# Patient Record
Sex: MALE | Race: WHITE | Employment: OTHER | URBAN - METROPOLITAN AREA
[De-identification: names, ages, dates, MRNs, and addresses within clinical notes are randomized per-mention and may not be internally consistent; named-entity substitution may affect disease eponyms.]

---

## 2017-07-23 PROBLEM — N18.30 CKD (CHRONIC KIDNEY DISEASE) STAGE 3, GFR 30-59 ML/MIN (HCC): Status: ACTIVE | Noted: 2017-07-23

## 2017-07-23 PROBLEM — E78.5 DYSLIPIDEMIA: Status: ACTIVE | Noted: 2017-07-23

## 2017-07-23 PROBLEM — R73.03 PREDIABETES: Status: ACTIVE | Noted: 2017-07-23

## 2017-07-23 PROBLEM — N20.0 RECURRENT KIDNEY STONES: Status: ACTIVE | Noted: 2017-07-23

## 2017-07-23 PROBLEM — I10 HYPERTENSION: Status: ACTIVE | Noted: 2017-07-23

## 2017-07-23 PROBLEM — M10.9 GOUT: Status: ACTIVE | Noted: 2017-07-23

## 2017-07-25 RX ORDER — COLCHICINE 0.6 MG/1
0.6 TABLET ORAL DAILY
COMMUNITY
End: 2018-05-18

## 2017-07-25 RX ORDER — LISINOPRIL 5 MG/1
5 TABLET ORAL
COMMUNITY
End: 2018-05-20

## 2017-07-25 RX ORDER — FENOFIBRATE 160 MG/1
160 TABLET ORAL DAILY
COMMUNITY
End: 2017-12-10 | Stop reason: SDUPTHER

## 2017-07-25 RX ORDER — ALLOPURINOL 100 MG/1
TABLET ORAL DAILY
COMMUNITY
End: 2018-05-18 | Stop reason: DRUGHIGH

## 2017-07-26 ENCOUNTER — OFFICE VISIT (OUTPATIENT)
Dept: INTERNAL MEDICINE CLINIC | Age: 67
End: 2017-07-26

## 2017-07-26 VITALS
HEIGHT: 66 IN | DIASTOLIC BLOOD PRESSURE: 76 MMHG | HEART RATE: 64 BPM | BODY MASS INDEX: 30.25 KG/M2 | WEIGHT: 188.2 LBS | SYSTOLIC BLOOD PRESSURE: 120 MMHG

## 2017-07-26 DIAGNOSIS — N18.30 CKD (CHRONIC KIDNEY DISEASE) STAGE 3, GFR 30-59 ML/MIN (HCC): ICD-10-CM

## 2017-07-26 DIAGNOSIS — R73.03 PRE-DIABETES: ICD-10-CM

## 2017-07-26 DIAGNOSIS — I10 ESSENTIAL HYPERTENSION: Primary | ICD-10-CM

## 2017-07-26 DIAGNOSIS — E78.5 DYSLIPIDEMIA: ICD-10-CM

## 2017-07-26 NOTE — PROGRESS NOTES
This note will not be viewable in [address] Hilary Ibarra. is a 69 y.o. male and presents with Hypertension (6 mo fu) and Cholesterol Problem (6 mo fu)  . Subjective: The patient presents to the office today in 6 month follow-up of a number of medical problems. Patient has hypertension for which she is on lisinopril and atenolol he tolerates this without fatigue or palpitations, muscle cramping cough, swelling cough rash. He has had no focal neurological deficits. He denies dizzy spells. He remains physically active. Dyslipidemia is currently managed on fenofibrate which he tolerates without upset stomach. He has no history of ASCVD and denies exertional chest pain or claudication. The patient has chronic kidney disease stage III is followed by Dr. Herson Esqueda and this has been checked within the last 3 weeks by Dr. Herson Esqueda and it is stable without changes in his creatinine clearance. The patient has episodic gout and has been started on allopurinol. Uric acid level was not to goal recently and his allopurinol was adjusted. He does have Colcrys available for episodic flares but has not had one in over one year. The patient has recurrent kidney stones. He has not had an attack in over one year. It is hoped that by lowering his uric acid level that this will be an infrequent event. Past Medical History:   Diagnosis Date    Cancer [facility])     prostate    Finding of rib structure     right lower rib cartilage weak    Hypertension     Other ill-defined conditions     gout    Unspecified adverse effect of anesthesia     lays on back,chokes on drainage    Unspecified sleep apnea     septum surgery to fix, still an issue     Past Surgical History:   Procedure Laterality Date    HX KNEE ARTHROSCOPY      HX OTHER SURGICAL      kidney stones     No Known Allergies  Current Outpatient Prescriptions   Medication Sig Dispense Refill    allopurinol (ZYLOPRIM) 100 mg tablet Take  by mouth daily.  fenofibrate (LOFIBRA) 160 mg tablet Take 160 mg by mouth daily.  colchicine (COLCRYS) 0.6 mg tablet Take 0.6 mg by mouth daily.  lisinopril (PRINIVIL, ZESTRIL) 5 mg tablet Take  by mouth daily.  aspirin (ASPIRIN) 325 mg tablet Take 325 mg by mouth daily.  atenolol (TENORMIN) 50 mg tablet Take 25 mg by mouth daily. Social History     Social History    Marital status:      Spouse name: N/A    Number of children: N/A    Years of education: N/A     Social History Main Topics    Smoking status: Never Smoker    Smokeless tobacco: Never Used    Alcohol use No    Drug use: No    Sexual activity: Not Asked     Other Topics Concern    None     Social History Narrative     Family History   Problem Relation Age of Onset    Diabetes Mother     Hypertension Father        Health Maintenance   Topic Date Due    Hepatitis C Screening  1950    DTaP/Tdap/Td series (1 - Tdap) 01/08/1969    FOBT Q 1 YEAR AGE 50-75  01/08/1998    ZOSTER VACCINE AGE 60>  11/08/2007    GLAUCOMA SCREENING Q2Y  01/08/2013    Pneumococcal 65+ High/Highest Risk (1 of 2 - PCV13) 01/08/2013    MEDICARE YEARLY EXAM  01/08/2013    INFLUENZA AGE 9 TO ADULT  08/01/2017        Review of Systems  Constitutional: negative for fevers, chills, anorexia and weight loss  Eyes:   negative for visual disturbance and irritation  ENT:   negative for tinnitus,sore throat,nasal congestion,ear pains. hoarseness  Respiratory:  negative for cough, hemoptysis, dyspnea,wheezing  CV:   negative for chest pain, palpitations, lower extremity edema  GI:   negative for nausea, vomiting, diarrhea, abdominal pain,melena  Endo:               negative for polyuria,polydipsia,polyphagia,heat intolerance  Genitourinary: negative for frequency, dysuria and hematuria  Integumentary: negative for rash and pruritus  Hematologic:  negative for easy bruising and gum/nose bleeding  Musculoskel: negative for myalgias, arthralgias, back pain, muscle weakness, joint pain  Neurological:  negative for headaches, dizziness, vertigo, memory problems and gait   Behavl/Psych: negative for feelings of anxiety, depression, mood changes  ROS otherwise negative      Objective:  Visit Vitals    /76    Pulse 64    Ht 5' 6\" (1.676 m)    Wt 188 lb 3.2 oz (85.4 kg)    BMI 30.38 kg/m2     Body mass index is 30.38 kg/(m^2). Physical Exam:   General appearance - alert, well appearing, and in no distress  Mental status - alert, oriented to person, place, and time  EYE-YOLIS, EOMI, fundi normal, corneas normal, no foreign bodies  ENT-ENT exam normal, no neck nodes or sinus tenderness  Nose - normal and patent, no erythema, discharge or polyps  Mouth - mucous membranes moist, pharynx normal without lesions  Neck - supple, no significant adenopathy   Chest - clear to auscultation, no wheezes, rales or rhonchi, symmetric air entry   Heart - normal rate, regular rhythm, normal S1, S2, no murmurs, rubs, clicks or gallops   Abdomen - soft, nontender, nondistended, no masses or organomegaly  Lymph- no adenopathy palpable  Ext-peripheral pulses normal, no pedal edema, no clubbing or cyanosis  Skin-Warm and dry. no hyperpigmentation, vitiligo, or suspicious lesions  Neuro -alert, oriented, normal speech, no focal findings or movement disorder noted      Assessment/Plan:  Diagnoses and all orders for this visit:    1. Essential hypertension    2. Dyslipidemia    3. Pre-diabetes    4. CKD (chronic kidney disease) stage 3, GFR 30-59 ml/min        Other instructions:   Medications are reviewed and reconciled and no changes are made. No added salt prudent diet and exercise encouraged    Await labs from Dr. Rhoda Cutler office. Follow-up Disposition:  Return in about 6 months (around 1/26/2018). I have reviewed with the patient details of the assessment and plan and all questions were answered. Relevent patient education was performed. The most recent lab findings were reviewed with the patient. An After Visit Summary was printed and given to the patient.     Verner Auerbach, MD

## 2017-07-26 NOTE — PROGRESS NOTES
Heather Velasquez. is a 69 y.o. male presenting for Hypertension (6 mo fu) and Cholesterol Problem (6 mo fu)  . 1. Have you been to the ER, urgent care clinic since your last visit? Hospitalized since your last visit? No    2. Have you seen or consulted any other health care providers outside of the [address] [place] [address] since your last visit? Include any pap smears or colon screening. Yes Dr Tristian Hanna- kidneys    Fall Risk Assessment, last 12 mths 7/26/2017   Able to walk? Yes   Fall in past 12 months? No         No flowsheet data found. PHQ over the last two weeks 7/26/2017   Little interest or pleasure in doing things Not at all   Feeling down, depressed or hopeless Not at all   Total Score PHQ 2 0       There are no discontinued medications.

## 2017-07-26 NOTE — PATIENT INSTRUCTIONS

## 2017-12-11 RX ORDER — FENOFIBRATE 160 MG/1
TABLET ORAL
Qty: 90 TAB | Refills: 3 | Status: SHIPPED | OUTPATIENT
Start: 2017-12-11 | End: 2018-05-18 | Stop reason: DRUGHIGH

## 2017-12-11 NOTE — TELEPHONE ENCOUNTER
Requested Prescriptions     Pending Prescriptions Disp Refills    fenofibrate (LOFIBRA) 160 mg tablet [Pharmacy Med Name: FENOFIBRATE 160MG   TAB] 90 Tab 3     Sig: TAKE ONE TABLET BY MOUTH ONCE DAILY       Last Refill: 8-31-17  Last visit:7/26/2017

## 2018-01-31 ENCOUNTER — OFFICE VISIT (OUTPATIENT)
Dept: INTERNAL MEDICINE CLINIC | Age: 68
End: 2018-01-31

## 2018-01-31 VITALS
BODY MASS INDEX: 30.18 KG/M2 | DIASTOLIC BLOOD PRESSURE: 76 MMHG | HEART RATE: 56 BPM | SYSTOLIC BLOOD PRESSURE: 124 MMHG | OXYGEN SATURATION: 95 % | WEIGHT: 187.8 LBS | HEIGHT: 66 IN

## 2018-01-31 DIAGNOSIS — R73.03 PRE-DIABETES: ICD-10-CM

## 2018-01-31 DIAGNOSIS — M10.9 GOUT, UNSPECIFIED CAUSE, UNSPECIFIED CHRONICITY, UNSPECIFIED SITE: ICD-10-CM

## 2018-01-31 DIAGNOSIS — I10 ESSENTIAL HYPERTENSION: Primary | ICD-10-CM

## 2018-01-31 DIAGNOSIS — E78.5 DYSLIPIDEMIA: ICD-10-CM

## 2018-01-31 DIAGNOSIS — N18.30 CKD (CHRONIC KIDNEY DISEASE) STAGE 3, GFR 30-59 ML/MIN (HCC): ICD-10-CM

## 2018-01-31 LAB
ALBUMIN SERPL-MCNC: 4.4 G/DL (ref 3.9–5.4)
ALKALINE PHOS POC: 37 U/L (ref 38–126)
ALT SERPL-CCNC: 29 U/L (ref 9–52)
AST SERPL-CCNC: 27 U/L (ref 14–36)
BACTERIA UA POCT, BACTPOCT: NORMAL
BILIRUB UR QL STRIP: NEGATIVE
BUN BLD-MCNC: 35 MG/DL (ref 9–20)
CALCIUM BLD-MCNC: 9.8 MG/DL (ref 8.4–10.2)
CASTS UA POCT: 0
CHLORIDE BLD-SCNC: 103 MMOL/L (ref 98–107)
CHOLEST SERPL-MCNC: 157 MG/DL (ref 0–200)
CLUE CELLS, CLUEPOCT: NEGATIVE
CO2 POC: 27 MMOL/L (ref 22–32)
CREAT BLD-MCNC: 2.3 MG/DL (ref 0.8–1.5)
CRYSTALS UA POCT, CRYSPOCT: NEGATIVE
EGFR (POC): 27.7
EPITHELIAL CELLS POCT: NORMAL
GLUCOSE POC: 107 MG/DL (ref 75–110)
GLUCOSE UR-MCNC: NEGATIVE MG/DL
GRAN# POC: 3.1 K/UL (ref 2–7.8)
GRAN% POC: 58.7 % (ref 37–92)
HBA1C MFR BLD HPLC: 6.4 % (ref 4.5–5.7)
HCT VFR BLD CALC: 42.5 % (ref 37–51)
HDLC SERPL-MCNC: 29 MG/DL (ref 35–130)
HGB BLD-MCNC: 14.4 G/DL (ref 12–18)
KETONES P FAST UR STRIP-MCNC: NEGATIVE MG/DL
LDL CHOLESTEROL POC: 70.8 MG/DL (ref 0–130)
LY# POC: 1.7 K/UL (ref 0.6–4.1)
LY% POC: 34.8 % (ref 10–58.5)
MCH RBC QN: 34 PG (ref 26–32)
MCHC RBC-ENTMCNC: 33.9 G/DL (ref 30–36)
MCV RBC: 100 FL (ref 80–97)
MID #, POC: 0.3 K/UL (ref 0–1.8)
MID% POC: 6.5 % (ref 0.1–24)
MUCUS UA POCT, MUCPOCT: NORMAL
PH UR STRIP: 6 [PH] (ref 5–7)
PLATELET # BLD: 180 K/UL (ref 140–440)
POTASSIUM SERPL-SCNC: 4.6 MMOL/L (ref 3.6–5)
PROT SERPL-MCNC: 7.6 G/DL (ref 6.3–8.2)
PROT UR QL STRIP: NEGATIVE
RBC # BLD: 4.24 M/UL (ref 4.2–6.3)
RBC UA POCT, RBCPOCT: 0
SODIUM SERPL-SCNC: 142 MMOL/L (ref 137–145)
SP GR UR STRIP: 1.01 (ref 1.01–1.02)
TCHOL/HDL RATIO (POC): 5.4 (ref 0–4)
TOTAL BILIRUBIN POC: 1.1 MG/DL (ref 0.2–1.3)
TRICH UA POCT, TRICHPOC: NEGATIVE
TRIGL SERPL-MCNC: 286 MG/DL (ref 0–200)
TSH BLD-ACNC: 2.12 UIU/ML (ref 0.4–4.2)
UA UROBILINOGEN AMB POC: NORMAL (ref 0.2–1)
URIC ACID, POC: 5.3 MG/DL (ref 3.5–8.5)
URINALYSIS CLARITY POC: CLEAR
URINALYSIS COLOR POC: NORMAL
URINE BLOOD POC: NEGATIVE
URINE CULT COMMENT, POCT: NORMAL
URINE LEUKOCYTES POC: NEGATIVE
URINE NITRITES POC: NEGATIVE
VLDLC SERPL CALC-MCNC: 57.2 MG/DL
WBC # BLD: 5.1 K/UL (ref 4.1–10.9)
WBC UA POCT, WBCPOCT: NORMAL
YEAST UA POCT, YEASTPOC: NEGATIVE

## 2018-01-31 NOTE — MR AVS SNAPSHOT
[address] [address] [phone] Patient: Willian Bright. MRN: [id number] Visit Information Date & Time Provider Department Dept. Phone Encounter #  
 2018  8:00 AM [facility] Vivar [address] [phone] [id number] Follow-up Instructions Return in about 6 months (around 2018). Follow-up and Disposition History Upcoming Health Maintenance Date Due Hepatitis C Screening 1950 DTaP/Tdap/Td series (1 - Tdap) 1969 FOBT Q 1 YEAR AGE 50-75 1998 ZOSTER VACCINE AGE 60> 2007 GLAUCOMA SCREENING Q2Y 2013 MEDICARE YEARLY EXAM 2013 Pneumococcal 65+ High/Highest Risk (2 of 2 - PPSV23) 3/28/2018 Allergies as of 2018  Review Complete On: 2018 By: Yovany Gregorio MD  
 No Known Allergies Current Immunizations  Never Reviewed No immunizations on file. Not reviewed this visit You Were Diagnosed With   
  
 Codes Comments Essential hypertension    -  Primary ICD-10-CM: I10 
ICD-9-CM: 401.9 Dyslipidemia     ICD-10-CM: E78.5 ICD-9-CM: 272.4 Gout, unspecified cause, unspecified chronicity, unspecified site     ICD-10-CM: M10.9 ICD-9-CM: 274.9 Pre-diabetes     ICD-10-CM: R73.03 
ICD-9-CM: 790.29 CKD (chronic kidney disease) stage 3, GFR 30-59 ml/min     ICD-10-CM: N18.3 ICD-9-CM: 383. 3 Vitals BP Pulse Height(growth percentile) Weight(growth percentile) SpO2 BMI  
 124/76 (!) 56 5' 6\" (1.676 m) 187 lb 12.8 oz (85.2 kg) 95% 30.31 kg/m2 Smoking Status Never Smoker Vitals History BMI and BSA Data Body Mass Index Body Surface Area  
 30.31 kg/m 2 1.99 m 2 Preferred Pharmacy Pharmacy Name Phone [facility] [address] [place] [address] [phone] Your Updated Medication List  
  
   
This list is accurate as of: 1/31/18  8:38 AM.  Always use your most recent med list.  
  
  
  
  
 allopurinol 100 mg tablet Commonly known as:  Thedore Ward Take  by mouth daily. aspirin 325 mg tablet Commonly known as:  ASPIRIN Take 325 mg by mouth daily. atenolol 50 mg tablet Commonly known as:  TENORMIN Take 25 mg by mouth daily. COLCRYS 0.6 mg tablet Generic drug:  colchicine Take 0.6 mg by mouth daily. fenofibrate 160 mg tablet Commonly known as:  LOFIBRA TAKE ONE TABLET BY MOUTH ONCE DAILY  
  
 lisinopril 5 mg tablet Commonly known as:  Birdena Jimbo Take  by mouth daily. We Performed the Following AMB POC COMPLETE CBC,AUTOMATED ENTER R3721278 CPT(R)] AMB POC COMPREHENSIVE METABOLIC PANEL [02244 CPT(R)] AMB POC HEMOGLOBIN A1C [86396 CPT(R)] AMB POC LIPID PROFILE [93342 CPT(R)] AMB POC TSH [57535 CPT(R)] AMB POC URIC ACID [61752 CPT(R)] AMB POC URINALYSIS DIP STICK AUTO W/ MICRO  [01477 CPT(R)] COLLECTION VENOUS BLOOD,VENIPUNCTURE [id number] CPT(R)] Follow-up Instructions Return in about 6 months (around 7/31/2018). Patient Instructions Learning About Cutting Calories How do calories affect your weight? Food gives your body energy. Energy from the food you eat is measured in calories. This energy keeps your heart beating, your brain active, and your muscles working. Your body needs a certain number of calories each day. After your body uses the calories it needs, it stores extra calories as fat. To lose weight safely, you have to eat fewer calories while eating in a healthy way. How many calories do you need each day? The more active you are, the more calories you need. When you are less active, you need fewer calories. How many calories you need each day also depends on several things, including your age and whether you are male or female. Here are some general guidelines for adults: 
· Less active women and older adults need 1,600 to 2,000 calories each day. · Active women and less active men need 2,000 to 2,400 calories each day. · Active men need 2,400 to 3,000 calories each day. How can you cut calories and eat healthy meals? Whole grains, vegetables and fruits, and dried beans are good lower-calorie foods. They give you lots of nutrients and fiber. And they fill you up. Sweets, energy drinks, and soda pop are high in calories. They give you few nutrients and no fiber. Try to limit soda pop, fruit juice, and energy drinks. Drink water instead. Some fats can be part of a healthy diet. But cutting back on fats from highly processed foods like fast foods and many snack foods is a good way to lower the calories in your diet. Also, use smaller amounts of fats like butter, margarine, salad dressing, and mayonnaise. Add fresh garlic, lemon, or herbs to your meals to add flavor without adding fat. Meats and dairy products can be a big source of hidden fats. Try to choose lean or low-fat versions of these products. Fat-free cookies, candies, chips, and frozen treats can still be high in sugar and calories. Some fat-free foods have more calories than regular ones. Eat fat-free treats in moderation, as you would other foods. If your favorite foods are high in fat, salt, sugar, or calories, limit how often you eat them. Eat smaller servings, or look for healthy substitutes. Fill up on fruits, vegetables, and whole grains. Eating at home · Use meat as a side dish instead of as the main part of your meal. 
· Try main dishes that use whole wheat pasta, brown rice, dried beans, or vegetables. · Find ways to cook with little or no fat, such as broiling, steaming, or grilling. · Use cooking spray instead of oil. If you use oil, use a monounsaturated oil, such as canola or olive oil. · Trim fat from meats before you cook them. · Drain off fat after you brown the meat or while you roast it. · Chill soups and stews after you cook them. Then skim the fat off the top after it hardens. Eating out · Order foods that are broiled or poached rather than fried or breaded. · Cut back on the amount of butter or margarine that you use on bread. · Order sauces, gravies, and salad dressings on the side, and use only a little. · When you order pasta, choose tomato sauce rather than cream sauce. · Ask for salsa with your baked potato instead of sour cream, butter, cheese, or hill. · Order meals in a small size instead of upgrading to a large. · Share an entree, or take part of your food home to eat as another meal. 
· Share appetizers and desserts. Where can you learn more? Go to http://sharon-lloyd.info/. Enter 99 [id number] in the search box to learn more about \"Learning About Cutting Calories. \" Current as of: May 12, 2017 Content Version: 11.4 © 7011-1227 [employer]. Care instructions adapted under license by [employer] [place] (which disclaims liability or warranty for this information). If you have questions about a medical condition or this instruction, always ask your healthcare professional. [place] [address] any warranty or liability for your use of this information. Patient Instructions History Introducing [facility]! Mason Olvera introduces [employer] patient portal. Now you can access parts of your medical record, email your doctor's office, and request medication refills online. 1. In your internet browser, go to [email or web address] 2. Click on the First Time User? Click Here link in the Sign In box. You will see the New Member Sign Up page. 3. Enter your [employer] Access Code exactly as it appears below. You will not need to use this code after youve completed the sign-up process.  If you do not sign up before the expiration date, you must request a new code. · [employer] Access Code: [email or web address] Expires: 5/1/2018  8:05 AM 
 
4. Enter the last four digits of your Social Security Number (xxxx) and Date of Birth (mm/dd/yyyy) as indicated and click Submit. You will be taken to the next sign-up page. 5. Create a [employer] ID. This will be your [employer] login ID and cannot be changed, so think of one that is secure and easy to remember. 6. Create a [employer] password. You can change your password at any time. 7. Enter your Password Reset Question and Answer. This can be used at a later time if you forget your password. 8. Enter your e-mail address. You will receive e-mail notification when new information is available in [address] 9. Click Sign Up. You can now view and download portions of your medical record. 10. Click the Download Summary menu link to download a portable copy of your medical information. If you have questions, please visit the Frequently Asked Questions section of the [employer] website. Remember, [employer] is NOT to be used for urgent needs. For medical emergencies, dial 911. Now available from your iPhone and Android! Please provide this summary of care documentation to your next provider. Your primary care clinician is listed as BOBY Pepe 21. If you have any questions after today's visit, please call [phone].

## 2018-01-31 NOTE — PATIENT INSTRUCTIONS

## 2018-01-31 NOTE — PROGRESS NOTES
This note will not be viewable in [address] Lala Cho is a 77 y.o. male and presents with Hypertension (6 mo fu)  . Subjective:  Patient returns to the office today in follow-up of multiple medical problems. The patient has prediabetes. Is trying to follow a prudent diet and maintain physical activity to prevent progression to diabetes mellitus. The patient denies polyuria polydipsia or blurred vision. His blood pressure is currently managed on lisinopril and atenolol. He denies any cough, swelling, dizziness, fatigue or palpitations. He has had no headaches, numbness, tingling or focal neurological problems. His dyslipidemia is currently managed on fenofibrate therapy. He denies GI upset. He has no history of ASCVD and denies exertional chest pains or claudication. He has chronic kidney disease stage III and is followed by Dr. Stacey Thomson. His kidney function has remained stable and he does have a history of gout for which he is on allopurinol. He is taking approximately 200 mg of allopurinol a day and is had no recurrent gouty attacks in the last 6 months and has not required any colchicine. Past Medical History:   Diagnosis Date    Finding of rib structure     right lower rib cartilage weak    Hypertension     Other ill-defined conditions(200.62)     gout    Unspecified adverse effect of anesthesia     lays on back,chokes on drainage    Unspecified sleep apnea     septum surgery to fix, still an issue     Past Surgical History:   Procedure Laterality Date    HX KNEE ARTHROSCOPY      HX OTHER SURGICAL      kidney stones     No Known Allergies  Current Outpatient Prescriptions   Medication Sig Dispense Refill    fenofibrate (LOFIBRA) 160 mg tablet TAKE ONE TABLET BY MOUTH ONCE DAILY 90 Tab 3    allopurinol (ZYLOPRIM) 100 mg tablet Take  by mouth daily.  colchicine (COLCRYS) 0.6 mg tablet Take 0.6 mg by mouth daily.       lisinopril (PRINIVIL, ZESTRIL) 5 mg tablet Take by mouth daily.  aspirin (ASPIRIN) 325 mg tablet Take 325 mg by mouth daily.  atenolol (TENORMIN) 50 mg tablet Take 25 mg by mouth daily.        Social History     Social History    Marital status:      Spouse name: N/A    Number of children: N/A    Years of education: N/A     Social History Main Topics    Smoking status: Never Smoker    Smokeless tobacco: Never Used    Alcohol use No    Drug use: No    Sexual activity: Not Asked     Other Topics Concern    None     Social History Narrative     Family History   Problem Relation Age of Onset    Diabetes Mother     Hypertension Father        Health Maintenance   Topic Date Due    Hepatitis C Screening  1950    DTaP/Tdap/Td series (1 - Tdap) 01/08/1969    FOBT Q 1 YEAR AGE 50-75  01/08/1998    ZOSTER VACCINE AGE 60>  11/08/2007    GLAUCOMA SCREENING Q2Y  01/08/2013    MEDICARE YEARLY EXAM  01/08/2013    Pneumococcal 65+ High/Highest Risk (2 of 2 - PPSV23) 03/28/2018    Influenza Age 5 to Adult  Addressed        Review of Systems  Constitutional: negative for fevers, chills, anorexia and weight loss  Eyes:   negative for visual disturbance and irritation  ENT:   negative for tinnitus,sore throat,nasal congestion,ear pain,hoarseness  Respiratory:  negative for cough, hemoptysis, dyspnea,wheezing  CV:   negative for chest pain, palpitations, lower extremity edema  GI:   negative for nausea, vomiting, diarrhea, abdominal pain,melena  Endo:               negative for polyuria,polydipsia,polyphagia,heat intolerance  Genitourinary: negative for frequency, dysuria and hematuria  Integumentary: negative for rash and pruritus  Hematologic:  negative for easy bruising and gum/nose bleeding  Musculoskel: negative for myalgias, arthralgias, back pain, muscle weakness, joint pain  Neurological:  negative for headaches, dizziness, vertigo, memory problems and gait   Behavl/Psych: negative for feelings of anxiety, depression, mood changes  ROS otherwise negative      Objective:  Visit Vitals    /76    Pulse (!) 56    Ht 5' 6\" (1.676 m)    Wt 187 lb 12.8 oz (85.2 kg)    SpO2 95%    BMI 30.31 kg/m2     Body mass index is 30.31 kg/(m^2). Physical Exam:   General appearance - alert, well appearing, and in no distress  Mental status - alert, oriented to person, place, and time  EYE-YOLIS, EOMI,conjunctiva normal bilaterally, lids normal  ENT-ENT exam normal, no neck nodes or sinus tenderness  Nose - normal and patent, no erythema,  Or discharge   Mouth - mucous membranes moist, pharynx normal without lesions  Neck - supple, no significant adenopathy or bruit  Chest - clear to auscultation, no wheezes, rales or rhonchi. Heart - normal rate, regular rhythm, normal S1, S2, no murmurs, rubs, clicks or gallops   Abdomen - soft, nontender, nondistended, no masses or organomegaly  Lymph- no adenopathy palpable  Ext-peripheral pulses normal, no pedal edema, no clubbing or cyanosis  Skin-Warm and dry. no hyperpigmentation, vitiligo, or suspicious lesions  Neuro -alert, oriented, normal speech, no focal findings or movement disorder noted      Assessment/Plan:  Diagnoses and all orders for this visit:    Essential hypertension  -     AMB POC COMPLETE CBC,AUTOMATED ENTER  -     AMB POC COMPREHENSIVE METABOLIC PANEL  -     COLLECTION VENOUS BLOOD,VENIPUNCTURE  -     AMB POC URINALYSIS DIP STICK AUTO W/ MICRO     Dyslipidemia  -     AMB POC LIPID PROFILE  -     AMB POC TSH    Gout, unspecified cause, unspecified chronicity, unspecified site  -     AMB POC URIC ACID    Pre-diabetes  -     AMB POC HEMOGLOBIN A1C    CKD (chronic kidney disease) stage 3, GFR 30-59 ml/min        Other instructions: The patient's medications are reviewed and reconciled. No change in his current medical regimen is made. Body mass index is 30.31 and dietary counseling along with printed patient education is given.     Await results of multiple labs    Continue nephrology follow-up    Follow-up 6 months    Follow-up Disposition:  Return in about 6 months (around 7/31/2018). I have reviewed with the patient details of the assessment and plan and all questions were answered. Relevent patient education was performed. The most recent lab findings were reviewed with the patient. An After Visit Summary was printed and given to the patient.     Jeremiah Arambula MD

## 2018-01-31 NOTE — PROGRESS NOTES
Ross Omer. is a 77 y.o. male presenting for Hypertension (6 mo fu)  . 1. Have you been to the ER, urgent care clinic since your last visit? Hospitalized since your last visit? No    2. Have you seen or consulted any other health care providers outside of the [address] [place] [address] since your last visit? Include any pap smears or colon screening. No    Fall Risk Assessment, last 12 mths 7/26/2017   Able to walk? Yes   Fall in past 12 months? No         Abuse Screening Questionnaire 7/26/2017   Do you ever feel afraid of your partner? N   Are you in a relationship with someone who physically or mentally threatens you? N   Is it safe for you to go home? Y       PHQ over the last two weeks 7/26/2017   Little interest or pleasure in doing things Not at all   Feeling down, depressed or hopeless Not at all   Total Score PHQ 2 0       There are no discontinued medications.

## 2018-02-05 NOTE — PROGRESS NOTES
Labs remained stable. Hemoglobin A1c is just below the diabetic level at 6.4. No change in current management.   Fax a copy of labs to his kidney specialist.

## 2018-05-18 ENCOUNTER — APPOINTMENT (OUTPATIENT)
Dept: GENERAL RADIOLOGY | Age: 68
End: 2018-05-18
Payer: COMMERCIAL

## 2018-05-18 ENCOUNTER — APPOINTMENT (OUTPATIENT)
Dept: CT IMAGING | Age: 68
End: 2018-05-18
Attending: EMERGENCY MEDICINE
Payer: COMMERCIAL

## 2018-05-18 ENCOUNTER — APPOINTMENT (OUTPATIENT)
Dept: NUCLEAR MEDICINE | Age: 68
End: 2018-05-18
Attending: EMERGENCY MEDICINE
Payer: COMMERCIAL

## 2018-05-18 ENCOUNTER — HOSPITAL ENCOUNTER (OUTPATIENT)
Age: 68
Setting detail: OBSERVATION
Discharge: HOME | End: 2018-05-20
Attending: EMERGENCY MEDICINE | Admitting: INTERNAL MEDICINE
Payer: COMMERCIAL

## 2018-05-18 ENCOUNTER — APPOINTMENT (OUTPATIENT)
Dept: ULTRASOUND IMAGING | Age: 68
End: 2018-05-18
Attending: INTERNAL MEDICINE
Payer: COMMERCIAL

## 2018-05-18 DIAGNOSIS — N17.9 STAGE 3 ACUTE KIDNEY INJURY (HCC): ICD-10-CM

## 2018-05-18 DIAGNOSIS — R07.81 PLEURITIC CHEST PAIN: ICD-10-CM

## 2018-05-18 DIAGNOSIS — E78.5 DYSLIPIDEMIA: ICD-10-CM

## 2018-05-18 DIAGNOSIS — N17.9 AKI (ACUTE KIDNEY INJURY) (HCC): Primary | ICD-10-CM

## 2018-05-18 DIAGNOSIS — I10 ESSENTIAL HYPERTENSION: ICD-10-CM

## 2018-05-18 DIAGNOSIS — R07.2 PRECORDIAL PAIN: ICD-10-CM

## 2018-05-18 DIAGNOSIS — R07.9 ACUTE CHEST PAIN: ICD-10-CM

## 2018-05-18 DIAGNOSIS — N18.30 CKD (CHRONIC KIDNEY DISEASE) STAGE 3, GFR 30-59 ML/MIN (HCC): ICD-10-CM

## 2018-05-18 LAB
ALBUMIN SERPL-MCNC: 3.8 G/DL (ref 3.5–5)
ALBUMIN/GLOB SERPL: 1 {RATIO} (ref 1.1–2.2)
ALP SERPL-CCNC: 45 U/L (ref 45–117)
ALT SERPL-CCNC: 27 U/L (ref 12–78)
ANION GAP SERPL CALC-SCNC: 8 MMOL/L (ref 5–15)
APPEARANCE UR: CLEAR
AST SERPL-CCNC: 19 U/L (ref 15–37)
ATRIAL RATE: 81 BPM
BACTERIA URNS QL MICRO: NEGATIVE /HPF
BASOPHILS # BLD: 0 K/UL (ref 0–0.1)
BASOPHILS NFR BLD: 0 % (ref 0–1)
BILIRUB SERPL-MCNC: 0.6 MG/DL (ref 0.2–1)
BILIRUB UR QL: NEGATIVE
BNP SERPL-MCNC: 543 PG/ML (ref 0–125)
BUN SERPL-MCNC: 32 MG/DL (ref 6–20)
BUN/CREAT SERPL: 13 (ref 12–20)
CALCIUM SERPL-MCNC: 8.9 MG/DL (ref 8.5–10.1)
CALCULATED P AXIS, ECG09: 53 DEGREES
CALCULATED R AXIS, ECG10: -3 DEGREES
CALCULATED T AXIS, ECG11: 27 DEGREES
CHLORIDE SERPL-SCNC: 108 MMOL/L (ref 97–108)
CK MB CFR SERPL CALC: 0.7 % (ref 0–2.5)
CK MB SERPL-MCNC: 1.3 NG/ML (ref 5–25)
CK SERPL-CCNC: 176 U/L (ref 39–308)
CK SERPL-CCNC: 241 U/L (ref 39–308)
CO2 SERPL-SCNC: 23 MMOL/L (ref 21–32)
COLOR UR: NORMAL
CREAT SERPL-MCNC: 2.54 MG/DL (ref 0.7–1.3)
D DIMER PPP FEU-MCNC: 0.46 MG/L FEU (ref 0–0.65)
DIAGNOSIS, 93000: NORMAL
DIFFERENTIAL METHOD BLD: ABNORMAL
EOSINOPHIL # BLD: 0.1 K/UL (ref 0–0.4)
EOSINOPHIL NFR BLD: 1 % (ref 0–7)
EPITH CASTS URNS QL MICRO: NORMAL /LPF
ERYTHROCYTE [DISTWIDTH] IN BLOOD BY AUTOMATED COUNT: 14 % (ref 11.5–14.5)
GLOBULIN SER CALC-MCNC: 3.7 G/DL (ref 2–4)
GLUCOSE SERPL-MCNC: 151 MG/DL (ref 65–100)
GLUCOSE UR STRIP.AUTO-MCNC: NEGATIVE MG/DL
HCT VFR BLD AUTO: 41.2 % (ref 36.6–50.3)
HGB BLD-MCNC: 13.6 G/DL (ref 12.1–17)
HGB UR QL STRIP: NEGATIVE
IMM GRANULOCYTES # BLD: 0 K/UL (ref 0–0.04)
IMM GRANULOCYTES NFR BLD AUTO: 0 % (ref 0–0.5)
KETONES UR QL STRIP.AUTO: NEGATIVE MG/DL
LACTATE SERPL-SCNC: 1 MMOL/L (ref 0.4–2)
LEUKOCYTE ESTERASE UR QL STRIP.AUTO: NEGATIVE
LYMPHOCYTES # BLD: 1 K/UL (ref 0.8–3.5)
LYMPHOCYTES NFR BLD: 10 % (ref 12–49)
MCH RBC QN AUTO: 33.5 PG (ref 26–34)
MCHC RBC AUTO-ENTMCNC: 33 G/DL (ref 30–36.5)
MCV RBC AUTO: 101.5 FL (ref 80–99)
MONOCYTES # BLD: 0.8 K/UL (ref 0–1)
MONOCYTES NFR BLD: 7 % (ref 5–13)
NEUTS SEG # BLD: 8.3 K/UL (ref 1.8–8)
NEUTS SEG NFR BLD: 81 % (ref 32–75)
NITRITE UR QL STRIP.AUTO: NEGATIVE
NRBC # BLD: 0 K/UL (ref 0–0.01)
NRBC BLD-RTO: 0 PER 100 WBC
P-R INTERVAL, ECG05: 156 MS
PH UR STRIP: 6.5 [PH] (ref 5–8)
PLATELET # BLD AUTO: 181 K/UL (ref 150–400)
PMV BLD AUTO: 11.1 FL (ref 8.9–12.9)
POTASSIUM SERPL-SCNC: 4.5 MMOL/L (ref 3.5–5.1)
PROT SERPL-MCNC: 7.5 G/DL (ref 6.4–8.2)
PROT UR STRIP-MCNC: NEGATIVE MG/DL
Q-T INTERVAL, ECG07: 362 MS
QRS DURATION, ECG06: 90 MS
QTC CALCULATION (BEZET), ECG08: 420 MS
RBC # BLD AUTO: 4.06 M/UL (ref 4.1–5.7)
RBC #/AREA URNS HPF: NORMAL /HPF (ref 0–5)
SODIUM SERPL-SCNC: 139 MMOL/L (ref 136–145)
SP GR UR REFRACTOMETRY: 1.01 (ref 1–1.03)
TROPONIN I SERPL-MCNC: <0.04 NG/ML
TROPONIN I SERPL-MCNC: <0.04 NG/ML
UA: UC IF INDICATED,UAUC: NORMAL
UROBILINOGEN UR QL STRIP.AUTO: 1 EU/DL (ref 0.2–1)
VENTRICULAR RATE, ECG03: 81 BPM
WBC # BLD AUTO: 10.2 K/UL (ref 4.1–11.1)
WBC URNS QL MICRO: NORMAL /HPF (ref 0–4)

## 2018-05-18 PROCEDURE — 96375 TX/PRO/DX INJ NEW DRUG ADDON: CPT

## 2018-05-18 PROCEDURE — 99285 EMERGENCY DEPT VISIT HI MDM: CPT

## 2018-05-18 PROCEDURE — 85379 FIBRIN DEGRADATION QUANT: CPT | Performed by: EMERGENCY MEDICINE

## 2018-05-18 PROCEDURE — 96361 HYDRATE IV INFUSION ADD-ON: CPT

## 2018-05-18 PROCEDURE — 81001 URINALYSIS AUTO W/SCOPE: CPT | Performed by: EMERGENCY MEDICINE

## 2018-05-18 PROCEDURE — 99218 HC RM OBSERVATION: CPT

## 2018-05-18 PROCEDURE — 71046 X-RAY EXAM CHEST 2 VIEWS: CPT

## 2018-05-18 PROCEDURE — 83605 ASSAY OF LACTIC ACID: CPT | Performed by: EMERGENCY MEDICINE

## 2018-05-18 PROCEDURE — 74011250636 HC RX REV CODE- 250/636: Performed by: EMERGENCY MEDICINE

## 2018-05-18 PROCEDURE — 82550 ASSAY OF CK (CPK): CPT | Performed by: PHYSICIAN ASSISTANT

## 2018-05-18 PROCEDURE — 83880 ASSAY OF NATRIURETIC PEPTIDE: CPT | Performed by: EMERGENCY MEDICINE

## 2018-05-18 PROCEDURE — 96374 THER/PROPH/DIAG INJ IV PUSH: CPT

## 2018-05-18 PROCEDURE — 85025 COMPLETE CBC W/AUTO DIFF WBC: CPT | Performed by: PHYSICIAN ASSISTANT

## 2018-05-18 PROCEDURE — A9540 TC99M MAA: HCPCS

## 2018-05-18 PROCEDURE — 94761 N-INVAS EAR/PLS OXIMETRY MLT: CPT

## 2018-05-18 PROCEDURE — 74011000250 HC RX REV CODE- 250: Performed by: EMERGENCY MEDICINE

## 2018-05-18 PROCEDURE — 65660000000 HC RM CCU STEPDOWN

## 2018-05-18 PROCEDURE — 80053 COMPREHEN METABOLIC PANEL: CPT | Performed by: PHYSICIAN ASSISTANT

## 2018-05-18 PROCEDURE — 84484 ASSAY OF TROPONIN QUANT: CPT | Performed by: PHYSICIAN ASSISTANT

## 2018-05-18 PROCEDURE — 36415 COLL VENOUS BLD VENIPUNCTURE: CPT | Performed by: PHYSICIAN ASSISTANT

## 2018-05-18 PROCEDURE — 82553 CREATINE MB FRACTION: CPT | Performed by: INTERNAL MEDICINE

## 2018-05-18 PROCEDURE — 93005 ELECTROCARDIOGRAM TRACING: CPT

## 2018-05-18 PROCEDURE — 93970 EXTREMITY STUDY: CPT

## 2018-05-18 RX ORDER — LISINOPRIL 5 MG/1
5 TABLET ORAL
Status: DISCONTINUED | OUTPATIENT
Start: 2018-05-19 | End: 2018-05-20

## 2018-05-18 RX ORDER — ONDANSETRON 2 MG/ML
4 INJECTION INTRAMUSCULAR; INTRAVENOUS
Status: DISCONTINUED | OUTPATIENT
Start: 2018-05-18 | End: 2018-05-20 | Stop reason: HOSPADM

## 2018-05-18 RX ORDER — ASPIRIN 325 MG
325 TABLET ORAL
Status: DISCONTINUED | OUTPATIENT
Start: 2018-05-18 | End: 2018-05-20 | Stop reason: HOSPADM

## 2018-05-18 RX ORDER — OXYCODONE AND ACETAMINOPHEN 5; 325 MG/1; MG/1
1 TABLET ORAL
Status: DISCONTINUED | OUTPATIENT
Start: 2018-05-18 | End: 2018-05-20 | Stop reason: HOSPADM

## 2018-05-18 RX ORDER — ASCORBIC ACID 500 MG
500 TABLET ORAL
COMMUNITY

## 2018-05-18 RX ORDER — POTASSIUM CITRATE 15 MEQ/1
15 TABLET, EXTENDED RELEASE ORAL
COMMUNITY

## 2018-05-18 RX ORDER — ACETAMINOPHEN 325 MG/1
650 TABLET ORAL
Status: DISCONTINUED | OUTPATIENT
Start: 2018-05-18 | End: 2018-05-20 | Stop reason: HOSPADM

## 2018-05-18 RX ORDER — VITAMIN E 268 MG
400 CAPSULE ORAL
COMMUNITY

## 2018-05-18 RX ORDER — FENOFIBRATE 145 MG/1
145 TABLET, COATED ORAL DAILY
Status: DISCONTINUED | OUTPATIENT
Start: 2018-05-19 | End: 2018-05-20 | Stop reason: HOSPADM

## 2018-05-18 RX ORDER — SODIUM CHLORIDE 0.9 % (FLUSH) 0.9 %
5-10 SYRINGE (ML) INJECTION EVERY 8 HOURS
Status: DISCONTINUED | OUTPATIENT
Start: 2018-05-18 | End: 2018-05-20 | Stop reason: HOSPADM

## 2018-05-18 RX ORDER — ALLOPURINOL 300 MG/1
225 TABLET ORAL
Status: DISCONTINUED | OUTPATIENT
Start: 2018-05-19 | End: 2018-05-18

## 2018-05-18 RX ORDER — NALOXONE HYDROCHLORIDE 0.4 MG/ML
0.4 INJECTION, SOLUTION INTRAMUSCULAR; INTRAVENOUS; SUBCUTANEOUS AS NEEDED
Status: DISCONTINUED | OUTPATIENT
Start: 2018-05-18 | End: 2018-05-20 | Stop reason: HOSPADM

## 2018-05-18 RX ORDER — ENOXAPARIN SODIUM 100 MG/ML
30 INJECTION SUBCUTANEOUS EVERY 24 HOURS
Status: DISCONTINUED | OUTPATIENT
Start: 2018-05-19 | End: 2018-05-20 | Stop reason: HOSPADM

## 2018-05-18 RX ORDER — FACIAL-BODY WIPES
10 EACH TOPICAL DAILY PRN
Status: DISCONTINUED | OUTPATIENT
Start: 2018-05-18 | End: 2018-05-20 | Stop reason: HOSPADM

## 2018-05-18 RX ORDER — FENOFIBRATE 160 MG/1
160 TABLET ORAL
COMMUNITY
End: 2018-07-23 | Stop reason: SDUPTHER

## 2018-05-18 RX ORDER — SODIUM CHLORIDE 0.9 % (FLUSH) 0.9 %
5-10 SYRINGE (ML) INJECTION AS NEEDED
Status: DISCONTINUED | OUTPATIENT
Start: 2018-05-18 | End: 2018-05-20 | Stop reason: HOSPADM

## 2018-05-18 RX ORDER — ALLOPURINOL 300 MG/1
225 TABLET ORAL
COMMUNITY
End: 2018-05-20

## 2018-05-18 RX ORDER — ASCORBIC ACID 500 MG
500 TABLET ORAL
Status: DISCONTINUED | OUTPATIENT
Start: 2018-05-19 | End: 2018-05-20 | Stop reason: HOSPADM

## 2018-05-18 RX ORDER — ATENOLOL 25 MG/1
12.5 TABLET ORAL
COMMUNITY
End: 2018-05-23 | Stop reason: ALTCHOICE

## 2018-05-18 RX ORDER — MORPHINE SULFATE 4 MG/ML
4 INJECTION INTRAVENOUS
Status: COMPLETED | OUTPATIENT
Start: 2018-05-18 | End: 2018-05-18

## 2018-05-18 RX ORDER — ATENOLOL 25 MG/1
12.5 TABLET ORAL
Status: DISCONTINUED | OUTPATIENT
Start: 2018-05-19 | End: 2018-05-20 | Stop reason: HOSPADM

## 2018-05-18 RX ORDER — ALLOPURINOL 100 MG/1
200 TABLET ORAL
Status: DISCONTINUED | OUTPATIENT
Start: 2018-05-19 | End: 2018-05-20 | Stop reason: HOSPADM

## 2018-05-18 RX ORDER — MELATONIN
500 2 TIMES DAILY
COMMUNITY

## 2018-05-18 RX ADMIN — Medication 10 ML: at 23:35

## 2018-05-18 RX ADMIN — SODIUM CHLORIDE 20 MG: 9 INJECTION INTRAMUSCULAR; INTRAVENOUS; SUBCUTANEOUS at 16:23

## 2018-05-18 RX ADMIN — SODIUM CHLORIDE 500 ML: 900 INJECTION, SOLUTION INTRAVENOUS at 16:23

## 2018-05-18 RX ADMIN — MORPHINE SULFATE 4 MG: 4 INJECTION INTRAVENOUS at 16:24

## 2018-05-18 NOTE — ED NOTES
CT called and states the patient's creatinine is too high and MD Tobi Cano is aware and CT can be cancelled.

## 2018-05-18 NOTE — ED PROVIDER NOTES
EMERGENCY DEPARTMENT HISTORY AND PHYSICAL EXAM      Date: 5/18/2018  Patient Name: Loretta Quijano. History of Presenting Illness     Chief Complaint   Patient presents with    Chest Pain     Ambulatory w/ c/o midsternal CP since about 0900 this morning, pt also reports feeling pulse in neck/jaw. Pt took 325 ASA this morning. Pt denies cardiac history. I have seen and evaluated this patient in the Express Care portion of triage for chest pain. The patient's care will begin now and orders have been placed. This patient will be seen and provided further care in the Emergency Room. Written by Paramjit Cuello, a scribe for [address] [place] [address]. Shawna Pacheco PA-C    History Provided By: Patient    HPI: Loretta Quijano., 77 y.o. male with PMHx significant for HTN, CKD, presents ambulatory to the ED with cc of dull moderate mid-sternal CP that is exacerbated with heavy breathing and associated neck and jaw pain since this morning. Pt states that he was fine before waking up this morning, when he noticed his CP, and notes that he can sense his pulse in his neck/jaw. He does not have a hx of cardiac disease, with his last stress test being years ago. He had last taken 325 mg ASA x2 this morning. Pt denies any SOB, recent prolonged travel, or leg swelling. There are no other complaints, changes, or physical findings at this time. PCP: Chen Yip MD    Current Outpatient Prescriptions   Medication Sig Dispense Refill    allopurinol (ZYLOPRIM) 300 mg tablet Take 225 mg by mouth daily (with lunch).  atenolol (TENORMIN) 25 mg tablet Take 12.5 mg by mouth daily (with lunch).  fenofibrate (LOFIBRA) 160 mg tablet Take 160 mg by mouth daily (with lunch).  Potassium Citrate (UROCIT-K) TbER tablet Take 15 mEq by mouth daily (with lunch).  vitamin E (AQUA GEMS) 400 unit capsule Take 400 Units by mouth daily (with lunch).       omega 3-dha-epa-fish oil (FISH OIL) 100-160-1,000 mg cap Take 1,000 mg by mouth daily (with lunch).  cholecalciferol (VITAMIN D3) 1,000 unit tablet Take 500 Units by mouth two (2) times a day.  ascorbic acid, vitamin C, (VITAMIN C) 500 mg tablet Take 500 mg by mouth daily (with lunch).  lisinopril (PRINIVIL, ZESTRIL) 5 mg tablet Take 5 mg by mouth daily (with lunch).  aspirin (ASPIRIN) 325 mg tablet Take 325 mg by mouth nightly. Past History     Past Medical History:  Past Medical History:   Diagnosis Date    Finding of rib structure     right lower rib cartilage weak    Hypertension     Other ill-defined conditions(319.89)     gout    Unspecified adverse effect of anesthesia     lays on back,chokes on drainage    Unspecified sleep apnea     septum surgery to fix, still an issue       Past Surgical History:  Past Surgical History:   Procedure Laterality Date    HX KNEE ARTHROSCOPY      HX OTHER SURGICAL      kidney stones       Family History:  Family History   Problem Relation Age of Onset    Diabetes Mother     Hypertension Father        Social History:  Social History   Substance Use Topics    Smoking status: Never Smoker    Smokeless tobacco: Never Used    Alcohol use No     Allergies:  No Known Allergies    Review of Systems   Review of Systems   Constitutional: Negative. Negative for chills and fever. HENT: Negative. Negative for congestion, facial swelling, rhinorrhea, sore throat, trouble swallowing and voice change. Eyes: Negative. Respiratory: Negative. Negative for apnea, cough, chest tightness, shortness of breath and wheezing. Cardiovascular: Positive for chest pain. Negative for palpitations and leg swelling. Gastrointestinal: Negative. Negative for abdominal distention, abdominal pain, blood in stool, constipation, diarrhea, nausea and vomiting. Endocrine: Negative. Negative for cold intolerance, heat intolerance and polyuria. Genitourinary: Negative.   Negative for difficulty urinating, dysuria, flank pain, frequency, hematuria and urgency. Musculoskeletal: Positive for neck pain. Negative for arthralgias, back pain, myalgias and neck stiffness. Skin: Negative. Negative for color change and rash. Neurological: Negative. Negative for dizziness, syncope, facial asymmetry, speech difficulty, weakness, light-headedness, numbness and headaches. Hematological: Negative. Does not bruise/bleed easily. Psychiatric/Behavioral: Negative. Negative for confusion and self-injury. The patient is not nervous/anxious. Physical Exam   Physical Exam   Constitutional: He is oriented to person, place, and time. Vital signs are normal. He appears well-developed and well-nourished. He is cooperative. Non-toxic appearance. No distress. HENT:   Head: Normocephalic and atraumatic. Mouth/Throat: Mucous membranes are normal. No posterior oropharyngeal erythema. Eyes: Conjunctivae and EOM are normal. Pupils are equal, round, and reactive to light. Neck: Normal range of motion. Cardiovascular: Normal rate, regular rhythm, normal heart sounds and intact distal pulses. Exam reveals no gallop and no friction rub. No murmur heard. Pulmonary/Chest: Effort normal and breath sounds normal. No respiratory distress. He has no wheezes. He has no rales. He exhibits no tenderness. Abdominal: Soft. Bowel sounds are normal. He exhibits no distension and no mass. There is no tenderness. There is no rebound and no guarding. Musculoskeletal: Normal range of motion. He exhibits no edema, tenderness or deformity. No peripheral edema   Neurological: He is alert and oriented to person, place, and time. He displays normal reflexes. No cranial nerve deficit. He exhibits normal muscle tone. Coordination normal.   Skin: Skin is warm. No rash noted. Psychiatric: He has a normal mood and affect. Nursing note and vitals reviewed.         Diagnostic Study Results     Labs -     Recent Results (from the past 12 hour(s))   EKG, 12 LEAD, INITIAL    Collection Time: 05/18/18 12:52 PM   Result Value Ref Range    Ventricular Rate 81 BPM    Atrial Rate 81 BPM    P-R Interval 156 ms    QRS Duration 90 ms    Q-T Interval 362 ms    QTC Calculation (Bezet) 420 ms    Calculated P Axis 53 degrees    Calculated R Axis -3 degrees    Calculated T Axis 27 degrees    Diagnosis       Normal sinus rhythm  Loss of anteroseptal forces  Nonspecific ST and T wave abnormality    When compared with ECG of 12-MAY-2010 09:44,  No significant change was found    Confirmed by Eduardo Torre ([id number]) on 5/18/2018 5:52:21 PM     CBC WITH AUTOMATED DIFF    Collection Time: 05/18/18  1:26 PM   Result Value Ref Range    WBC 10.2 4.1 - 11.1 K/uL    RBC 4.06 (L) 4.10 - 5.70 M/uL    HGB 13.6 12.1 - 17.0 g/dL    HCT 41.2 36.6 - 50.3 %    .5 (H) 80.0 - 99.0 FL    MCH 33.5 26.0 - 34.0 PG    MCHC 33.0 30.0 - 36.5 g/dL    RDW 14.0 11.5 - 14.5 %    PLATELET 510 082 - 503 K/uL    MPV 11.1 8.9 - 12.9 FL    NRBC 0.0 0  WBC    ABSOLUTE NRBC 0.00 0.00 - 0.01 K/uL    NEUTROPHILS 81 (H) 32 - 75 %    LYMPHOCYTES 10 (L) 12 - 49 %    MONOCYTES 7 5 - 13 %    EOSINOPHILS 1 0 - 7 %    BASOPHILS 0 0 - 1 %    IMMATURE GRANULOCYTES 0 0.0 - 0.5 %    ABS. NEUTROPHILS 8.3 (H) 1.8 - 8.0 K/UL    ABS. LYMPHOCYTES 1.0 0.8 - 3.5 K/UL    ABS. MONOCYTES 0.8 0.0 - 1.0 K/UL    ABS. EOSINOPHILS 0.1 0.0 - 0.4 K/UL    ABS. BASOPHILS 0.0 0.0 - 0.1 K/UL    ABS. IMM.  GRANS. 0.0 0.00 - 0.04 K/UL    DF AUTOMATED     METABOLIC PANEL, COMPREHENSIVE    Collection Time: 05/18/18  1:26 PM   Result Value Ref Range    Sodium 139 136 - 145 mmol/L    Potassium 4.5 3.5 - 5.1 mmol/L    Chloride 108 97 - 108 mmol/L    CO2 23 21 - 32 mmol/L    Anion gap 8 5 - 15 mmol/L    Glucose 151 (H) 65 - 100 mg/dL    BUN 32 (H) 6 - 20 MG/DL    Creatinine 2.54 (H) 0.70 - 1.30 MG/DL    BUN/Creatinine ratio 13 12 - 20      GFR est AA 31 (L) >60 ml/min/1.73m2    GFR est non-AA 25 (L) >60 ml/min/1.73m2    Calcium 8.9 8.5 - 10.1 MG/DL Bilirubin, total 0.6 0.2 - 1.0 MG/DL    ALT (SGPT) 27 12 - 78 U/L    AST (SGOT) 19 15 - 37 U/L    Alk. phosphatase 45 45 - 117 U/L    Protein, total 7.5 6.4 - 8.2 g/dL    Albumin 3.8 3.5 - 5.0 g/dL    Globulin 3.7 2.0 - 4.0 g/dL    A-G Ratio 1.0 (L) 1.1 - 2.2     CK W/ REFLX CKMB    Collection Time: 05/18/18  1:26 PM   Result Value Ref Range     39 - 308 U/L   TROPONIN I    Collection Time: 05/18/18  1:26 PM   Result Value Ref Range    Troponin-I, Qt. <0.04 <0.05 ng/mL   D DIMER    Collection Time: 05/18/18  4:26 PM   Result Value Ref Range    D-dimer 0.46 0.00 - 0.65 mg/L FEU   NT-PRO BNP    Collection Time: 05/18/18  4:26 PM   Result Value Ref Range    NT pro- (H) 0 - 125 PG/ML   LACTIC ACID    Collection Time: 05/18/18  4:26 PM   Result Value Ref Range    Lactic acid 1.0 0.4 - 2.0 MMOL/L   URINALYSIS W/ REFLEX CULTURE    Collection Time: 05/18/18  5:52 PM   Result Value Ref Range    Color YELLOW/STRAW      Appearance CLEAR CLEAR      Specific gravity 1.012 1.003 - 1.030      pH (UA) 6.5 5.0 - 8.0      Protein NEGATIVE  NEG mg/dL    Glucose NEGATIVE  NEG mg/dL    Ketone NEGATIVE  NEG mg/dL    Bilirubin NEGATIVE  NEG      Blood NEGATIVE  NEG      Urobilinogen 1.0 0.2 - 1.0 EU/dL    Nitrites NEGATIVE  NEG      Leukocyte Esterase NEGATIVE  NEG      WBC 0-4 0 - 4 /hpf    RBC 0-5 0 - 5 /hpf    Epithelial cells FEW FEW /lpf    Bacteria NEGATIVE  NEG /hpf    UA:UC IF INDICATED CULTURE NOT INDICATED BY UA RESULT CNI         Radiologic Studies -   XR CHEST PA LAT   Final Result      NM LUNG VENT/PERF    (Results Pending)   DUPLEX LOWER EXT VENOUS BILAT    (Results Pending)       CXR Results  (Last 48 hours)               05/18/18 1453  XR CHEST PA LAT Final result    Impression:  IMPRESSION: No acute abnormality and no change. Narrative:  EXAM:  XR CHEST PA LAT. INDICATION: Chest pain. COMPARISON: 5/12/2010. FINDINGS:    PA and lateral radiographs of the chest were obtained. Lungs:  The lungs are clear of mass, nodule, airspace disease or edema. Pleura: There is no pleural effusion or pneumothorax. Mediastinum: The cardiac and mediastinal contours and pulmonary vascularity are   normal.  The aorta is atherosclerotic. Bones and soft tissues: There are mild degenerative changes of the spine. Medical Decision Making   I am the first provider for this patient. I reviewed the vital signs, available nursing notes, past medical history, past surgical history, family history and social history. Vital Signs-Reviewed the patient's vital signs. Patient Vitals for the past 12 hrs:   Temp Pulse Resp BP SpO2   05/18/18 1839 - 65 18 - 96 %   05/18/18 1830 - 66 22 140/77 97 %   05/18/18 1816 - 70 21 140/71 96 %   05/18/18 1800 - 69 19 103/52 98 %   05/18/18 1745 - 67 17 128/75 97 %   05/18/18 1730 - 65 17 133/72 97 %   05/18/18 1715 - 66 13 124/64 98 %   05/18/18 1700 - 69 19 140/76 99 %   05/18/18 1645 - 67 11 141/78 97 %   05/18/18 1630 - 69 19 140/76 99 %   05/18/18 1615 - 68 17 143/73 99 %   05/18/18 1600 - 70 19 142/74 99 %   05/18/18 1555 99 °F (37.2 °C) - - - -   05/18/18 1548 - - - 129/72 -   05/18/18 1302 98.5 °F (36.9 °C) 80 14 157/81 98 %       Pulse Oximetry Analysis - 99% on room air    Cardiac Monitor:   Rate: 72 bpm  Rhythm: Normal Sinus Rhythm      EKG interpretation: (Preliminary)  12:52 PM  Rhythm: normal sinus rhythm; and regular . Rate (approx.): 81; Axis: normal; VT interval: normal; QRS interval: normal ; ST/T wave: normal; Other findings: normal.  Written by SUKUMAR Ornelas, as dictated by Rani Ellis MD.    Records Reviewed: Nursing Notes, Old Medical Records, Previous electrocardiograms, Previous Radiology Studies and Previous Laboratory Studies    Provider Notes (Medical Decision Making):   DDx: ACS, PNA, CHF, PE, muscle strain, KENNY    Pt is a 77year old male with hx of HTN, stage 3 CKD followed by Dr. Hilda Calderon, presents with CP and dyspnea on exertion. Will obtain EKG, cardiac enzymes, and labs. Concern for above, will obtain CT of chest, provide pain control and reassess. ED Course:   Initial assessment performed. The patients presenting problems have been discussed, and they are in agreement with the care plan formulated and outlined with them. I have encouraged them to ask questions as they arise throughout their visit. 4:34 PM  Notifed by nursing, pt has an elevated creatine, cannot do CTA, will obtain D-dimer    CONSULT NOTE:   7:03 PM  Velvet Somers MD spoke with Elder Nolasco MD   Specialty: Hospitalist  Discussed pt's hx, disposition, and available diagnostic and imaging results. Reviewed care plans. Consultant will evaluate pt for admission. Written by Sha Quintana ED Scribe, as dictated by Velvet Somers MD.    Disposition:  ADMIT NOTE:  7:03 PM  Patient is being admitted to the hospital by Dr. Dillon Guzman. The results of their tests and reasons for their admission have been discussed with them and/or available family. They convey agreement and understanding for the need to be admitted and for their admission diagnosis. Consultation has been made with the inpatient physician specialist for hospitalization. PLAN:  Admit to Hospitalist    Diagnosis     Clinical Impression:   1. KENNY (acute kidney injury) ([address]    2. Acute chest pain    3. Pleuritic chest pain    4. Precordial pain    5. CKD (chronic kidney disease) stage 3, GFR 30-59 ml/min    6. Dyslipidemia    7. Essential hypertension    8. Stage 3 acute kidney injury [facility])        Attestations: This note is prepared by Sha Quintana, acting as Scribe for Velvet Somers MD.    Velvet Somers MD: The scribe's documentation has been prepared under my direction and personally reviewed by me in its entirety. I confirm that the note above accurately reflects all work, treatment, procedures, and medical decision making performed by me. This note will not be viewable in [address]

## 2018-05-18 NOTE — IP AVS SNAPSHOT
[address] [facility] 
[phone] Patient: Homero Arndt. MRN: [id number] JPY:0/9/2525 About your hospitalization You were admitted on:  May 18, 2018 You last received care in the:  [facility] 2 CARDIOPULMONARY CARE You were discharged on:  May 20, 2018 Why you were hospitalized Your primary diagnosis was:  Chest Pain Your diagnoses also included:  Hypertension, Dyslipidemia, Pre-Diabetes, Ckd (Chronic Kidney Disease) Stage 3, Gfr 30-59 Ml/Min Follow-up Information Follow up With Details Comments Contact Info Lucille Moess MD Schedule an appointment as soon as possible for a visit in 1 week  [address] [facility] 
[phone] Abhi Werner MD Schedule an appointment as soon as possible for a visit in 1 week For follow up and stress test [address] 
[phone] Discharge Orders None A check jaime indicates which time of day the medication should be taken. My Medications START taking these medications Instructions Each Dose to Equal  
 Morning Noon Evening Bedtime  
 colchicine 0.6 mg tablet Take 1 Tab by mouth two (2) times a day. 0.6 mg  
    
   
   
   
  
  
CHANGE how you take these medications Instructions Each Dose to Equal  
 Morning Noon Evening Bedtime  
 allopurinol 100 mg tablet Commonly known as:  Siddharth Osborn What changed:   
- medication strength 
- how much to take - when to take this - Another medication with the same name was removed. Continue taking this medication, and follow the directions you see here. Take 2 Tabs by mouth daily. 200 mg  
    
   
   
   
  
 atenolol 25 mg tablet Commonly known as:  TENORMIN What changed:  Another medication with the same name was removed.  Continue taking this medication, and follow the directions you see here. Your last dose was:  5/20/18 at 12:15 pm  
Your next dose is:  5/21/18 at lunch time Take 12.5 mg by mouth daily (with lunch). 12.5 mg  
    
   
   
   
  
 fenofibrate 160 mg tablet Commonly known as:  LOFIBRA What changed:  Another medication with the same name was removed. Continue taking this medication, and follow the directions you see here. Your last dose was:  5/20/18 at 12:15 pm  
Your next dose is:  5/21/18 with lunch Take 160 mg by mouth daily (with lunch). 160 mg  
    
   
   
   
  
 lisinopril 2.5 mg tablet Commonly known as:  Velora Martín Start taking on:  5/21/2018 What changed:   
- medication strength 
- how much to take - when to take this Take 1 Tab by mouth daily. 2.5 mg  
    
   
   
   
  
  
CONTINUE taking these medications Instructions Each Dose to Equal  
 Morning Noon Evening Bedtime  
 ascorbic acid (vitamin C) 500 mg tablet Commonly known as:  VITAMIN C Your last dose was:  5/20/18 at 12:15 pm  
Your next dose is:  5/21/18 with lunch Take 500 mg by mouth daily (with lunch). 500 mg  
    
   
   
   
  
 aspirin 325 mg tablet Commonly known as:  ASPIRIN Your last dose was:  5/19/18 at 9:05 pm  
Your next dose is:  5/20/18 at bedtime Take 325 mg by mouth nightly. 325 mg  
    
   
   
   
  
 cholecalciferol 1,000 unit tablet Commonly known as:  VITAMIN D3 Take 500 Units by mouth two (2) times a day. 500 Units FISH -160-1,000 mg Cap Generic drug:  omega 3-dha-epa-fish oil Take 1,000 mg by mouth daily (with lunch). 1000 mg Potassium Citrate Tber tablet Commonly known as:  [employer] Take 15 mEq by mouth daily (with lunch). 15 mEq  
    
   
   
   
  
 vitamin E 400 unit capsule Commonly known as:  [address]  
   
 Take 400 Units by mouth daily (with lunch). 400 Units Where to Get Your Medications These medications were sent to [address], [address]  [address] [facility], [address] [place] [address] Phone:  [phone]  
  allopurinol 100 mg tablet  
 colchicine 0.6 mg tablet  
 lisinopril 2.5 mg tablet Discharge Instructions [facility]. [address] [facility], [address] 
[phone] Patient Discharge Instructions Heather Velasquez. / [id number] : 1950 Admitted 2018 Discharged: 2018 Take Home Medications · It is important that you take the medication exactly as they are prescribed. · Keep your medication in the bottles provided by the pharmacist and keep a list of the medication names, dosages, and times to be taken in your wallet. · Do not take other medications without consulting your doctor. What to do at [facility] Recommended diet: Cardiac Diet, Recommended activity: Activity as tolerated, Follow-up with Dr Carol Powell and cardiologist in 1 week Information obtained by : 
I understand that if any problems occur once I am at home I am to contact my physician. I understand and acknowledge receipt of the instructions indicated above. Physician's or R.N.'s Signature                                                                  Date/Time Patient or Representative Signature                                                          Date/Time MyChart Announcement  We are excited to announce that we are making your provider's discharge notes available to you in Suso. You will see these notes when they are completed and signed by the physician that discharged you from your recent hospital stay. If you have any questions or concerns about any information you see in Suso, please call the Health Information Department where you were seen or reach out to your Primary Care Provider for more information about your plan of care. Introducing [facility] HE[facility]! [employer] introduces Suso patient portal. Now you can access parts of your medical record, email your doctor's office, and request medication refills online. 1. In your internet browser, go to [email or web address] 2. Click on the First Time User? Click Here link in the Sign In box. You will see the New Member Sign Up page. 3. Enter your Suso Access Code exactly as it appears below. You will not need to use this code after youve completed the sign-up process. If you do not sign up before the expiration date, you must request a new code. · Suso Access Code: [email or web address] Expires: 8/16/2018  1:02 PM 
 
4. Enter the last four digits of your Social Security Number (xxxx) and Date of Birth (mm/dd/yyyy) as indicated and click Submit. You will be taken to the next sign-up page. 5. Create a Vinnyt ID. This will be your Suso login ID and cannot be changed, so think of one that is secure and easy to remember. 6. Create a Suso password. You can change your password at any time. 7. Enter your Password Reset Question and Answer. This can be used at a later time if you forget your password. 8. Enter your e-mail address. You will receive e-mail notification when new information is available in [address] 9. Click Sign Up. You can now view and download portions of your medical record. 10. Click the Download Summary menu link to download a portable copy of your medical information. If you have questions, please visit the Frequently Asked Questions section of the MyChart website. Remember, [employer] is NOT to be used for urgent needs. For medical emergencies, dial 911. Now available from your iPhone and Android! Introducing Moiz Benitez As a [facility] patient, I wanted to make you aware of our electronic visit tool called Moiz Benitez. [facility] 24/7 allows you to connect within minutes with a medical provider 24 hours a day, seven days a week via a mobile device or tablet or logging into a secure website from your computer. You can access Moiz Benitez from anywhere in the [place]. A virtual visit might be right for you when you have a simple condition and feel like you just dont want to get out of bed, or cant get away from work for an appointment, when your regular [facility] provider is not available (evenings, weekends or holidays), or when youre out of town and need minor care. Electronic visits cost only $49 and if the [facility] Encision/7 provider determines a prescription is needed to treat your condition, one can be electronically transmitted to a nearby pharmacy*. Please take a moment to enroll today if you have not already done so. The enrollment process is free and takes just a few minutes. To enroll, please download the [facility] Encision/Vocent karin to your tablet or phone, or visit [email or web address] to enroll on your computer. And, as an [address] [place] [address] patient with a Circle account, the results of your visits will be scanned into your electronic medical record and your primary care provider will be able to view the scanned results. We urge you to continue to see your regular [facility] provider for your ongoing medical care.   And while your primary care provider may not be the one available when you seek a Moiz Benitez virtual visit, the peace of mind you get from getting a real diagnosis real time can be priceless. For more information on Moiz Benitez, view our Frequently Asked Questions (FAQs) at [email or web address]. Sincerely, 
 
Flori Dawkins MD 
Chief Medical Officer [address] *:  certain medications cannot be prescribed via Moiz Benitez Providers Seen During Your Hospitalization Provider Specialty Primary office phone Terry Bowman DO Emergency Medicine [phone] Jerardo Steele MD Emergency Medicine [phone] Trav Monahan MD Hospitalist [phone] Omayra Mulligan MD Internal Medicine [phone] Your Primary Care Physician (PCP) Primary Care Physician Office Phone Office Fax Sonja Andres [phone] You are allergic to the following No active allergies Recent Documentation Height Weight BMI Smoking Status 1.676 m 85.5 kg 30.42 kg/m2 Never Smoker Emergency Contacts Name Discharge Info Relation Home Work Mobile [facility] DISCHARGE CAREGIVER [3] Spouse [3] [phone] Patient Belongings The following personal items are in your possession at time of discharge: 
  Dental Appliances: None  Visual Aid: Glasses, With patient      Home Medications: None   Jewelry: None  Clothing: At bedside    Other Valuables: Cell Phone Please provide this summary of care documentation to your next provider. Signatures-by signing, you are acknowledging that this After Visit Summary has been reviewed with you and you have received a copy. Patient Signature:  ____________________________________________________________ Date:  ____________________________________________________________  
  
[facility] Provider Signature:  ____________________________________________________________ Date:  ____________________________________________________________

## 2018-05-18 NOTE — ED NOTES
Received patient to exam room, sitting in a chair in a position of comfort, call bell within reach; pt reports substernal chest \"stiffness\" upon awakening this am, pt states his jaw aching, denies nausea or SOB

## 2018-05-18 NOTE — PROGRESS NOTES
Pharmacy Clarification of Prior to Admission Medication Regimen     The patient was interviewed regarding clarification of the prior to admission medication regimen. Wife was present in room and obtained permission from patient to discuss drug regimen with visitor(s) present. Patient was questioned regarding use of any other inhalers, topical products, over the counter medications, herbal medications, vitamin products or ophthalmic/nasal/otic medication use. Information Obtained From: Patient, RX Query    Pertinent Pharmacy Findings:   aspirin (ASPIRIN) 325 mg tablet: Patient stated he 'took an extra one this morning (5/18/18)' when his 'chest pain started'. PTA medication list was corrected to the following:     Prior to Admission Medications   Prescriptions Last Dose Informant Patient Reported? Taking? Potassium Citrate (UROCIT-K) TbER tablet 5/18/2018 at 1200 Self Yes Yes   Sig: Take 15 mEq by mouth daily (with lunch). allopurinol (ZYLOPRIM) 300 mg tablet 5/18/2018 at 1200 Self Yes Yes   Sig: Take 225 mg by mouth daily (with lunch). ascorbic acid, vitamin C, (VITAMIN C) 500 mg tablet 5/18/2018 at 1200 Self Yes Yes   Sig: Take 500 mg by mouth daily (with lunch). aspirin (ASPIRIN) 325 mg tablet 5/18/2018 at Unknown time Self Yes Yes   Sig: Take 325 mg by mouth nightly. atenolol (TENORMIN) 25 mg tablet 5/18/2018 at 1200 Self Yes Yes   Sig: Take 12.5 mg by mouth daily (with lunch). cholecalciferol (VITAMIN D3) 1,000 unit tablet 5/18/2018 at 1200 Self Yes Yes   Sig: Take 500 Units by mouth two (2) times a day. fenofibrate (LOFIBRA) 160 mg tablet 5/18/2018 at 1200 Self Yes Yes   Sig: Take 160 mg by mouth daily (with lunch). lisinopril (PRINIVIL, ZESTRIL) 5 mg tablet 5/18/2018 at 1200 Self Yes Yes   Sig: Take 5 mg by mouth daily (with lunch). omega 3-dha-epa-fish oil (FISH OIL) 100-160-1,000 mg cap 5/18/2018 at 1200 Self Yes Yes   Sig: Take 1,000 mg by mouth daily (with lunch).    vitamin E (AQUA GEMS) 400 unit capsule 5/18/2018 at 1200 Self Yes Yes   Sig: Take 400 Units by mouth daily (with lunch).       Facility-Administered Medications: None          Thank you,  Patt Benton CPhT  Medication History Pharmacy Technician

## 2018-05-18 NOTE — IP AVS SNAPSHOT
[address] [facility] 
[phone] Patient: Homero Arndt. MRN: [id number] WFQ:8/8/1093 A check jaime indicates which time of day the medication should be taken. My Medications START taking these medications Instructions Each Dose to Equal  
 Morning Noon Evening Bedtime  
 colchicine 0.6 mg tablet Take 1 Tab by mouth two (2) times a day. 0.6 mg  
    
   
   
   
  
  
CHANGE how you take these medications Instructions Each Dose to Equal  
 Morning Noon Evening Bedtime  
 allopurinol 100 mg tablet Commonly known as:  Siddharth Osborn What changed:   
- medication strength 
- how much to take - when to take this - Another medication with the same name was removed. Continue taking this medication, and follow the directions you see here. Take 2 Tabs by mouth daily. 200 mg  
    
   
   
   
  
 atenolol 25 mg tablet Commonly known as:  TENORMIN What changed:  Another medication with the same name was removed. Continue taking this medication, and follow the directions you see here. Your last dose was:  5/20/18 at 12:15 pm  
Your next dose is:  5/21/18 at lunch time Take 12.5 mg by mouth daily (with lunch). 12.5 mg  
    
   
   
   
  
 fenofibrate 160 mg tablet Commonly known as:  LOFIBRA What changed:  Another medication with the same name was removed. Continue taking this medication, and follow the directions you see here. Your last dose was:  5/20/18 at 12:15 pm  
Your next dose is:  5/21/18 with lunch Take 160 mg by mouth daily (with lunch). 160 mg  
    
   
   
   
  
 lisinopril 2.5 mg tablet Commonly known as:  Pradip Kang Start taking on:  5/21/2018 What changed:   
- medication strength 
- how much to take - when to take this Take 1 Tab by mouth daily. 2.5 mg  
    
   
   
   
  
  
CONTINUE taking these medications Instructions Each Dose to Equal  
 Morning Noon Evening Bedtime  
 ascorbic acid (vitamin C) 500 mg tablet Commonly known as:  VITAMIN C Your last dose was:  5/20/18 at 12:15 pm  
Your next dose is:  5/21/18 with lunch Take 500 mg by mouth daily (with lunch). 500 mg  
    
   
   
   
  
 aspirin 325 mg tablet Commonly known as:  ASPIRIN Your last dose was:  5/19/18 at 9:05 pm  
Your next dose is:  5/20/18 at bedtime Take 325 mg by mouth nightly. 325 mg  
    
   
   
   
  
 cholecalciferol 1,000 unit tablet Commonly known as:  VITAMIN D3 Take 500 Units by mouth two (2) times a day. 500 Units FISH -160-1,000 mg Cap Generic drug:  omega 3-dha-epa-fish oil Take 1,000 mg by mouth daily (with lunch). 1000 mg Potassium Citrate Tber tablet Commonly known as:  [employer] Take 15 mEq by mouth daily (with lunch). 15 mEq  
    
   
   
   
  
 vitamin E 400 unit capsule Commonly known as:  [address] Take 400 Units by mouth daily (with lunch). 400 Units Where to Get Your Medications These medications were sent to  [facility], [address] [place] [address] Avenue  [address] [facility], [address] [place] [address] Phone:  [phone]  
  allopurinol 100 mg tablet  
 colchicine 0.6 mg tablet  
 lisinopril 2.5 mg tablet

## 2018-05-18 NOTE — ED NOTES
Assumed care of pt. Pt reports lying still the pain in center of his chest is not as bad as when he does take a deep breathe. Pt feels his neck and throat gets sore and then his bilat jaws since pt woke up. Pt reports yesterday he was changing light bulbs and felt fine so is not sure if that could have triggered his pain he does workout but hasn't today. Denies SOB.     Took all prescribed meds PTA at 12pm.

## 2018-05-19 LAB
ALBUMIN SERPL-MCNC: 3.6 G/DL (ref 3.5–5)
ALBUMIN/GLOB SERPL: 1 {RATIO} (ref 1.1–2.2)
ALP SERPL-CCNC: 39 U/L (ref 45–117)
ALT SERPL-CCNC: 24 U/L (ref 12–78)
ANION GAP SERPL CALC-SCNC: 6 MMOL/L (ref 5–15)
AST SERPL-CCNC: 16 U/L (ref 15–37)
BASOPHILS # BLD: 0 K/UL (ref 0–0.1)
BASOPHILS NFR BLD: 0 % (ref 0–1)
BILIRUB SERPL-MCNC: 0.9 MG/DL (ref 0.2–1)
BUN SERPL-MCNC: 31 MG/DL (ref 6–20)
BUN/CREAT SERPL: 15 (ref 12–20)
CALCIUM SERPL-MCNC: 9.1 MG/DL (ref 8.5–10.1)
CHLORIDE SERPL-SCNC: 111 MMOL/L (ref 97–108)
CHOLEST SERPL-MCNC: 150 MG/DL
CK MB CFR SERPL CALC: 0.9 % (ref 0–2.5)
CK MB SERPL-MCNC: 1.6 NG/ML (ref 5–25)
CK SERPL-CCNC: 183 U/L (ref 39–308)
CO2 SERPL-SCNC: 22 MMOL/L (ref 21–32)
CREAT SERPL-MCNC: 2.13 MG/DL (ref 0.7–1.3)
DIFFERENTIAL METHOD BLD: ABNORMAL
EOSINOPHIL # BLD: 0.2 K/UL (ref 0–0.4)
EOSINOPHIL NFR BLD: 3 % (ref 0–7)
ERYTHROCYTE [DISTWIDTH] IN BLOOD BY AUTOMATED COUNT: 14.3 % (ref 11.5–14.5)
ERYTHROCYTE [SEDIMENTATION RATE] IN BLOOD: 11 MM/HR (ref 0–20)
GLOBULIN SER CALC-MCNC: 3.6 G/DL (ref 2–4)
GLUCOSE SERPL-MCNC: 110 MG/DL (ref 65–100)
HCT VFR BLD AUTO: 39 % (ref 36.6–50.3)
HDLC SERPL-MCNC: 31 MG/DL
HDLC SERPL: 4.8 {RATIO} (ref 0–5)
HGB BLD-MCNC: 13 G/DL (ref 12.1–17)
IMM GRANULOCYTES # BLD: 0 K/UL (ref 0–0.04)
IMM GRANULOCYTES NFR BLD AUTO: 0 % (ref 0–0.5)
LDLC SERPL CALC-MCNC: 84.2 MG/DL (ref 0–100)
LIPID PROFILE,FLP: ABNORMAL
LYMPHOCYTES # BLD: 2.2 K/UL (ref 0.8–3.5)
LYMPHOCYTES NFR BLD: 29 % (ref 12–49)
MCH RBC QN AUTO: 33.9 PG (ref 26–34)
MCHC RBC AUTO-ENTMCNC: 33.3 G/DL (ref 30–36.5)
MCV RBC AUTO: 101.8 FL (ref 80–99)
MONOCYTES # BLD: 0.9 K/UL (ref 0–1)
MONOCYTES NFR BLD: 12 % (ref 5–13)
NEUTS SEG # BLD: 4.2 K/UL (ref 1.8–8)
NEUTS SEG NFR BLD: 56 % (ref 32–75)
NRBC # BLD: 0 K/UL (ref 0–0.01)
NRBC BLD-RTO: 0 PER 100 WBC
PLATELET # BLD AUTO: 161 K/UL (ref 150–400)
PMV BLD AUTO: 11.6 FL (ref 8.9–12.9)
POTASSIUM SERPL-SCNC: 4.2 MMOL/L (ref 3.5–5.1)
PROT SERPL-MCNC: 7.2 G/DL (ref 6.4–8.2)
RBC # BLD AUTO: 3.83 M/UL (ref 4.1–5.7)
SODIUM SERPL-SCNC: 139 MMOL/L (ref 136–145)
TRIGL SERPL-MCNC: 174 MG/DL (ref ?–150)
TROPONIN I SERPL-MCNC: <0.04 NG/ML
VLDLC SERPL CALC-MCNC: 34.8 MG/DL
WBC # BLD AUTO: 7.6 K/UL (ref 4.1–11.1)

## 2018-05-19 PROCEDURE — 82550 ASSAY OF CK (CPK): CPT | Performed by: INTERNAL MEDICINE

## 2018-05-19 PROCEDURE — G8979 MOBILITY GOAL STATUS: HCPCS

## 2018-05-19 PROCEDURE — 80061 LIPID PANEL: CPT | Performed by: INTERNAL MEDICINE

## 2018-05-19 PROCEDURE — 85025 COMPLETE CBC W/AUTO DIFF WBC: CPT | Performed by: INTERNAL MEDICINE

## 2018-05-19 PROCEDURE — 96372 THER/PROPH/DIAG INJ SC/IM: CPT

## 2018-05-19 PROCEDURE — G8978 MOBILITY CURRENT STATUS: HCPCS

## 2018-05-19 PROCEDURE — 80053 COMPREHEN METABOLIC PANEL: CPT | Performed by: INTERNAL MEDICINE

## 2018-05-19 PROCEDURE — 85652 RBC SED RATE AUTOMATED: CPT | Performed by: INTERNAL MEDICINE

## 2018-05-19 PROCEDURE — 74011250636 HC RX REV CODE- 250/636: Performed by: INTERNAL MEDICINE

## 2018-05-19 PROCEDURE — G8980 MOBILITY D/C STATUS: HCPCS

## 2018-05-19 PROCEDURE — 99218 HC RM OBSERVATION: CPT

## 2018-05-19 PROCEDURE — 74011250637 HC RX REV CODE- 250/637: Performed by: INTERNAL MEDICINE

## 2018-05-19 PROCEDURE — 97161 PT EVAL LOW COMPLEX 20 MIN: CPT

## 2018-05-19 PROCEDURE — 36415 COLL VENOUS BLD VENIPUNCTURE: CPT | Performed by: INTERNAL MEDICINE

## 2018-05-19 PROCEDURE — 84484 ASSAY OF TROPONIN QUANT: CPT | Performed by: INTERNAL MEDICINE

## 2018-05-19 PROCEDURE — 65660000000 HC RM CCU STEPDOWN

## 2018-05-19 RX ORDER — COLCHICINE 0.6 MG/1
0.6 TABLET ORAL 2 TIMES DAILY
Status: DISCONTINUED | OUTPATIENT
Start: 2018-05-19 | End: 2018-05-20 | Stop reason: HOSPADM

## 2018-05-19 RX ADMIN — FENOFIBRATE 145 MG: 145 TABLET ORAL at 08:32

## 2018-05-19 RX ADMIN — ASPIRIN 325 MG: 325 TABLET ORAL at 21:03

## 2018-05-19 RX ADMIN — Medication 10 ML: at 14:23

## 2018-05-19 RX ADMIN — COLCHICINE 0.6 MG: 0.6 TABLET, FILM COATED ORAL at 17:16

## 2018-05-19 RX ADMIN — ALLOPURINOL 200 MG: 100 TABLET ORAL at 11:57

## 2018-05-19 RX ADMIN — LISINOPRIL 5 MG: 5 TABLET ORAL at 11:57

## 2018-05-19 RX ADMIN — ENOXAPARIN SODIUM 30 MG: 30 INJECTION SUBCUTANEOUS at 08:32

## 2018-05-19 RX ADMIN — ASPIRIN 325 MG: 325 TABLET ORAL at 00:19

## 2018-05-19 RX ADMIN — OXYCODONE HYDROCHLORIDE AND ACETAMINOPHEN 500 MG: 500 TABLET ORAL at 11:57

## 2018-05-19 RX ADMIN — Medication 10 ML: at 21:03

## 2018-05-19 RX ADMIN — ATENOLOL 12.5 MG: 25 TABLET ORAL at 11:57

## 2018-05-19 NOTE — PROGRESS NOTES
Problem: Falls - Risk of  Goal: *Absence of Falls  Document Roel Fall Risk and appropriate interventions in the flowsheet.    Outcome: Progressing Towards Goal  Fall Risk Interventions:            Medication Interventions: Evaluate medications/consider consulting pharmacy, Patient to call before getting OOB, Teach patient to arise slowly

## 2018-05-19 NOTE — PROGRESS NOTES
[address] [place] [address]  [phone]      Medical Progress Note      NAME: Yevgeniy Smiley. :  1950  MRM:  [id number]    Date/Time: 2018  6:38 AM         Subjective:     Admitted with chest pain which was pleuritic and worsening, which eventually began to radiate into neck, jaw,face. Pain has now largely resolved. Workup negative so far. AVVS.    Past Medical History:   Diagnosis Date    Finding of rib structure     right lower rib cartilage weak    Hypertension     Other ill-defined conditions(569.89)     gout    Unspecified adverse effect of anesthesia     lays on back,chokes on drainage    Unspecified sleep apnea     septum surgery to fix, still an issue       ROS:  General: negative for fever, chills, sweats, weakness  Respiratory:  negative for cough, sputum production, SOB, wheezing, BACA, pleuritic pain  Cardiology:  negative for chest pain, palpitations, orthopnea, PND, edema, syncope   Gastrointestinal: negative for abdominal pain, N/V, dysphagia, change in bowel habits, bleeding  Genitourinary: negative for frequency, urgency, dysuria, hematuria, incontinence  Muskuloskeletal : negative for arthralgia, myalgia  Dermatological: negative for rash, ulceration, mole change, new lesion  Neurological: negative for headache, dizziness, confusion, focal weakness, paresthesia, memory loss, gait disturbance  Psychological: negative for anxiety, depression, agitation  Review of systems otherwise negative         Objective:       Vitals:        Last 24hrs VS reviewed since prior progress note.  Most recent are:    Visit Vitals    /58    Pulse 60    Temp 97.8 °F (36.6 °C)    Resp 18    Ht 5' 6\" (1.676 m)    Wt 188 lb 7.9 oz (85.5 kg)    SpO2 99%    BMI 30.42 kg/m2     SpO2 Readings from Last 6 Encounters:   18 99%   18 95%   12 97%   05/27/10 98%   05/12/10 99%            Intake/Output Summary (Last 24 hours) at 05/19/18 0645  Last data filed at 05/19/18 0124   Gross per 24 hour   Intake                0 ml   Output              600 ml   Net             -600 ml        Telemetry reviewed:   normal sinus rhythm  Tubes:   N/A  X-Ray:  Chest xray - No acute abnormality and no change    LE duplex - NORMAL DUPLEX VENOUS DOPPLER EXAMINATION    NM V/Q scan - Low probability for pulmonary embolism    Procedures:   N/A    Exam:     General   well developed, well nourished, appears stated age, in no acute distress  Neck   Supple without nodes or mass. No thyromegaly or bruit  Respiratory   Clear To Auscultation bilaterally - no wheezes, rales, rhonchi, or crackles  Cardiology  Regular Rate and Rythmn  - no murmurs, rubs or gallops  Abdominal  Soft, non-tender, non-distended, positive bowel sounds, no hepatosplenomegaly  Extremities  No clubbing, cyanosis, or edema. Pulses intact. Skin  Normal skin turgor. No rashes or skin ulcers noted  Neurological  No focal neurological deficits noted  Psychological  Oriented x 3. Normal affect.   Exam otherwise negative     Lab Data Reviewed: (see below)    Medications Reviewed: (see below)    ______________________________________________________________________    Medications:     Current Facility-Administered Medications   Medication Dose Route Frequency    sodium chloride (NS) flush 5-10 mL  5-10 mL IntraVENous Q8H    sodium chloride (NS) flush 5-10 mL  5-10 mL IntraVENous PRN    acetaminophen (TYLENOL) tablet 650 mg  650 mg Oral Q6H PRN    oxyCODONE-acetaminophen (PERCOCET) 5-325 mg per tablet 1 Tab  1 Tab Oral Q6H PRN    naloxone (NARCAN) injection 0.4 mg  0.4 mg IntraVENous PRN    ondansetron (ZOFRAN) injection 4 mg  4 mg IntraVENous Q4H PRN    bisacodyl (DULCOLAX) suppository 10 mg  10 mg Rectal DAILY PRN    enoxaparin (LOVENOX) injection 30 mg  30 mg SubCUTAneous Q24H    atenolol (TENORMIN) tablet 12.5 mg  12.5 mg Oral DAILY WITH LUNCH    lisinopril (PRINIVIL, ZESTRIL) tablet 5 mg  5 mg Oral DAILY WITH LUNCH    aspirin (ASPIRIN) tablet 325 mg  325 mg Oral QHS    fenofibrate nanocrystallized (TRICOR) tablet 145 mg  145 mg Oral DAILY    ascorbic acid (vitamin C) (VITAMIN C) tablet 500 mg  500 mg Oral DAILY WITH LUNCH    allopurinol (ZYLOPRIM) tablet 200 mg  200 mg Oral DAILY WITH LUNCH            Lab Review:     Recent Labs      05/19/18   0333  05/18/18   1326   WBC  7.6  10.2   HGB  13.0  13.6   HCT  39.0  41.2   PLT  161  181     Recent Labs      05/19/18   0333  05/18/18   1326   NA  139  139   K  4.2  4.5   CL  111*  108   CO2  22  23   GLU  110*  151*   BUN  31*  32*   CREA  2.13*  2.54*   CA  9.1  8.9   ALB  3.6  3.8   TBILI  0.9  0.6   SGOT  16  19   ALT  24  27     No results found for: GLUCPOC  No results for input(s): PH, PCO2, PO2, HCO3, FIO2 in the last 72 hours. No results for input(s): INR in the last 72 hours. No lab exists for component: INREXT, INREXT         Assessment:     Principal Problem:    Chest pain (5/18/2018)    Active Problems:    Hypertension (7/23/2017)      CKD (chronic kidney disease) stage 3, GFR 30-59 ml/min (7/23/2017)      Dyslipidemia (7/23/2017)      Pre-diabetes (7/23/2017)           Plan:     Risk of deterioration: medium             1. Cardiac enzymes negative to date  2. Differential includes: [place], pleurisy, pericarditis, PE, aneurysm, dissection, musculoskelatal  3. CT not done due to CKD  4. Echo already ordered  5.  Cardiology consult pending                      Care Plan discussed with: Patient    Discussed:  Care Plan    Prophylaxis:  Lovenox    Disposition:  Home w/Family           ___________________________________________________    Attending Physician: Ailyn Cook MD

## 2018-05-19 NOTE — PROGRESS NOTES
Bedside and Verbal shift change report given to PRAKASH Sharp (oncoming nurse) by Josselin Sotelo RN (offgoing nurse). Report included the following information SBAR, Kardex, [facility] and Cardiac Rhythm Sinus Bernadette Pinto.

## 2018-05-19 NOTE — PROGRESS NOTES
TRANSFER - IN REPORT:    Verbal report received from Jez Lowe RN(name) on [employer].  being received from ED(unit) for routine progression of care      Report consisted of patients Situation, Background, Assessment and   Recommendations(SBAR). Information from the following report(s) SBAR, Kardex, ED Summary, Intake/Output, Recent Results and Cardiac Rhythm NSR was reviewed with the receiving nurse. Opportunity for questions and clarification was provided. Assessment completed upon patients arrival to unit and care assumed. Primary Nurse Estrella De La Cruz and Zahra Peterson RN performed a dual skin assessment on this patient No impairment noted  Dinesh score is 21        Bedside shift change report given to Aysha Rossi RN (oncoming nurse) by Martha Wilson RN (offgoing nurse). Report included the following information SBAR, Kardex, Intake/Output, MAR, Recent Results and Cardiac Rhythm NSR.

## 2018-05-19 NOTE — CONSULTS
Subjective:      Date of  Admission: 5/18/2018  1:17 PM     Admission type:Emergency    Ame Espinosa is a 77 y.o. male admitted for Chest pain. Pain started couple of days ago, substernal, worse on coughing and leaning forward, better on leaning back. No associated SOB, palpitations, irregular heart beats, near-syncope, syncope, fatigue, orthopnea, paroxysmal nocturnal dyspnea, claudication, lower extremity edema, tachypnea. Did notice neck and jaw pain few times.      Patient Active Problem List    Diagnosis Date Noted    Chest pain 05/18/2018    Hypertension 07/23/2017    CKD (chronic kidney disease) stage 3, GFR 30-59 ml/min 07/23/2017    Dyslipidemia 07/23/2017    Gout 07/23/2017    Pre-diabetes 07/23/2017    Recurrent kidney stones 07/23/2017      Justin Rubi MD  Past Medical History:   Diagnosis Date    Finding of rib structure     right lower rib cartilage weak    Hypertension     Other ill-defined conditions(799.89)     gout    Unspecified adverse effect of anesthesia     lays on back,chokes on drainage    Unspecified sleep apnea     septum surgery to fix, still an issue      Past Surgical History:   Procedure Laterality Date    HX KNEE ARTHROSCOPY      HX OTHER SURGICAL      kidney stones     No Known Allergies   Family History   Problem Relation Age of Onset    Diabetes Mother     Hypertension Father       Current Facility-Administered Medications   Medication Dose Route Frequency    colchicine tablet 0.6 mg  0.6 mg Oral BID    sodium chloride (NS) flush 5-10 mL  5-10 mL IntraVENous Q8H    sodium chloride (NS) flush 5-10 mL  5-10 mL IntraVENous PRN    acetaminophen (TYLENOL) tablet 650 mg  650 mg Oral Q6H PRN    oxyCODONE-acetaminophen (PERCOCET) 5-325 mg per tablet 1 Tab  1 Tab Oral Q6H PRN    naloxone (NARCAN) injection 0.4 mg  0.4 mg IntraVENous PRN    ondansetron (ZOFRAN) injection 4 mg  4 mg IntraVENous Q4H PRN    bisacodyl (DULCOLAX) suppository 10 mg  10 mg Rectal DAILY PRN    enoxaparin (LOVENOX) injection 30 mg  30 mg SubCUTAneous Q24H    atenolol (TENORMIN) tablet 12.5 mg  12.5 mg Oral DAILY WITH LUNCH    lisinopril (PRINIVIL, ZESTRIL) tablet 5 mg  5 mg Oral DAILY WITH LUNCH    aspirin (ASPIRIN) tablet 325 mg  325 mg Oral QHS    fenofibrate nanocrystallized (TRICOR) tablet 145 mg  145 mg Oral DAILY    ascorbic acid (vitamin C) (VITAMIN C) tablet 500 mg  500 mg Oral DAILY WITH LUNCH    allopurinol (ZYLOPRIM) tablet 200 mg  200 mg Oral DAILY WITH LUNCH         Review of Symptoms:  Constitutional: negative  Eyes: negative  Ears, nose, mouth, throat, and face: negative  Respiratory: No exertional dyspnea, orthopnea, PND, cough, hemoptysis, URI. Cardiovascular: No palpitations, sweating, lightheadedness, dizziness, syncope, presyncope, lower extremity swelling. Gastrointestinal: No nausea, vomiting, diarrhea, constipation, abdominal pain, hematemesis, melena, hematochezia  Genitourinary:No urinary complaints. Musculoskeletal:negative  Neurological: negative  Behvioral/Psych: negative  Endocrine: negative     Subjective:      Visit Vitals    /56 (BP 1 Location: Left arm, BP Patient Position: At rest)    Pulse (!) 56    Temp 97.9 °F (36.6 °C)    Resp 18    Ht 5' 6\" (1.676 m)    Wt 188 lb 7.9 oz (85.5 kg)    SpO2 98%    BMI 30.42 kg/m2       Physical Exam  Abdomen: soft, non-tender.  Bowel sounds normal.   Extremities: extremities normal, atraumatic, no cyanosis or edema  Heart: regular rate and rhythm, S1, S2 normal, no murmur, click, rub or gallop  Lungs: clear to auscultation bilaterally  Neck: supple, no carotid bruit and no JVD  Neurologic: Grossly normal  Pulses: 2+     Cardiographics    Telemetry: normal sinus rhythm    ECG: sinus rhythm, nonspecific ST and T waves changes    Echocardiogram: Not done    Labs:   Recent Results (from the past 24 hour(s))   D DIMER    Collection Time: 05/18/18  4:26 PM   Result Value Ref Range    D-dimer 0.46 0.00 - 0.65 mg/L FEU   NT-PRO BNP    Collection Time: 05/18/18  4:26 PM   Result Value Ref Range    NT pro- (H) 0 - 125 PG/ML   LACTIC ACID    Collection Time: 05/18/18  4:26 PM   Result Value Ref Range    Lactic acid 1.0 0.4 - 2.0 MMOL/L   URINALYSIS W/ REFLEX CULTURE    Collection Time: 05/18/18  5:52 PM   Result Value Ref Range    Color YELLOW/STRAW      Appearance CLEAR CLEAR      Specific gravity 1.012 1.003 - 1.030      pH (UA) 6.5 5.0 - 8.0      Protein NEGATIVE  NEG mg/dL    Glucose NEGATIVE  NEG mg/dL    Ketone NEGATIVE  NEG mg/dL    Bilirubin NEGATIVE  NEG      Blood NEGATIVE  NEG      Urobilinogen 1.0 0.2 - 1.0 EU/dL    Nitrites NEGATIVE  NEG      Leukocyte Esterase NEGATIVE  NEG      WBC 0-4 0 - 4 /hpf    RBC 0-5 0 - 5 /hpf    Epithelial cells FEW FEW /lpf    Bacteria NEGATIVE  NEG /hpf    UA:UC IF INDICATED CULTURE NOT INDICATED BY UA RESULT CNI     CK W/ CKMB & INDEX    Collection Time: 05/18/18 10:34 PM   Result Value Ref Range     39 - 308 U/L    CK - MB 1.3 <3.6 NG/ML    CK-MB Index 0.7 0 - 2.5     TROPONIN I    Collection Time: 05/18/18 10:34 PM   Result Value Ref Range    Troponin-I, Qt. <0.04 <7.09 ng/mL   METABOLIC PANEL, COMPREHENSIVE    Collection Time: 05/19/18  3:33 AM   Result Value Ref Range    Sodium 139 136 - 145 mmol/L    Potassium 4.2 3.5 - 5.1 mmol/L    Chloride 111 (H) 97 - 108 mmol/L    CO2 22 21 - 32 mmol/L    Anion gap 6 5 - 15 mmol/L    Glucose 110 (H) 65 - 100 mg/dL    BUN 31 (H) 6 - 20 MG/DL    Creatinine 2.13 (H) 0.70 - 1.30 MG/DL    BUN/Creatinine ratio 15 12 - 20      GFR est AA 37 (L) >60 ml/min/1.73m2    GFR est non-AA 31 (L) >60 ml/min/1.73m2    Calcium 9.1 8.5 - 10.1 MG/DL    Bilirubin, total 0.9 0.2 - 1.0 MG/DL    ALT (SGPT) 24 12 - 78 U/L    AST (SGOT) 16 15 - 37 U/L    Alk.  phosphatase 39 (L) 45 - 117 U/L    Protein, total 7.2 6.4 - 8.2 g/dL    Albumin 3.6 3.5 - 5.0 g/dL    Globulin 3.6 2.0 - 4.0 g/dL    A-G Ratio 1.0 (L) 1.1 - 2.2 CBC WITH AUTOMATED DIFF    Collection Time: 05/19/18  3:33 AM   Result Value Ref Range    WBC 7.6 4.1 - 11.1 K/uL    RBC 3.83 (L) 4.10 - 5.70 M/uL    HGB 13.0 12.1 - 17.0 g/dL    HCT 39.0 36.6 - 50.3 %    .8 (H) 80.0 - 99.0 FL    MCH 33.9 26.0 - 34.0 PG    MCHC 33.3 30.0 - 36.5 g/dL    RDW 14.3 11.5 - 14.5 %    PLATELET 424 855 - 343 K/uL    MPV 11.6 8.9 - 12.9 FL    NRBC 0.0 0  WBC    ABSOLUTE NRBC 0.00 0.00 - 0.01 K/uL    NEUTROPHILS 56 32 - 75 %    LYMPHOCYTES 29 12 - 49 %    MONOCYTES 12 5 - 13 %    EOSINOPHILS 3 0 - 7 %    BASOPHILS 0 0 - 1 %    IMMATURE GRANULOCYTES 0 0.0 - 0.5 %    ABS. NEUTROPHILS 4.2 1.8 - 8.0 K/UL    ABS. LYMPHOCYTES 2.2 0.8 - 3.5 K/UL    ABS. MONOCYTES 0.9 0.0 - 1.0 K/UL    ABS. EOSINOPHILS 0.2 0.0 - 0.4 K/UL    ABS. BASOPHILS 0.0 0.0 - 0.1 K/UL    ABS. IMM. GRANS. 0.0 0.00 - 0.04 K/UL    DF AUTOMATED     CK W/ CKMB & INDEX    Collection Time: 05/19/18  3:33 AM   Result Value Ref Range     39 - 308 U/L    CK - MB 1.6 <3.6 NG/ML    CK-MB Index 0.9 0 - 2.5     TROPONIN I    Collection Time: 05/19/18  3:33 AM   Result Value Ref Range    Troponin-I, Qt. <0.04 <0.05 ng/mL   LIPID PANEL    Collection Time: 05/19/18  3:33 AM   Result Value Ref Range    LIPID PROFILE          Cholesterol, total 150 <200 MG/DL    Triglyceride 174 (H) <150 MG/DL    HDL Cholesterol 31 MG/DL    LDL, calculated 84.2 0 - 100 MG/DL    VLDL, calculated 34.8 MG/DL    CHOL/HDL Ratio 4.8 0 - 5.0     SED RATE (ESR)    Collection Time: 05/19/18  3:33 AM   Result Value Ref Range    Sed rate, automated 11 0 - 20 mm/hr        Assessment:     Assessment:       Principal Problem:    Chest pain (5/18/2018)    Active Problems:    Hypertension (7/23/2017)      CKD (chronic kidney disease) stage 3, GFR 30-59 ml/min (7/23/2017)      Dyslipidemia (7/23/2017)      Pre-diabetes (7/23/2017)         Plan:     1. Chest pain: clinically chest pain appears to be due to pericarditis.  Very subtle EKG changes suggestive of pericarditis. F/u Echo results. -ve cardiac enzymes. Add colchicine. On aspirin. No NSAIDs due to renal insuff. Due to component of neck and jaw pain will get stress test as out pt at some point. 2. CKD: stable. Monitor. 3. HTN: BP controlled. Continue current meds.

## 2018-05-19 NOTE — H&P
Hospitalist Admission Note    NAME:  Loretta Quijano. :   1950   MRN:   [id number]     Date of admit: 2018    PCP: Chen Yip MD    Assessment/Plan:     Chest pain (2018) POA etiology unclear  Pleuritic and positional(worse with laying down, better sitting up)  Some tenderness along costochondral junction  EKG and serial cardiac enzymes  Differential:      CAD serial enzymes negative    Pericarditis    Costochondritis tender along sternal border    PE less likely as LE dopplers negative and V/Q low prob    GERD  Serial enzymes  Check Echo  ASA and atenolol and lisinopril  Cardiology consult  NSAIDs if pericarditis likely  Dr Whit Mercado to assume care in AM    Essential HTN POA  Lisinopril, atenolol  PRN hydralazine    Stage 3 Chronic kidney disease POA creat 2.54  Suspect he close to his baseline, creatinine was 2.1 6 years ago  Serial labs  Watch on the lisinopril    Gout POA renally dosed allopurinol    History of prostatitis  Nephrolithiasis POA stable    Obesity POA body mass index is 30.42 kg/(m^2). Given the patient's current clinical presentation, I have a high level of concern for decompensation if discharged from the emergency department. My assessment of this patient's clinical condition and my plan of care is as noted above. DVT prophylaxis with lovenox    Code status: full code  NOK:    History     CHIEF COMPLAINT: \"I started having CP today\"    HISTORY OF PRESENT ILLNESS:  77 Y. O WM  No prior history of CAD or MI, has never seen a cardiologist  Awoke this Am with SSCP, pressure of knot in chest, mild to moderate  Persistent though most of the day, initially progressively worsening, prompted him to come to ED  Now easing off and 80% better  Worse when active  Worse with taking a deep breath, hard to get a deep breath at times  Worse laying down and better sitting up  Radiated to neck and lower jaw  Mild nausea, no vomiting  No diaphoresis  Not frankly SOB  No abdominal pain, diarrhea    ED mildly tender along costocondral junction  No distress  No ischemic EKG changes  Serial enzymes negative  LE dopplers with no DVT, V/Q scan low prob for PE  We were called to admit the patient    Past Medical History:   Diagnosis Date    Finding of rib structure     right lower rib cartilage weak    Hypertension     Other ill-defined conditions(799.89)     gout    Unspecified adverse effect of anesthesia     lays on back,chokes on drainage    Unspecified sleep apnea     septum surgery to fix, still an issue        Past Surgical History:   Procedure Laterality Date    HX KNEE ARTHROSCOPY      HX OTHER SURGICAL      kidney stones       Social History   Substance Use Topics    Smoking status: Never Smoker    Smokeless tobacco: Never Used    Alcohol use No    lives with wife    Family History   Problem Relation Age of Onset    Diabetes Mother     Hypertension Father     Father had leaky valve  Denies family history of premature CAD  2 children healthy    No Known Allergies     Prior to Admission medications    Medication Sig Start Date End Date Taking? Authorizing Provider   allopurinol (ZYLOPRIM) 300 mg tablet Take 225 mg by mouth daily (with lunch). Yes Elian White MD   atenolol (TENORMIN) 25 mg tablet Take 12.5 mg by mouth daily (with lunch). Yes Elian White MD   fenofibrate (LOFIBRA) 160 mg tablet Take 160 mg by mouth daily (with lunch). Yes Elian White MD   Potassium Citrate (UROCIT-K) TbER tablet Take 15 mEq by mouth daily (with lunch). Yes Elian White MD   vitamin E (AQUA GEMS) 400 unit capsule Take 400 Units by mouth daily (with lunch). Yes Elian White MD   omega 3-dha-epa-fish oil (FISH OIL) 100-160-1,000 mg cap Take 1,000 mg by mouth daily (with lunch). Yes Elian White MD   cholecalciferol (VITAMIN D3) 1,000 unit tablet Take 500 Units by mouth two (2) times a day.    Yes Elian White MD   ascorbic acid, vitamin C, (VITAMIN C) 500 mg tablet Take 500 mg by mouth daily (with lunch). Yes Phys Other, MD   lisinopril (PRINIVIL, ZESTRIL) 5 mg tablet Take 5 mg by mouth daily (with lunch). Yes Historical Provider   aspirin (ASPIRIN) 325 mg tablet Take 325 mg by mouth nightly.    Yes Elian Other, MD       Review of symptoms:     POSITIVE= Bold  Negative = not bold  General:  fever, chills, sweats  Eyes:    blurred vision, eye pain, double vision  ENT:    Coryza, sore throat, trouble swallowing  Respiratory:   cough, sputum, SOB  Cardiology:   chest pain, orthopnea, PND, edema  Gastrointestinal:  abdominal pain, mild Nausea, diarrhea, constipation, melena or BRBPR  Genitourinary:  Urgency, dysuria, hematuria  Muskuloskeletal :  Joint redness, swelling or acute joint pain, myalgias  Hematology:  easy bruising, nose or gum bleeding  Dermatological: rash, ulceration  Endocrine:   Polyuria or polydipsia, heat or hold intolerance  Neurological:  Headache, focal motor or sensory changes     Speech difficulties, memory loss  Psychological: depression, agitation      Objective:   VITALS:    Patient Vitals for the past 24 hrs:   Temp Pulse Resp BP SpO2   05/18/18 2318 98.7 °F (37.1 °C) 68 16 138/76 100 %   05/18/18 2249 - 61 15 - 96 %   05/18/18 2245 - 61 16 129/72 93 %   05/18/18 2230 - 63 16 129/59 94 %   05/18/18 2215 - 65 15 134/63 95 %   05/18/18 2211 - 65 19 128/65 96 %   05/18/18 2203 - 67 14 - 97 %   05/18/18 2130 - 65 21 156/73 94 %   05/18/18 2115 - 66 18 124/83 95 %   05/18/18 2057 - 62 15 - 96 %   05/18/18 2051 - (!) 59 14 - 95 %   05/18/18 2045 - 62 14 128/68 94 %   05/18/18 2034 - 62 14 122/60 97 %   05/18/18 2005 - 61 15 - 93 %   05/18/18 2000 - 64 14 131/67 95 %   05/18/18 1945 - 63 17 137/68 93 %   05/18/18 1930 - 67 19 134/75 94 %   05/18/18 1915 - 67 15 135/73 96 %   05/18/18 1900 - 66 15 127/78 97 %   05/18/18 1845 - 66 15 139/68 96 %   05/18/18 1839 - 65 18 - 96 %   05/18/18 1830 - 66 22 140/77 97 %   05/18/18 1816 - 70 21 140/71 96 %   05/18/18 1800 - 69 19 103/52 98 %   18 1745 - 67 17 128/75 97 %   18 1730 - 65 17 133/72 97 %   18 1715 - 66 13 124/64 98 %   18 1700 - 69 19 140/76 99 %   18 1645 - 67 11 141/78 97 %   18 1630 - 69 19 140/76 99 %   18 1615 - 68 17 143/73 99 %   18 1600 - 70 19 142/74 99 %   18 1555 99 °F (37.2 °C) - - - -   18 1548 - - - 129/72 -   18 1302 98.5 °F (36.9 °C) 80 14 157/81 98 %     Temp (24hrs), Av.7 °F (37.1 °C), Min:98.5 °F (36.9 °C), Max:99 °F (37.2 °C)      O2 Device: Room air    PHYSICAL EXAM:   General:    Alert, cooperative in no distress     HEENT: Normocephalic, atraumatic    PERRL, EOMI  Sclera no icterus    Nasal mucosa without masses or discharge  Hearing intact to voice    Oropharynx without erythema or exudate  Pink MM  Neck:  No meningismus, trachea midline, no carotid bruits     Thyroid not enlarged, no nodules or tenderness  Chest wall: Tender alone costochondral junction  Lungs:   Clear to auscultation bilaterally. No wheezing or rales    No accessory muscle use or retractions. Heart:   Regular rate and rhythm,  no murmur or gallop. No LE edema  Abdomen:   Soft, non-tender. Not distended. Bowel sounds normal.     No masses, No Hepatosplenomegaly, No Rebound or guarding  Lymph nodes: No cervical or inguinal CADENCE  Musculoskeletal:  No Joint swelling, erythema, warmth.  No Cyanosis or clubbing  Skin:      No rashes     Not Jaundiced   No nodules or thickening  Neurologic: Alert and oriented X 3, follows commands     Cranial nerves 2 to 12 intact    Symmetric motor strength bilaterally       LAB DATA REVIEWED:    Recent Results (from the past 12 hour(s))   EKG, 12 LEAD, INITIAL    Collection Time: 18 12:52 PM   Result Value Ref Range    Ventricular Rate 81 BPM    Atrial Rate 81 BPM    P-R Interval 156 ms    QRS Duration 90 ms    Q-T Interval 362 ms    QTC Calculation (Bezet) 420 ms    Calculated P Axis 53 degrees    Calculated R Axis -3 degrees Calculated T Axis 27 degrees    Diagnosis       Normal sinus rhythm  Loss of anteroseptal forces  Nonspecific ST and T wave abnormality    When compared with ECG of 12-MAY-2010 09:44,  No significant change was found    Confirmed by Samson Ramsey ([id number]) on 5/18/2018 5:52:21 PM     CBC WITH AUTOMATED DIFF    Collection Time: 05/18/18  1:26 PM   Result Value Ref Range    WBC 10.2 4.1 - 11.1 K/uL    RBC 4.06 (L) 4.10 - 5.70 M/uL    HGB 13.6 12.1 - 17.0 g/dL    HCT 41.2 36.6 - 50.3 %    .5 (H) 80.0 - 99.0 FL    MCH 33.5 26.0 - 34.0 PG    MCHC 33.0 30.0 - 36.5 g/dL    RDW 14.0 11.5 - 14.5 %    PLATELET 286 085 - 563 K/uL    MPV 11.1 8.9 - 12.9 FL    NRBC 0.0 0  WBC    ABSOLUTE NRBC 0.00 0.00 - 0.01 K/uL    NEUTROPHILS 81 (H) 32 - 75 %    LYMPHOCYTES 10 (L) 12 - 49 %    MONOCYTES 7 5 - 13 %    EOSINOPHILS 1 0 - 7 %    BASOPHILS 0 0 - 1 %    IMMATURE GRANULOCYTES 0 0.0 - 0.5 %    ABS. NEUTROPHILS 8.3 (H) 1.8 - 8.0 K/UL    ABS. LYMPHOCYTES 1.0 0.8 - 3.5 K/UL    ABS. MONOCYTES 0.8 0.0 - 1.0 K/UL    ABS. EOSINOPHILS 0.1 0.0 - 0.4 K/UL    ABS. BASOPHILS 0.0 0.0 - 0.1 K/UL    ABS. IMM. GRANS. 0.0 0.00 - 0.04 K/UL    DF AUTOMATED     METABOLIC PANEL, COMPREHENSIVE    Collection Time: 05/18/18  1:26 PM   Result Value Ref Range    Sodium 139 136 - 145 mmol/L    Potassium 4.5 3.5 - 5.1 mmol/L    Chloride 108 97 - 108 mmol/L    CO2 23 21 - 32 mmol/L    Anion gap 8 5 - 15 mmol/L    Glucose 151 (H) 65 - 100 mg/dL    BUN 32 (H) 6 - 20 MG/DL    Creatinine 2.54 (H) 0.70 - 1.30 MG/DL    BUN/Creatinine ratio 13 12 - 20      GFR est AA 31 (L) >60 ml/min/1.73m2    GFR est non-AA 25 (L) >60 ml/min/1.73m2    Calcium 8.9 8.5 - 10.1 MG/DL    Bilirubin, total 0.6 0.2 - 1.0 MG/DL    ALT (SGPT) 27 12 - 78 U/L    AST (SGOT) 19 15 - 37 U/L    Alk.  phosphatase 45 45 - 117 U/L    Protein, total 7.5 6.4 - 8.2 g/dL    Albumin 3.8 3.5 - 5.0 g/dL    Globulin 3.7 2.0 - 4.0 g/dL    A-G Ratio 1.0 (L) 1.1 - 2.2     CK W/ REFLX CKMB    Collection Time: 05/18/18  1:26 PM   Result Value Ref Range     39 - 308 U/L   TROPONIN I    Collection Time: 05/18/18  1:26 PM   Result Value Ref Range    Troponin-I, Qt. <0.04 <0.05 ng/mL   D DIMER    Collection Time: 05/18/18  4:26 PM   Result Value Ref Range    D-dimer 0.46 0.00 - 0.65 mg/L FEU   NT-PRO BNP    Collection Time: 05/18/18  4:26 PM   Result Value Ref Range    NT pro- (H) 0 - 125 PG/ML   LACTIC ACID    Collection Time: 05/18/18  4:26 PM   Result Value Ref Range    Lactic acid 1.0 0.4 - 2.0 MMOL/L   URINALYSIS W/ REFLEX CULTURE    Collection Time: 05/18/18  5:52 PM   Result Value Ref Range    Color YELLOW/STRAW      Appearance CLEAR CLEAR      Specific gravity 1.012 1.003 - 1.030      pH (UA) 6.5 5.0 - 8.0      Protein NEGATIVE  NEG mg/dL    Glucose NEGATIVE  NEG mg/dL    Ketone NEGATIVE  NEG mg/dL    Bilirubin NEGATIVE  NEG      Blood NEGATIVE  NEG      Urobilinogen 1.0 0.2 - 1.0 EU/dL    Nitrites NEGATIVE  NEG      Leukocyte Esterase NEGATIVE  NEG      WBC 0-4 0 - 4 /hpf    RBC 0-5 0 - 5 /hpf    Epithelial cells FEW FEW /lpf    Bacteria NEGATIVE  NEG /hpf    UA:UC IF INDICATED CULTURE NOT INDICATED BY UA RESULT CNI     CK W/ CKMB & INDEX    Collection Time: 05/18/18 10:34 PM   Result Value Ref Range     39 - 308 U/L    CK - MB 1.3 <3.6 NG/ML    CK-MB Index 0.7 0 - 2.5     TROPONIN I    Collection Time: 05/18/18 10:34 PM   Result Value Ref Range    Troponin-I, Qt. <0.04 <0.05 ng/mL       EKG as read by me shows NSR rate 81, LAD, no LVH  Non specific ST-T changes    CXR read by radiology and reviewed by myself shows FINDINGS:   PA and lateral radiographs of the chest were obtained. Lungs: The lungs are clear of mass, nodule, airspace disease or edema. Pleura: There is no pleural effusion or pneumothorax. Mediastinum: The cardiac and mediastinal contours and pulmonary vascularity are  normal.  The aorta is atherosclerotic. Bones and soft tissues:  There are mild degenerative changes of the spine.  IMPRESSION: No acute abnormality and no change. LE dopplers results:  Duplex venous Doppler examination was performed from the bilateral inguinal  ligament to the mid-calf. Deep venous structures were well imaged and appeared  compressible throughout. Both wave form and color flow analysis demonstrated  normal flow patterns. Augmentation was demonstrable. IMPRESSION:  NORMAL DUPLEX VENOUS DOPPLER EXAMINATION. NM V/Q scan results:   VQ scan performed with 4 mCi of technetium 99m MAA IV and 10 mCi of xenon-133  gas via mouth.  equilibrium. There is no significant perfusion or ventilation  abnormality. IMPRESSION: Low probability for pulmonary embolism. I saw the patient personally, took a history and did a complete physical exam at the bedside. I performed complex decision making in coming up with a diagnostic and treatment plan for the patient. I reviewed the patient's past medical records, current laboratory and radiology results, and actual Xray films/EKG. I have also discussed this case with the involved ED physician.     Care Plan discussed with:    Patient, ED Doc    Risk of deterioration:  High    Total Time Coordinating Admission:    60 minutes    Total Critical Care Time:         Rasheeda Andino MD

## 2018-05-19 NOTE — PROGRESS NOTES
physical Therapy EVALUATION/DISCHARGE  Patient: Pramod Foote. (67 y.o. male)  Date: 5/19/2018  Primary Diagnosis: Chest pain        Precautions:      ASSESSMENT :  Based on the objective data described below, the patient presents with baseline functional mobility, including strength, balance, ROM, and gait. Pt received sitting in bedside chair and agreeable to PT evaluation. Pt cleared by nursing for mobility. Pt reported 0.5/10 chest pain pre- and post-activity. VSS on RA. All functional mobility performed at independent level, including gait training and transfers. He was able to ambulate 175 ft independently. No overt LOB noted. He ascended/descended 10 steps without railing and with supervision. VSS post-activity. Pt was left sitting in bedside chair with all needs met and RN aware following therapy session. Pt is currently at his functional baseline and without acute PT needs. Will sign off. Recommend patient return home with family support at discharge. Skilled physical therapy is not indicated at this time. PLAN :  Discharge Recommendations: None  Further Equipment Recommendations for Discharge: None     SUBJECTIVE:   Patient stated I was alone when the pain started.     OBJECTIVE DATA SUMMARY:   HISTORY:    Past Medical History:   Diagnosis Date    Finding of rib structure     right lower rib cartilage weak    Hypertension     Other ill-defined conditions(489.89)     gout    Unspecified adverse effect of anesthesia     lays on back,chokes on drainage    Unspecified sleep apnea     septum surgery to fix, still an issue     Past Surgical History:   Procedure Laterality Date    HX KNEE ARTHROSCOPY      HX OTHER SURGICAL      kidney stones     Prior Level of Function/Home Situation: Pt is independent for mobility and ADLs at baseline. Lives with wife. Retired. Drives. Very active (walks daily). Denies fall history.   Personal factors and/or comorbidities impacting plan of care: gout    Home Situation  Home Environment: Private residence  # Steps to Enter: 5  Rails to Enter: Yes  One/Two Story Residence: Other (Comment) (tri-level)  Living Alone: No  Support Systems: Family member(s), Friends \ neighbors  Patient Expects to be Discharged to[de-identified] Private residence  Current DME Used/Available at Home: None    EXAMINATION/PRESENTATION/DECISION MAKING:   Critical Behavior:  Neurologic State: Alert     Cognition: Appropriate decision making, Appropriate for age attention/concentration, Appropriate safety awareness     Hearing: Auditory  Auditory Impairment: None  Skin:  Intact  Edema: None  Range Of Motion:  AROM: Within functional limits           PROM: Within functional limits           Strength:    Strength: Within functional limits                    Tone & Sensation:   Tone: Normal              Sensation: Intact               Coordination:  Coordination: Within functional limits  Vision:      Functional Mobility:  Bed Mobility:           Scooting: Independent  Transfers:  Sit to Stand: Independent  Stand to Sit: Independent                       Balance:   Sitting: Intact  Standing: Intact  Ambulation/Gait Training:  Distance (ft): 175 Feet (ft)  Assistive Device: Gait belt  Ambulation - Level of Assistance: Independent     Gait Description (WDL): Exceptions to WDL  Gait Abnormalities: Trunk sway increased     Stairs:  Number of Stairs Trained: 10  Stairs - Level of Assistance: Supervision   Rail Use: None      Functional Measure:  Barthel Index:    Bathin  Bladder: 10  Bowels: 10  Groomin  Dressing: 10  Feeding: 10  Mobility: 15  Stairs: 10  Toilet Use: 10  Transfer (Bed to Chair and Back): 15  Total: 100       Barthel and G-code impairment scale:  Percentage of impairment CH  0% CI  1-19% CJ  20-39% CK  40-59% CL  60-79% CM  80-99% CN  100%   Barthel Score 0-100 100 99-80 79-60 59-40 20-39 1-19   0   Barthel Score 0-20 20 17-19 13-16 9-12 5-8 1-4 0      The Barthel ADL Index: Guidelines  1.  The index should be used as a record of what a patient does, not as a record of what a patient could do. 2. The main aim is to establish degree of independence from any help, physical or verbal, however minor and for whatever reason. 3. The need for supervision renders the patient not independent. 4. A patient's performance should be established using the best available evidence. Asking the patient, friends/relatives and nurses are the usual sources, but direct observation and common sense are also important. However direct testing is not needed. 5. Usually the patient's performance over the preceding 24-48 hours is important, but occasionally longer periods will be relevant. 6. Middle categories imply that the patient supplies over 50 per cent of the effort. 7. Use of aids to be independent is allowed. Nasim Parisi., Barthel, DSHEYLA. (1816). Functional evaluation: the Barthel Index. [address] [facility] (14)2. Gaile Rubinstein judd LEONIDAS Wolf, Yaritza Edgar., Lidia Solomon., [place], [address] [place] [address] (1999). Measuring the change indisability after inpatient rehabilitation; comparison of the responsiveness of the Barthel Index and Functional [place] Measure. Journal of Neurology, Neurosurgery, and Psychiatry, 66(4), 902-141. Salima Lane, N.J.A, ANJELICA Butcher, & Rimma Dodson M.A. (2004.) Assessment of post-stroke quality of life in cost-effectiveness studies: The usefulness of the Barthel Index and the EuroQoL-5D. Quality of Life Research, 13, 123-47       G codes: In compliance with CMSs Claims Based Outcome Reporting, the following G-code set was chosen for this patient based on their primary functional limitation being treated: The outcome measure chosen to determine the severity of the functional limitation was the Barthel Index with a score of 100/100 which was correlated with the impairment scale.     ? Mobility - Walking and Moving Around:     - CURRENT STATUS: CH - 0% impaired, limited or restricted    - GOAL STATUS: CH - 0% impaired, limited or restricted    - D/C STATUS:  CH - 0% impaired, limited or restricted        Physical Therapy Evaluation Charge Determination   History Examination Presentation Decision-Making   MEDIUM  Complexity : 1-2 comorbidities / personal factors will impact the outcome/ POC  HIGH Complexity : 4+ Standardized tests and measures addressing body structure, function, activity limitation and / or participation in recreation  LOW Complexity : Stable, uncomplicated  Other outcome measures Barthel Index  LOW       Based on the above components, the patient evaluation is determined to be of the following complexity level: LOW     Pain:  Pain Scale 1: Numeric (0 - 10)  Pain Intensity 1: 0     Activity Tolerance:   Good - pain 0.5/10; VSS  Please refer to the flowsheet for vital signs taken during this treatment. After treatment:   [x]   Patient left in no apparent distress sitting up in chair  []   Patient left in no apparent distress in bed  [x]   Call bell left within reach  [x]   Nursing notified  []   Caregiver present  []   Bed alarm activated    COMMUNICATION/EDUCATION:   Communication/Collaboration:  [x]   Fall prevention education was provided and the patient/caregiver indicated understanding. [x]   Patient/family have participated as able and agree with findings and recommendations. []   Patient is unable to participate in plan of care at this time.   Findings and recommendations were discussed with: Physical Therapist and Registered Nurse    Thank you for this referral.  Mortimer Sheerer, PT, DPT   Time Calculation: 8 mins

## 2018-05-19 NOTE — PROGRESS NOTES
1900 Received report from [facility]. Assumed patient care. Bedside shift change report given to Chloe Alfaro RN (oncoming nurse) by Sylvia Licea RN (offgoing nurse). Report included the following information SBAR, Kardex, Intake/Output, MAR, Recent Results and Cardiac Rhythm NSR/SB.

## 2018-05-19 NOTE — ED NOTES
TRANSFER - OUT REPORT:    Verbal report given to Ewelina RANDALL(name) on [facility].  being transferred to [facility] Cely Ayoub (unit) for routine progression of care       Report consisted of patients Situation, Background, Assessment and   Recommendations(SBAR). Information from the following report(s) SBAR, Kardex, ED Summary and MAR was reviewed with the receiving nurse. Lines:   Peripheral IV 05/18/18 Right Antecubital (Active)   Site Assessment Clean, dry, & intact 5/18/2018  4:25 PM   Phlebitis Assessment 0 5/18/2018  4:25 PM   Infiltration Assessment 0 5/18/2018  4:25 PM   Dressing Status Clean, dry, & intact 5/18/2018  4:25 PM        Opportunity for questions and clarification was provided. Patient to be transported with:  Pt belongings/chart/labels.

## 2018-05-20 VITALS
WEIGHT: 188.49 LBS | DIASTOLIC BLOOD PRESSURE: 61 MMHG | SYSTOLIC BLOOD PRESSURE: 111 MMHG | HEIGHT: 66 IN | BODY MASS INDEX: 30.29 KG/M2 | OXYGEN SATURATION: 97 % | RESPIRATION RATE: 14 BRPM | TEMPERATURE: 98 F | HEART RATE: 56 BPM

## 2018-05-20 LAB
ANION GAP SERPL CALC-SCNC: 8 MMOL/L (ref 5–15)
BUN SERPL-MCNC: 34 MG/DL (ref 6–20)
BUN/CREAT SERPL: 15 (ref 12–20)
CALCIUM SERPL-MCNC: 9.5 MG/DL (ref 8.5–10.1)
CHLORIDE SERPL-SCNC: 110 MMOL/L (ref 97–108)
CO2 SERPL-SCNC: 22 MMOL/L (ref 21–32)
CREAT SERPL-MCNC: 2.25 MG/DL (ref 0.7–1.3)
ERYTHROCYTE [DISTWIDTH] IN BLOOD BY AUTOMATED COUNT: 14.1 % (ref 11.5–14.5)
GLUCOSE SERPL-MCNC: 116 MG/DL (ref 65–100)
HCT VFR BLD AUTO: 41.8 % (ref 36.6–50.3)
HGB BLD-MCNC: 13.8 G/DL (ref 12.1–17)
MCH RBC QN AUTO: 33.4 PG (ref 26–34)
MCHC RBC AUTO-ENTMCNC: 33 G/DL (ref 30–36.5)
MCV RBC AUTO: 101.2 FL (ref 80–99)
NRBC # BLD: 0 K/UL (ref 0–0.01)
NRBC BLD-RTO: 0 PER 100 WBC
PLATELET # BLD AUTO: 165 K/UL (ref 150–400)
PMV BLD AUTO: 11.2 FL (ref 8.9–12.9)
POTASSIUM SERPL-SCNC: 4.2 MMOL/L (ref 3.5–5.1)
RBC # BLD AUTO: 4.13 M/UL (ref 4.1–5.7)
SODIUM SERPL-SCNC: 140 MMOL/L (ref 136–145)
WBC # BLD AUTO: 5.8 K/UL (ref 4.1–11.1)

## 2018-05-20 PROCEDURE — 74011250636 HC RX REV CODE- 250/636: Performed by: INTERNAL MEDICINE

## 2018-05-20 PROCEDURE — 93306 TTE W/DOPPLER COMPLETE: CPT

## 2018-05-20 PROCEDURE — 74011250637 HC RX REV CODE- 250/637: Performed by: INTERNAL MEDICINE

## 2018-05-20 PROCEDURE — 85027 COMPLETE CBC AUTOMATED: CPT | Performed by: INTERNAL MEDICINE

## 2018-05-20 PROCEDURE — 99218 HC RM OBSERVATION: CPT

## 2018-05-20 PROCEDURE — 80048 BASIC METABOLIC PNL TOTAL CA: CPT | Performed by: INTERNAL MEDICINE

## 2018-05-20 PROCEDURE — 36415 COLL VENOUS BLD VENIPUNCTURE: CPT | Performed by: INTERNAL MEDICINE

## 2018-05-20 RX ORDER — COLCHICINE 0.6 MG/1
0.6 TABLET ORAL 2 TIMES DAILY
Qty: 60 TAB | Refills: 0 | Status: SHIPPED | OUTPATIENT
Start: 2018-05-20 | End: 2018-08-03 | Stop reason: SDUPTHER

## 2018-05-20 RX ORDER — ALLOPURINOL 100 MG/1
200 TABLET ORAL DAILY
Qty: 60 TAB | Refills: 6 | Status: SHIPPED | OUTPATIENT
Start: 2018-05-20 | End: 2018-07-23 | Stop reason: SDUPTHER

## 2018-05-20 RX ORDER — LISINOPRIL 5 MG/1
2.5 TABLET ORAL DAILY
Status: DISCONTINUED | OUTPATIENT
Start: 2018-05-20 | End: 2018-05-20 | Stop reason: HOSPADM

## 2018-05-20 RX ORDER — LISINOPRIL 2.5 MG/1
2.5 TABLET ORAL DAILY
Qty: 30 TAB | Refills: 0 | Status: SHIPPED | OUTPATIENT
Start: 2018-05-21 | End: 2018-07-23 | Stop reason: SDUPTHER

## 2018-05-20 RX ADMIN — FENOFIBRATE 145 MG: 145 TABLET ORAL at 10:33

## 2018-05-20 RX ADMIN — COLCHICINE 0.6 MG: 0.6 TABLET, FILM COATED ORAL at 10:33

## 2018-05-20 RX ADMIN — OXYCODONE HYDROCHLORIDE AND ACETAMINOPHEN 500 MG: 500 TABLET ORAL at 12:12

## 2018-05-20 RX ADMIN — Medication 10 ML: at 14:00

## 2018-05-20 RX ADMIN — ATENOLOL 12.5 MG: 25 TABLET ORAL at 12:12

## 2018-05-20 RX ADMIN — ALLOPURINOL 200 MG: 100 TABLET ORAL at 12:12

## 2018-05-20 RX ADMIN — Medication 10 ML: at 05:20

## 2018-05-20 RX ADMIN — LISINOPRIL 2.5 MG: 5 TABLET ORAL at 12:11

## 2018-05-20 NOTE — PROGRESS NOTES
0700: Bedside shift change report given to Priyanka Espinoza RN (oncoming nurse) by Liza Reese RN (offgoing nurse). Report included the following information SBAR, Kardex, ED Summary, Intake/Output, MAR and Recent Results. 1150: Spoke with Dr. Todd Navas, pt is cleared from a cardiology standpoint for discharge.  Will follow up with Dr. Shawn Peacock

## 2018-05-20 NOTE — DISCHARGE INSTRUCTIONS
[address]  [phone]      Patient Discharge Instructions    Heather Velasquez. / [id number] : 1950    Admitted 2018 Discharged: 2018     Take Home Medications        · It is important that you take the medication exactly as they are prescribed. · Keep your medication in the bottles provided by the pharmacist and keep a list of the medication names, dosages, and times to be taken in your wallet. · Do not take other medications without consulting your doctor. What to do at Home    Recommended diet: Cardiac Diet,     Recommended activity: Activity as tolerated,     Follow-up with Dr Carol Powell and cardiologist in 1 week        Information obtained by :  I understand that if any problems occur once I am at home I am to contact my physician. I understand and acknowledge receipt of the instructions indicated above.                                                                                                                                            Physician's or R.N.'s Signature                                                                  Date/Time                                                                                                                                              Patient or Representative Signature                                                          Date/Time

## 2018-05-20 NOTE — PROGRESS NOTES
5/20/2018 9:09 AM    Admit Date: 5/18/2018    Admit Diagnosis: Chest pain    Subjective:     Toni Adamson.   denies chest pain, chest pressure/discomfort, dyspnea, palpitations, irregular heart beats, near-syncope, orthopnea, paroxysmal nocturnal dyspnea. Visit Vitals    /59 (BP 1 Location: Left arm, BP Patient Position: Sitting)    Pulse (!) 56  Comment: notified Vanessa,RN    Temp 98.1 °F (36.7 °C)    Resp 18    Ht 5' 6\" (1.676 m)    Wt 188 lb 7.9 oz (85.5 kg)    SpO2 98%    BMI 30.42 kg/m2     Current Facility-Administered Medications   Medication Dose Route Frequency    lisinopril (PRINIVIL, ZESTRIL) tablet 2.5 mg  2.5 mg Oral DAILY    colchicine tablet 0.6 mg  0.6 mg Oral BID    sodium chloride (NS) flush 5-10 mL  5-10 mL IntraVENous Q8H    sodium chloride (NS) flush 5-10 mL  5-10 mL IntraVENous PRN    acetaminophen (TYLENOL) tablet 650 mg  650 mg Oral Q6H PRN    oxyCODONE-acetaminophen (PERCOCET) 5-325 mg per tablet 1 Tab  1 Tab Oral Q6H PRN    naloxone (NARCAN) injection 0.4 mg  0.4 mg IntraVENous PRN    ondansetron (ZOFRAN) injection 4 mg  4 mg IntraVENous Q4H PRN    bisacodyl (DULCOLAX) suppository 10 mg  10 mg Rectal DAILY PRN    enoxaparin (LOVENOX) injection 30 mg  30 mg SubCUTAneous Q24H    atenolol (TENORMIN) tablet 12.5 mg  12.5 mg Oral DAILY WITH LUNCH    aspirin (ASPIRIN) tablet 325 mg  325 mg Oral QHS    fenofibrate nanocrystallized (TRICOR) tablet 145 mg  145 mg Oral DAILY    ascorbic acid (vitamin C) (VITAMIN C) tablet 500 mg  500 mg Oral DAILY WITH LUNCH    allopurinol (ZYLOPRIM) tablet 200 mg  200 mg Oral DAILY WITH LUNCH         Objective:      Visit Vitals    /59 (BP 1 Location: Left arm, BP Patient Position: Sitting)    Pulse (!) 56    Temp 98.1 °F (36.7 °C)    Resp 18    Ht 5' 6\" (1.676 m)    Wt 188 lb 7.9 oz (85.5 kg)    SpO2 98%    BMI 30.42 kg/m2       Physical Exam:  Abdomen: soft, non-tender.  Bowel sounds normal. Extremities: no cyanosis or edema  Heart: regular rate and rhythm, S1, S2 normal, no murmur, click, rub or gallop  Lungs: clear to auscultation bilaterally  Neurologic: Grossly normal    Data Review:   Labs:    Recent Results (from the past 24 hour(s))   CBC W/O DIFF    Collection Time: 05/20/18  5:01 AM   Result Value Ref Range    WBC 5.8 4.1 - 11.1 K/uL    RBC 4.13 4.10 - 5.70 M/uL    HGB 13.8 12.1 - 17.0 g/dL    HCT 41.8 36.6 - 50.3 %    .2 (H) 80.0 - 99.0 FL    MCH 33.4 26.0 - 34.0 PG    MCHC 33.0 30.0 - 36.5 g/dL    RDW 14.1 11.5 - 14.5 %    PLATELET 946 741 - 413 K/uL    MPV 11.2 8.9 - 12.9 FL    NRBC 0.0 0  WBC    ABSOLUTE NRBC 0.00 0.00 - 6.69 K/uL   METABOLIC PANEL, BASIC    Collection Time: 05/20/18  5:01 AM   Result Value Ref Range    Sodium 140 136 - 145 mmol/L    Potassium 4.2 3.5 - 5.1 mmol/L    Chloride 110 (H) 97 - 108 mmol/L    CO2 22 21 - 32 mmol/L    Anion gap 8 5 - 15 mmol/L    Glucose 116 (H) 65 - 100 mg/dL    BUN 34 (H) 6 - 20 MG/DL    Creatinine 2.25 (H) 0.70 - 1.30 MG/DL    BUN/Creatinine ratio 15 12 - 20      GFR est AA 35 (L) >60 ml/min/1.73m2    GFR est non-AA 29 (L) >60 ml/min/1.73m2    Calcium 9.5 8.5 - 10.1 MG/DL       Telemetry: normal sinus rhythm      Assessment:     Principal Problem:    Chest pain (5/18/2018)    Active Problems:    Hypertension (7/23/2017)      CKD (chronic kidney disease) stage 3, GFR 30-59 ml/min (7/23/2017)      Dyslipidemia (7/23/2017)      Pre-diabetes (7/23/2017)        Plan:     1. Pericarditis. Continue colchicine for 3 months. Echo today. On aspirin. No NSAIDs due to renal insuff. Now pain free. Due to component of neck and jaw pain will get stress test as out pt at some point. 2. CKD: slightly elevated Cr. Lower lisinopril to 2.5 mg. May have to discontinue if Cr. Continues to rise. 3. HTN: BP controlled.

## 2018-05-20 NOTE — DISCHARGE SUMMARY
[facility]  [address]  [phone]    Hospital Discharge Summary        Patient ID:  Loretta Quijano  [id number]  77 y.o.  1950    Admit date: 5/18/2018  Discharge date and time: 5/20/2018    Admission Diagnoses: Chest pain     Discharge Diagnoses:     Principal Problem:    Chest pain (5/18/2018)    Active Problems:    Hypertension (7/23/2017)      CKD (chronic kidney disease) stage 3, GFR 30-59 ml/min (7/23/2017)      Dyslipidemia (7/23/2017)      Pre-diabetes (7/23/2017)         Past Medical History:   Diagnosis Date    Finding of rib structure     right lower rib cartilage weak    Hypertension     Other ill-defined conditions(179.57)     gout    Unspecified adverse effect of anesthesia     lays on back,chokes on drainage    Unspecified sleep apnea     septum surgery to fix, still an issue         PCP: Chen Yip MD    Consults: Cardiology      History of Present Illness: Per hospitalist -   77 Y. O WM  No prior history of CAD or MI, has never seen a cardiologist  Awoke this Am with SSCP, pressure of knot in chest, mild to moderate  Persistent though most of the day, initially progressively worsening, prompted him to come to ED  Now easing off and 80% better  Worse when active  Worse with taking a deep breath, hard to get a deep breath at times  Worse laying down and better sitting up  Radiated to neck and lower jaw  Mild nausea, no vomiting  No diaphoresis  Not frankly SOB  No abdominal pain, diarrhea     ED mildly tender along costocondral junction  No distress  No ischemic EKG changes  Serial enzymes negative  LE dopplers with no DVT, V/Q scan low prob for PE  We were called to admit the patient    Admission Physical Exam: Per hospitalist -   VITALS:    Patient Vitals for the past 24 hrs:    Temp Pulse Resp BP SpO2   05/18/18 2318 98.7 °F (37.1 °C) 68 16 138/76 100 %   05/18/18 2249 - 61 15 - 96 %   05/18/18 2245 - 61 16 129/72 93 %   05/18/18 2230 16 129/59 94 %   05/18/18 2215 15 134/63 95 %   05/18/18 2211 19 128/65 96 %   05/18/18 2203 14 - 97 %   05/18/18 2130 21 156/73 94 %   05/18/18 2115 18 124/83 95 %   05/18/18 2057 15 - 96 %   05/18/18  - (!) 59 14 - 95 %   05/18/18 2045 14 128/68 94 %   05/18/18 2034 14 122/60 97 %   05/18/18 2005 15 - 93 %   05/18/18 2000 14 131/67 95 %   05/18/18 1945 17 137/68 93 %   05/18/18 193 19 134/75 94 %   05/18/18 191 15 135/73 96 %   05/18/18 190 -  15 127/78 97 %   05/18/18 184 -  15 139/68 96 %   05/18/18 183 18 - 96 %   05/18/18 1830  22 140/77 97 %   05/18/18 1816 - 70 21 140/71 96 %   05/18/18 1800 - 69 19 103/52 98 %   05/18/18 1745 - 67 17 128/75 97 %   05/18/18 1730 - 65 17 133/72 97 %   05/18/18 171 - 66 13 124/64 98 %   05/18/18 1700 - 69 19 140/76 99 %   05/18/18 1645 - 67 11 141/78 97 %   05/18/18 1630 - 69 19 140/76 99 %   05/18/18 1615 - 68 17 143/73 99 %   05/18/18 1600 - 70 19 142/74 99 %   05/18/18 1555 99 °F (37.2 °C) - - - -   05/18/18 1548 - - - 129/72 -   05/18/18 1302 98.5 °F (36.9 °C) 80 14 157/81 98 %      Temp (24hrs), Av.7 °F (37.1 °C), Min:98.5 °F (36.9 °C), Max:99 °F (37.2 °C)       O2 Device: Room air     PHYSICAL EXAM:   General:                    Alert, cooperative in no distress     HEENT:                     Normocephalic, atraumatic    PERRL, EOMI  Sclera no icterus                                              Nasal mucosa without masses or discharge  Hearing intact to voice                                              Oropharynx without erythema or exudate  Pink MM  Neck:                                   No meningismus, trachea midline, no carotid bruits                                               Thyroid not enlarged, no nodules or tenderness  Chest wall:                Tender alone costochondral junction  Lungs:                       Clear to auscultation bilaterally. No wheezing or rales                                              No accessory muscle use or retractions. Heart:                                  Regular rate and rhythm,  no murmur or gallop. No LE edema  Abdomen:                  Soft, non-tender. Not distended. Bowel sounds normal.                                               No masses, No Hepatosplenomegaly, No Rebound or guarding  Lymph nodes: No cervical or inguinal CADENCE  Musculoskeletal:  No Joint swelling, erythema, warmth. No Cyanosis or clubbing  Skin:                                     No rashes                                               Not Jaundiced   No nodules or thickening  Neurologic:                Alert and oriented X 3, follows commands                                               Cranial nerves 2 to 12 intact                                              Symmetric motor strength bilaterally          Admission Labs:  CBC: 5/18/2018: HCT 41.2 % (Ref range: 36.6 - 50.3 %); HGB 13.6 g/dL (Ref range: 12.1 - 17.0 g/dL); PLATELET 566 K/uL (Ref range: 150 - 400 K/uL); RBC 4.06 M/uL* (Ref range: 4.10 - 5.70 M/uL); WBC 10.2 K/uL (Ref range: 4.1 - 11.1 K/uL)  5/19/2018: HCT 39.0 % (Ref range: 36.6 - 50.3 %); HGB 13.0 g/dL (Ref range: 12.1 - 17.0 g/dL); PLATELET 850 K/uL (Ref range: 150 - 400 K/uL); RBC 3.83 M/uL* (Ref range: 4.10 - 5.70 M/uL); WBC 7.6 K/uL (Ref range: 4.1 - 11.1 K/uL)   BMP: 5/18/2018: BUN 32 MG/DL* (Ref range: 6 - 20 MG/DL); Calcium 8.9 MG/DL (Ref range: 8.5 - 10.1 MG/DL); Chloride 108 mmol/L (Ref range: 97 - 108 mmol/L); CO2 23 mmol/L (Ref range: 21 - 32 mmol/L); Creatinine 2.54 MG/DL* (Ref range: 0.70 - 1.30 MG/DL); Glucose 151 mg/dL* (Ref range: 65 - 100 mg/dL); Potassium 4.5 mmol/L (Ref range: 3.5 - 5.1 mmol/L); Sodium 139 mmol/L (Ref range: 136 - 145 mmol/L)  5/19/2018: BUN 31 MG/DL* (Ref range: 6 - 20 MG/DL); Calcium 9.1 MG/DL (Ref range: 8.5 - 10.1 MG/DL);  Chloride 111 mmol/L* (Ref range: 97 - 108 mmol/L); CO2 22 mmol/L (Ref range: 21 - 32 mmol/L); Creatinine 2.13 MG/DL* (Ref range: 0.70 - 1.30 MG/DL); Glucose 110 mg/dL* (Ref range: 65 - 100 mg/dL); Potassium 4.2 mmol/L (Ref range: 3.5 - 5.1 mmol/L); Sodium 139 mmol/L (Ref range: 136 - 145 mmol/L)  Coagulation: No results found for requested labs within first 48 hours of the last admission day. Cardiac markers: 5/18/2018:  U/L (Ref range: 39 - 308 U/L)  5/19/2018:  U/L (Ref range: 39 - 308 U/L)  Liver: 5/18/2018: Albumin 3.8 g/dL (Ref range: 3.5 - 5.0 g/dL); Alk. phosphatase 45 U/L (Ref range: 45 - 117 U/L); AST (SGOT) 19 U/L (Ref range: 15 - 37 U/L); Protein, total 7.5 g/dL (Ref range: 6.4 - 8.2 g/dL)  5/19/2018: Albumin 3.6 g/dL (Ref range: 3.5 - 5.0 g/dL); Alk. phosphatase 39 U/L* (Ref range: 45 - 117 U/L); AST (SGOT) 16 U/L (Ref range: 15 - 37 U/L);  Protein, total 7.2 g/dL (Ref range: 6.4 - 8.2 g/dL)    Discharge Labs:  Recent Results (from the past 24 hour(s))   CBC W/O DIFF    Collection Time: 05/20/18  5:01 AM   Result Value Ref Range    WBC 5.8 4.1 - 11.1 K/uL    RBC 4.13 4.10 - 5.70 M/uL    HGB 13.8 12.1 - 17.0 g/dL    HCT 41.8 36.6 - 50.3 %    .2 (H) 80.0 - 99.0 FL    MCH 33.4 26.0 - 34.0 PG    MCHC 33.0 30.0 - 36.5 g/dL    RDW 14.1 11.5 - 14.5 %    PLATELET 012 235 - 701 K/uL    MPV 11.2 8.9 - 12.9 FL    NRBC 0.0 0  WBC    ABSOLUTE NRBC 0.00 0.00 - 2.92 K/uL   METABOLIC PANEL, BASIC    Collection Time: 05/20/18  5:01 AM   Result Value Ref Range    Sodium 140 136 - 145 mmol/L    Potassium 4.2 3.5 - 5.1 mmol/L    Chloride 110 (H) 97 - 108 mmol/L    CO2 22 21 - 32 mmol/L    Anion gap 8 5 - 15 mmol/L    Glucose 116 (H) 65 - 100 mg/dL    BUN 34 (H) 6 - 20 MG/DL    Creatinine 2.25 (H) 0.70 - 1.30 MG/DL    BUN/Creatinine ratio 15 12 - 20      GFR est AA 35 (L) >60 ml/min/1.73m2    GFR est non-AA 29 (L) >60 ml/min/1.73m2    Calcium 9.5 8.5 - 10.1 MG/DL       Significant Diagnostic Studies:   X-Ray: Chest xray - No acute abnormality and no change     LE duplex - NORMAL DUPLEX VENOUS DOPPLER EXAMINATION     NM V/Q scan - Low probability for pulmonary embolism     Procedures:   Echo - Left ventricle: Systolic function was normal. Ejection fraction was  estimated in the range of 60 % to 65 %. There were no regional wall motion  abnormalities. Hospital Course:  He was admitted to telemetry and MI was ruled out. Patient was continued on ASA. See by cardiology and felt to have pericarditis -started on colchicine. Pain resolved and patient ambulated without reoccurrence. Echo cardiogram was WNL. Patient discharged home on colchicine BID for 3 months and will return to see cardiology as outpatient for stress testing      Disposition: home    Activity: as tolerated     Diet: cardiac    Follow up appointment: Dr Carol Powell and Dr Gertrude Shankar in 1 week     Patient Instructions:   Current Discharge Medication List      START taking these medications    Details   colchicine 0.6 mg tablet Take 1 Tab by mouth two (2) times a day. Qty: 60 Tab, Refills: 0         CONTINUE these medications which have CHANGED    Details   allopurinol (ZYLOPRIM) 100 mg tablet Take 2 Tabs by mouth daily. Qty: 60 Tab, Refills: 6      lisinopril (PRINIVIL, ZESTRIL) 2.5 mg tablet Take 1 Tab by mouth daily. Qty: 30 Tab, Refills: 0         CONTINUE these medications which have NOT CHANGED    Details   atenolol (TENORMIN) 25 mg tablet Take 12.5 mg by mouth daily (with lunch). fenofibrate (LOFIBRA) 160 mg tablet Take 160 mg by mouth daily (with lunch). Potassium Citrate (UROCIT-K) TbER tablet Take 15 mEq by mouth daily (with lunch). vitamin E (AQUA GEMS) 400 unit capsule Take 400 Units by mouth daily (with lunch). omega 3-dha-epa-fish oil (FISH OIL) 100-160-1,000 mg cap Take 1,000 mg by mouth daily (with lunch). cholecalciferol (VITAMIN D3) 1,000 unit tablet Take 500 Units by mouth two (2) times a day.       ascorbic acid, vitamin C, (VITAMIN C) 500 mg tablet Take 500 mg by mouth daily (with lunch). aspirin (ASPIRIN) 325 mg tablet Take 325 mg by mouth nightly.              Wound Care: None needed    Signed:  Dexter Cheatham MD  5/20/2018  3:18 PM

## 2018-05-20 NOTE — PROGRESS NOTES
[address] [place] [address]  [phone]      Medical Progress Note      NAME: Loretta Quijano. :  1950  MRM:  [id number]    Date/Time: 2018  6:33 AM         Subjective:     Admitted with chest pain which was pleuritic and worsening, which eventually began to radiate into neck, jaw,face. Pain has now largely resolved. Workup negative so far. AVVS.    Past Medical History:   Diagnosis Date    Finding of rib structure     right lower rib cartilage weak    Hypertension     Other ill-defined conditions(799.89)     gout    Unspecified adverse effect of anesthesia     lays on back,chokes on drainage    Unspecified sleep apnea     septum surgery to fix, still an issue       ROS:  General: negative for fever, chills, sweats, weakness  Respiratory:  negative for cough, sputum production, SOB, wheezing, BACA, pleuritic pain  Cardiology:  negative for chest pain, palpitations, orthopnea, PND, edema, syncope   Gastrointestinal: negative for abdominal pain, N/V, dysphagia, change in bowel habits, bleeding  Genitourinary: negative for frequency, urgency, dysuria, hematuria, incontinence  Muskuloskeletal : negative for arthralgia, myalgia  Dermatological: negative for rash, ulceration, mole change, new lesion  Neurological: negative for headache, dizziness, confusion, focal weakness, paresthesia, memory loss, gait disturbance  Psychological: negative for anxiety, depression, agitation  Review of systems otherwise negative         Objective:       Vitals:        Last 24hrs VS reviewed since prior progress note.  Most recent are:    Visit Vitals    /66 (BP 1 Location: Left arm, BP Patient Position: At rest)    Pulse 66    Temp 97.7 °F (36.5 °C)    Resp 18    Ht 5' 6\" (1.676 m)    Wt 188 lb 7.9 oz (85.5 kg)    SpO2 97%    BMI 30.42 kg/m2     SpO2 Readings from Last 6 Encounters:   18 97%   18 95%   12 97%   05/27/10 98%   05/12/10 99%            Intake/Output Summary (Last 24 hours) at 05/20/18 3118  Last data filed at 05/20/18 0320   Gross per 24 hour   Intake              920 ml   Output                0 ml   Net              920 ml        Telemetry reviewed:   normal sinus rhythm  Tubes:   N/A  X-Ray:  Chest xray - No acute abnormality and no change    LE duplex - NORMAL DUPLEX VENOUS DOPPLER EXAMINATION    NM V/Q scan - Low probability for pulmonary embolism    Procedures:   N/A    Exam:     General   well developed, well nourished, appears stated age, in no acute distress  Neck   Supple without nodes or mass. No thyromegaly or bruit  Respiratory   Clear To Auscultation bilaterally - no wheezes, rales, rhonchi, or crackles  Cardiology  Regular Rate and Rythmn  - no murmurs, rubs or gallops  Abdominal  Soft, non-tender, non-distended, positive bowel sounds, no hepatosplenomegaly  Extremities  No clubbing, cyanosis, or edema. Pulses intact. Skin  Normal skin turgor. No rashes or skin ulcers noted  Neurological  No focal neurological deficits noted  Psychological  Oriented x 3. Normal affect.   Exam otherwise negative     Lab Data Reviewed: (see below)    Medications Reviewed: (see below)    ______________________________________________________________________    Medications:     Current Facility-Administered Medications   Medication Dose Route Frequency    colchicine tablet 0.6 mg  0.6 mg Oral BID    sodium chloride (NS) flush 5-10 mL  5-10 mL IntraVENous Q8H    sodium chloride (NS) flush 5-10 mL  5-10 mL IntraVENous PRN    acetaminophen (TYLENOL) tablet 650 mg  650 mg Oral Q6H PRN    oxyCODONE-acetaminophen (PERCOCET) 5-325 mg per tablet 1 Tab  1 Tab Oral Q6H PRN    naloxone (NARCAN) injection 0.4 mg  0.4 mg IntraVENous PRN    ondansetron (ZOFRAN) injection 4 mg  4 mg IntraVENous Q4H PRN    bisacodyl (DULCOLAX) suppository 10 mg  10 mg Rectal DAILY PRN    enoxaparin (LOVENOX) injection 30 mg  30 mg SubCUTAneous Q24H    atenolol (TENORMIN) tablet 12.5 mg  12.5 mg Oral DAILY WITH LUNCH    lisinopril (PRINIVIL, ZESTRIL) tablet 5 mg  5 mg Oral DAILY WITH LUNCH    aspirin (ASPIRIN) tablet 325 mg  325 mg Oral QHS    fenofibrate nanocrystallized (TRICOR) tablet 145 mg  145 mg Oral DAILY    ascorbic acid (vitamin C) (VITAMIN C) tablet 500 mg  500 mg Oral DAILY WITH LUNCH    allopurinol (ZYLOPRIM) tablet 200 mg  200 mg Oral DAILY WITH LUNCH            Lab Review:     Recent Labs      05/20/18   0501  05/19/18   0333  05/18/18   1326   WBC  5.8  7.6  10.2   HGB  13.8  13.0  13.6   HCT  41.8  39.0  41.2   PLT  165  161  181     Recent Labs      05/20/18   0501  05/19/18   0333  05/18/18   1326   NA  140  139  139   K  4.2  4.2  4.5   CL  110*  111*  108   CO2  22  22  23   GLU  116*  110*  151*   BUN  34*  31*  32*   CREA  2.25*  2.13*  2.54*   CA  9.5  9.1  8.9   ALB   --   3.6  3.8   TBILI   --   0.9  0.6   SGOT   --   16  19   ALT   --   24  27     No results found for: GLUCPOC  No results for input(s): PH, PCO2, PO2, HCO3, FIO2 in the last 72 hours. No results for input(s): INR in the last 72 hours. No lab exists for component: INREXT, INREXT         Assessment:     Principal Problem:    Chest pain (5/18/2018)    Active Problems:    Hypertension (7/23/2017)      CKD (chronic kidney disease) stage 3, GFR 30-59 ml/min (7/23/2017)      Dyslipidemia (7/23/2017)      Pre-diabetes (7/23/2017)           Plan:     Risk of deterioration: medium             1. Cardiac enzymes negative to date  2. CT not done due to CKD  3. Echo not done  4.  Home if echo normal, on colchicine, but will need NM EST arranged for near future                     Care Plan discussed with: Patient    Discussed:  Care Plan    Prophylaxis:  Lovenox    Disposition:  Home w/Family           ___________________________________________________    Attending Physician: Zora Zamarripa MD

## 2018-05-20 NOTE — PROGRESS NOTES
1545: Discharge instructions have been reviewed with pt and spouse. Time allotted for pt questions and pt teaching. PIV and telemetry have been removed. Pt will follow up outpatient with PCP and cardiologist following discharge. All personal belongings have been removed from pts room. Pt will be transported home via private vehicle.

## 2018-05-22 DIAGNOSIS — I10 ESSENTIAL HYPERTENSION: ICD-10-CM

## 2018-05-22 DIAGNOSIS — R07.2 PRECORDIAL PAIN: Primary | ICD-10-CM

## 2018-05-23 ENCOUNTER — OFFICE VISIT (OUTPATIENT)
Dept: INTERNAL MEDICINE CLINIC | Age: 68
End: 2018-05-23

## 2018-05-23 VITALS
RESPIRATION RATE: 16 BRPM | HEIGHT: 66 IN | TEMPERATURE: 97.9 F | WEIGHT: 188 LBS | BODY MASS INDEX: 30.22 KG/M2 | OXYGEN SATURATION: 97 % | HEART RATE: 60 BPM | SYSTOLIC BLOOD PRESSURE: 152 MMHG | DIASTOLIC BLOOD PRESSURE: 68 MMHG

## 2018-05-23 DIAGNOSIS — I30.0 ACUTE IDIOPATHIC PERICARDITIS: Primary | ICD-10-CM

## 2018-05-23 NOTE — PROGRESS NOTES
Lacey Mercado is a 77 y.o. male  Chief Complaint   Patient presents with   [facility] Follow Up     inflammed paracardium per patient statement     Visit Vitals    /68 (BP 1 Location: Left arm, BP Patient Position: Sitting)    Pulse 60    Temp 97.9 °F (36.6 °C) (Oral)    Resp 16    Ht 5' 6\" (1.676 m)    Wt 188 lb (85.3 kg)    SpO2 97%    BMI 30.34 kg/m2     1. Have you been to the ER, urgent care clinic since your last visit? Hospitalized since your last visit?   no    2. Have you seen or consulted any other health care providers outside of the [facility] since your last visit? Include any pap smears or colon screening.  no

## 2018-05-23 NOTE — MR AVS SNAPSHOT
[facility] 
 
 
 [address] [address] [phone] Patient: Malika Wilkins 
MRN: [id number] CELINA:[birth year] Visit Information Date & Time Provider Department Dept. Phone Encounter #  
 5/23/2018  1:30 PM Rasheeda Pugh MD [facility] [id number] Follow-up Instructions Return for As previously scheduled. Your Appointments 5/25/2018 11:00 AM  
STRESS ECHOCARDIOGRAMS with [facility] [address] [place] [address]) Appt Note: ECHO TTE STRESS COMP W OR WO CONTR [[id number]] (Order [id number]) advised patient no caffeine 5/22/18 [place] [address] [facility]  
[phone] [address] [facility]  
  
    
 8/3/2018  8:00 AM  
Follow Up with Rasheeda Pugh MD  
[facility] ([address]) Appt Note: 6 mo fu  
 [address] [address] [address] [facility] [address] Upcoming Health Maintenance Date Due Hepatitis C Screening 1950 DTaP/Tdap/Td series (1 - Tdap) 1/8/1969 FOBT Q 1 YEAR AGE 50-75 1/8/1998 ZOSTER VACCINE AGE 60> 11/8/2007 GLAUCOMA SCREENING Q2Y 1/8/2013 Pneumococcal 65+ High/Highest Risk (2 of 2 - PPSV23) 3/28/2018 Influenza Age 5 to Adult 8/1/2018 Allergies as of 5/23/2018  Review Complete On: 5/23/2018 By: Rasheeda Pugh MD  
 No Known Allergies Current Immunizations  Never Reviewed No immunizations on file. Not reviewed this visit You Were Diagnosed With   
  
 Codes Comments Acute idiopathic pericarditis    -  Primary ICD-10-CM: I30.0 ICD-9-CM: 420.91 Vitals BP Pulse Temp Resp Height(growth percentile) Weight(growth percentile)  152/68 (BP 1 Location: Left arm, BP Patient Position: Sitting) 60 97.9 °F (36.6 °C) (Oral) 16 5' 6\" (1.676 m) 188 lb (85.3 kg) SpO2 BMI Smoking Status 97% 30.34 kg/m2 Never Smoker BMI and BSA Data Body Mass Index Body Surface Area  
 30.34 kg/m 2 1.99 m 2 Preferred Pharmacy Pharmacy Name Phone [facility] [address] [place] [address] Dr John, [address] [phone] Your Updated Medication List  
  
   
This list is accurate as of 5/23/18  1:56 PM.  Always use your most recent med list.  
  
  
  
  
 allopurinol 100 mg tablet Commonly known as:  Pollyann Raja Take 2 Tabs by mouth daily. ascorbic acid (vitamin C) 500 mg tablet Commonly known as:  VITAMIN C Take 500 mg by mouth daily (with lunch). aspirin 325 mg tablet Commonly known as:  ASPIRIN Take 325 mg by mouth nightly. cholecalciferol 1,000 unit tablet Commonly known as:  VITAMIN D3 Take 500 Units by mouth two (2) times a day. colchicine 0.6 mg tablet Take 1 Tab by mouth two (2) times a day. fenofibrate 160 mg tablet Commonly known as:  LOFIBRA Take 160 mg by mouth daily (with lunch). FISH -160-1,000 mg Cap Generic drug:  omega 3-dha-epa-fish oil Take 1,000 mg by mouth daily (with lunch). lisinopril 2.5 mg tablet Commonly known as:  [place] Take 1 Tab by mouth daily. Potassium Citrate Tber tablet Commonly known as:  [employer] Take 15 mEq by mouth daily (with lunch). vitamin E 400 unit capsule Commonly known as:  [address] Take 400 Units by mouth daily (with lunch). Follow-up Instructions Return for As previously scheduled. Patient Instructions Pericarditis: Care Instructions Your Care Instructions Pericarditis occurs when the membrane that surrounds the heart and its major blood vessels becomes inflamed. In most cases, the cause is not known. It can be caused by a virus, a heart attack, or a chest injury.  It also can be caused by another type of illness. Pericarditis causes sharp chest pain. This pain gets worse when you lie down or take a deep breath. The pain gets better if you lean forward or sit up. Pericarditis often heals on its own. It usually does not cause any more problems. Most people get better within a couple of weeks. Follow-up care is a key part of your treatment and safety. Be sure to make and go to all appointments, and call your doctor if you are having problems. It's also a good idea to know your test results and keep a list of the medicines you take. How can you care for yourself at home? · Watch for the return of your symptoms. Sometimes pericarditis can come back after it has gone away. · Be safe with medicines. Take your medicines exactly as prescribed. Call your doctor if you think you are having a problem with your medicine. · Ask your doctor if you can take an over-the-counter pain medicine, such as acetaminophen (Tylenol), ibuprofen (Advil, Motrin), or naproxen (Aleve). Read and follow all instructions on the label. · Do not take two or more pain medicines at the same time unless the doctor told you to. Many pain medicines have acetaminophen, which is Tylenol. Too much acetaminophen (Tylenol) can be harmful. · Get plenty of rest until you feel better, especially if you have a fever. · Avoid exercise and strenuous activity that has not been approved by your doctor. Ask your doctor when you can be active again. When should you call for help? Call 911 anytime you think you may need emergency care. For example, call if: 
? · You have severe trouble breathing. ? · You passed out (lost consciousness). ?Call your doctor now or seek immediate medical care if: 
? · You are dizzy or lightheaded, or you feel like you may faint. ? · You have trouble breathing. ? · You have a new or higher fever. ? Watch closely for changes in your health, and be sure to contact your doctor if: ? · You do not get better as expected. Where can you learn more? Go to http://sharon-lloyd.info/. Enter 542 [id number] in the search box to learn more about \"Pericarditis: Care Instructions. \" Current as of: September 21, 2016 Content Version: 11.4 © 1270-3355 [employer]ivo[employer]. Care instructions adapted under license by [employer] (which disclaims liability or warranty for this information). If you have questions about a medical condition or this instruction, always ask your healthcare professional. [address] any warranty or liability for your use of this information. Introducing [facility]! Thierno Langford introduces Anchorâ„¢ patient portal. Now you can access parts of your medical record, email your doctor's office, and request medication refills online. 1. In your internet browser, go to [email or web address] 2. Click on the First Time User? Click Here link in the Sign In box. You will see the New Member Sign Up page. 3. Enter your Anchorâ„¢ Access Code exactly as it appears below. You will not need to use this code after youve completed the sign-up process. If you do not sign up before the expiration date, you must request a new code. · Anchorâ„¢ Access Code: [email or web address] Expires: 8/16/2018  1:02 PM 
 
4. Enter the last four digits of your Social Security Number (xxxx) and Date of Birth (mm/dd/yyyy) as indicated and click Submit. You will be taken to the next sign-up page. 5. Create a Connexicat ID. This will be your Anchorâ„¢ login ID and cannot be changed, so think of one that is secure and easy to remember. 6. Create a Anchorâ„¢ password. You can change your password at any time. 7. Enter your Password Reset Question and Answer. This can be used at a later time if you forget your password. 8. Enter your e-mail address. You will receive e-mail notification when new information is available in [address] 9. Click Sign Up. You can now view and download portions of your medical record. 10. Click the Download Summary menu link to download a portable copy of your medical information. If you have questions, please visit the Frequently Asked Questions section of the [email or web address] website. Remember, [email or web address].com is NOT to be used for urgent needs. For medical emergencies, dial 911. Now available from your iPhone and Android! Please provide this summary of care documentation to your next provider. Your primary care clinician is listed as BOBY Rivera. If you have any questions after today's visit, please call [phone].

## 2018-05-23 NOTE — PROGRESS NOTES
This note will not be viewable in [address] Subjective:     Mr. Felipa Feliz returns to the office today and transition of care subsequent to a hospitalization from 5/18-5/20 when he was admitted with substernal chest pain. Was admitted to telemetry and MI was ruled out. He was seen by cardiology and felt to have pericarditis and started on colchicine. An echocardiogram was normal.  He was discharged home on twice daily colchicine in addition to his allopurinol but she uses for gout. Since he was discharged he has had no further chest discomfort or problems. He denies GI upset related to the colchicine. He is due to see Dr. Yari Mancuso in 2 days for follow-up and stress testing. Past Medical History:   Diagnosis Date    Acute idiopathic pericarditis 5/23/2018    Finding of rib structure     right lower rib cartilage weak    Hypertension     Other ill-defined conditions(799.89)     gout    Unspecified adverse effect of anesthesia     lays on back,chokes on drainage    Unspecified sleep apnea     septum surgery to fix, still an issue     Past Surgical History:   Procedure Laterality Date    HX KNEE ARTHROSCOPY      HX OTHER SURGICAL      kidney stones     No Known Allergies  Current Outpatient Prescriptions   Medication Sig Dispense Refill    allopurinol (ZYLOPRIM) 100 mg tablet Take 2 Tabs by mouth daily. 60 Tab 6    lisinopril (PRINIVIL, ZESTRIL) 2.5 mg tablet Take 1 Tab by mouth daily. 30 Tab 0    colchicine 0.6 mg tablet Take 1 Tab by mouth two (2) times a day. 60 Tab 0    fenofibrate (LOFIBRA) 160 mg tablet Take 160 mg by mouth daily (with lunch).  Potassium Citrate (UROCIT-K) TbER tablet Take 15 mEq by mouth daily (with lunch).  vitamin E (AQUA GEMS) 400 unit capsule Take 400 Units by mouth daily (with lunch).  omega 3-dha-epa-fish oil (FISH OIL) 100-160-1,000 mg cap Take 1,000 mg by mouth daily (with lunch).       cholecalciferol (VITAMIN D3) 1,000 unit tablet Take 500 Units by mouth two (2) times a day.  ascorbic acid, vitamin C, (VITAMIN C) 500 mg tablet Take 500 mg by mouth daily (with lunch).  aspirin (ASPIRIN) 325 mg tablet Take 325 mg by mouth nightly. Social History     Social History    Marital status:      Spouse name: N/A    Number of children: N/A    Years of education: N/A     Social History Main Topics    Smoking status: Never Smoker    Smokeless tobacco: Never Used    Alcohol use No    Drug use: No    Sexual activity: Not Asked     Other Topics Concern    None     Social History Narrative     Family History   Problem Relation Age of Onset    Diabetes Mother     Hypertension Father        Review of Systems:  GEN: no weight loss, weight gain, fatigue or night sweats  CV: no PND, orthopnea, or palpitations  Resp: no dyspnea on exertion, no cough  Abd: no nausea, vomiting or diarrhea  EXT: denies edema, claudication  Endocrine: no hair loss, excessive thirst or polyuria  Neurological ROS: no TIA or stroke symptoms  ROS otherwise negative      Objective:     Visit Vitals    /68 (BP 1 Location: Left arm, BP Patient Position: Sitting)    Pulse 60    Temp 97.9 °F (36.6 °C) (Oral)    Resp 16    Ht 5' 6\" (1.676 m)    Wt 188 lb (85.3 kg)    SpO2 97%    BMI 30.34 kg/m2     Body mass index is 30.34 kg/(m^2). General:   alert, cooperative and no distress   Eyes: conjunctivae/sclerae clear. PERRL, EOM's intact   Mouth:  No oral lesions, no pharyngeal erythema, no exudates   Neck: Trachea midline, no thyromegaly, no bruits   Heart: S1 and S2 normal,no murmurs noted    Lungs: Clear to auscultation bilaterally, no increased work of breathing   Abdomen: Soft, nontender.   Normal bowel sounds   Extremities: No edema or cyanosis   Neuro: ..alert, oriented x3,speech normal in context and clarity, cranial nerves II-XII intact,motor strength: full proximally and distally,gait: normal  reflexes: full and symmetric     Physical exam otherwise negative         Assessment/Plan:     Diagnoses and all orders for this visit:    Acute idiopathic pericarditis        Other instructions: The patient's medications are reviewed and reconciled. No change in his current medical regimen is made. Hospital records were accessed and reviewed during the course of this office visit    Cardiology follow-up for stress testing as scheduled in 48 hours    Follow-up here as previously scheduled    Follow-up Disposition:  Return for As previously scheduled.     Rasheeda Pugh MD

## 2018-05-23 NOTE — PATIENT INSTRUCTIONS
Pericarditis: Care Instructions  Your Care Instructions    Pericarditis occurs when the membrane that surrounds the heart and its major blood vessels becomes inflamed. In most cases, the cause is not known. It can be caused by a virus, a heart attack, or a chest injury. It also can be caused by another type of illness. Pericarditis causes sharp chest pain. This pain gets worse when you lie down or take a deep breath. The pain gets better if you lean forward or sit up. Pericarditis often heals on its own. It usually does not cause any more problems. Most people get better within a couple of weeks. Follow-up care is a key part of your treatment and safety. Be sure to make and go to all appointments, and call your doctor if you are having problems. It's also a good idea to know your test results and keep a list of the medicines you take. How can you care for yourself at home? · Watch for the return of your symptoms. Sometimes pericarditis can come back after it has gone away. · Be safe with medicines. Take your medicines exactly as prescribed. Call your doctor if you think you are having a problem with your medicine. · Ask your doctor if you can take an over-the-counter pain medicine, such as acetaminophen (Tylenol), ibuprofen (Advil, Motrin), or naproxen (Aleve). Read and follow all instructions on the label. · Do not take two or more pain medicines at the same time unless the doctor told you to. Many pain medicines have acetaminophen, which is Tylenol. Too much acetaminophen (Tylenol) can be harmful. · Get plenty of rest until you feel better, especially if you have a fever. · Avoid exercise and strenuous activity that has not been approved by your doctor. Ask your doctor when you can be active again. When should you call for help? Call 911 anytime you think you may need emergency care. For example, call if:  ? · You have severe trouble breathing. ? · You passed out (lost consciousness).    ?Call your doctor now or seek immediate medical care if:  ? · You are dizzy or lightheaded, or you feel like you may faint. ? · You have trouble breathing. ? · You have a new or higher fever. ? Watch closely for changes in your health, and be sure to contact your doctor if:  ? · You do not get better as expected. Where can you learn more? Go to http://sharon-lloyd.info/. Enter 818 1155 in the search box to learn more about \"Pericarditis: Care Instructions. \"  Current as of: September 21, 2016  Content Version: 11.4  © 1348-5427 [employer]. Care instructions adapted under license by [employer] (which disclaims liability or warranty for this information). If you have questions about a medical condition or this instruction, always ask your healthcare professional. [address] any warranty or liability for your use of this information.

## 2018-05-25 ENCOUNTER — CLINICAL SUPPORT (OUTPATIENT)
Dept: CARDIOLOGY CLINIC | Age: 68
End: 2018-05-25

## 2018-05-25 DIAGNOSIS — R07.2 PRECORDIAL PAIN: Primary | ICD-10-CM

## 2018-05-25 NOTE — PROGRESS NOTES
Identified patient using full name and . Patient education for testing complete. Patient verbalized understanding.     Sofia Ruelas RN

## 2018-05-29 ENCOUNTER — OFFICE VISIT (OUTPATIENT)
Dept: CARDIOLOGY CLINIC | Age: 68
End: 2018-05-29

## 2018-05-29 VITALS
DIASTOLIC BLOOD PRESSURE: 70 MMHG | OXYGEN SATURATION: 95 % | SYSTOLIC BLOOD PRESSURE: 130 MMHG | BODY MASS INDEX: 30.1 KG/M2 | WEIGHT: 187.3 LBS | RESPIRATION RATE: 16 BRPM | HEIGHT: 66 IN | HEART RATE: 60 BPM

## 2018-05-29 DIAGNOSIS — I10 ESSENTIAL HYPERTENSION: ICD-10-CM

## 2018-05-29 DIAGNOSIS — E78.5 DYSLIPIDEMIA: Primary | ICD-10-CM

## 2018-05-29 DIAGNOSIS — N18.30 CKD (CHRONIC KIDNEY DISEASE) STAGE 3, GFR 30-59 ML/MIN (HCC): ICD-10-CM

## 2018-05-29 DIAGNOSIS — I30.0 ACUTE IDIOPATHIC PERICARDITIS: ICD-10-CM

## 2018-05-29 RX ORDER — POTASSIUM CITRATE 15 MEQ/1
15 TABLET, EXTENDED RELEASE ORAL
COMMUNITY
End: 2018-05-29 | Stop reason: SDUPTHER

## 2018-05-29 NOTE — PROGRESS NOTES
1. Have you been to the ER, urgent care clinic since your last visit? Hospitalized since your last visit? Yes 5/2018    2. Have you seen or consulted any other health care providers outside of the [address] [place] [address] since your last visit? Include any pap smears or colon screening. Yes Dr Brennan Proper nephrologist.    Patient C/O GI upset and diarrhea when taking Colchicine not able to take twice daily due to this.

## 2018-05-29 NOTE — MR AVS SNAPSHOT
[address] [facility] 
[phone] Patient: Laron Shahid 
MRN: [id number] MAT:[birth year] Visit Information Date & Time Provider Department Dept. Phone Encounter #  
 5/29/2018  1:30 PM Star Collazo, [address] [place] [address] [facility] [phone] [id number] Follow-up Instructions Return in about 6 months (around 11/29/2018). Your Appointments 8/3/2018  8:00 AM  
Follow Up with MD Dallas [address] ([facility]) Appt Note: 6 mo fu  
 [address] [id number] [address] [facility] [address]  
  
    
 11/20/2018  9:45 AM  
ESTABLISHED PATIENT with Star Collazo MD  
[facility] [address] [place] [address]) [address] [facility]  
[phone] [address] [facility] Upcoming Health Maintenance Date Due Hepatitis C Screening 1950 DTaP/Tdap/Td series (1 - Tdap) 1/8/1969 FOBT Q 1 YEAR AGE 50-75 1/8/1998 ZOSTER VACCINE AGE 60> 11/8/2007 GLAUCOMA SCREENING Q2Y 1/8/2013 Pneumococcal 65+ High/Highest Risk (2 of 2 - PPSV23) 3/28/2018 Influenza Age 5 to Adult 8/1/2018 Allergies as of 5/29/2018  Review Complete On: 5/29/2018 By: Star Collazo MD  
 No Known Allergies Current Immunizations  Never Reviewed No immunizations on file. Not reviewed this visit You Were Diagnosed With   
  
 Codes Comments Dyslipidemia    -  Primary ICD-10-CM: E78.5 ICD-9-CM: 272.4 Acute idiopathic pericarditis     ICD-10-CM: I30.0 ICD-9-CM: 420.91 Essential hypertension     ICD-10-CM: I10 
ICD-9-CM: 401.9 CKD (chronic kidney disease) stage 3, GFR 30-59 ml/min     ICD-10-CM: N18.3 ICD-9-CM: 102. 3 Vitals BP Pulse Resp Height(growth percentile) Weight(growth percentile) SpO2  
 130/70 60 16 5' 6\" (1.676 m) 187 lb 4.8 oz (85 kg) 95% BMI Smoking Status 30.23 kg/m2 Never Smoker Vitals History BMI and BSA Data Body Mass Index Body Surface Area  
 30.23 kg/m 2 1.99 m 2 Preferred Pharmacy Pharmacy Name Phone [facility] [address] [place] [address] [address], [address] [phone] Your Updated Medication List  
  
   
This list is accurate as of 5/29/18  1:56 PM.  Always use your most recent med list.  
  
  
  
  
 allopurinol 100 mg tablet Commonly known as:  Merrily Schilder Take 2 Tabs by mouth daily. ascorbic acid (vitamin C) 500 mg tablet Commonly known as:  VITAMIN C Take 500 mg by mouth daily (with lunch). aspirin 325 mg tablet Commonly known as:  ASPIRIN Take 325 mg by mouth nightly. cholecalciferol 1,000 unit tablet Commonly known as:  VITAMIN D3 Take 500 Units by mouth two (2) times a day. colchicine 0.6 mg tablet Take 1 Tab by mouth two (2) times a day. fenofibrate 160 mg tablet Commonly known as:  LOFIBRA Take 160 mg by mouth daily (with lunch). FISH -160-1,000 mg Cap Generic drug:  omega 3-dha-epa-fish oil Take 1,000 mg by mouth daily (with lunch). lisinopril 2.5 mg tablet Commonly known as:  Therisa Semen Take 1 Tab by mouth daily. Potassium Citrate Tber tablet Commonly known as:  Mead[employer]vaco Take 15 mEq by mouth daily (with lunch). vitamin E 400 unit capsule Commonly known as:  [address] Take 400 Units by mouth daily (with lunch). We Performed the Following AMB POC EKG ROUTINE W/ 12 LEADS, INTER & REP [29792 CPT(R)] Follow-up Instructions Return in about 6 months (around 11/29/2018). Introducing [facility]!    
 Moiz Mckenna introduces [employer] patient portal. Now you can access parts of your medical record, email your doctor's office, and request medication refills online. 1. In your internet browser, go to [email or web address] 2. Click on the First Time User? Click Here link in the Sign In box. You will see the New Member Sign Up page. 3. Enter your Guardium Access Code exactly as it appears below. You will not need to use this code after youve completed the sign-up process. If you do not sign up before the expiration date, you must request a new code. · Guardium Access Code: [email or web address] Expires: 8/16/2018  1:02 PM 
 
4. Enter the last four digits of your Social Security Number (xxxx) and Date of Birth (mm/dd/yyyy) as indicated and click Submit. You will be taken to the next sign-up page. 5. Create a Guardium ID. This will be your Guardium login ID and cannot be changed, so think of one that is secure and easy to remember. 6. Create a Guardium password. You can change your password at any time. 7. Enter your Password Reset Question and Answer. This can be used at a later time if you forget your password. 8. Enter your e-mail address. You will receive e-mail notification when new information is available in [address] 9. Click Sign Up. You can now view and download portions of your medical record. 10. Click the Download Summary menu link to download a portable copy of your medical information. If you have questions, please visit the Frequently Asked Questions section of the Guardium website. Remember, Guardium is NOT to be used for urgent needs. For medical emergencies, dial 911. Now available from your iPhone and Android! Please provide this summary of care documentation to your next provider. Your primary care clinician is listed as BOBY Pepe 21. If you have any questions after today's visit, please call [phone].

## 2018-05-29 NOTE — PROGRESS NOTES
5/29/2018 1:55 PM      Subjective:     Alison Alfaro is here for f/u visit. Seen at [facility] for acute pericarditis. Now asymptomatic. He denies chest pain, chest pressure/discomfort, dyspnea, palpitations, irregular heart beats, near-syncope, syncope, fatigue, orthopnea, paroxysmal nocturnal dyspnea, exertional chest pressure/discomfort, claudication, lower extremity edema, tachypnea. Visit Vitals    /70    Pulse 60    Resp 16    Ht 5' 6\" (1.676 m)    Wt 187 lb 4.8 oz (85 kg)    SpO2 95%    BMI 30.23 kg/m2     Current Outpatient Prescriptions   Medication Sig    allopurinol (ZYLOPRIM) 100 mg tablet Take 2 Tabs by mouth daily.  lisinopril (PRINIVIL, ZESTRIL) 2.5 mg tablet Take 1 Tab by mouth daily.  colchicine 0.6 mg tablet Take 1 Tab by mouth two (2) times a day.  fenofibrate (LOFIBRA) 160 mg tablet Take 160 mg by mouth daily (with lunch).  Potassium Citrate (UROCIT-K) TbER tablet Take 15 mEq by mouth daily (with lunch).  vitamin E (AQUA GEMS) 400 unit capsule Take 400 Units by mouth daily (with lunch).  omega 3-dha-epa-fish oil (FISH OIL) 100-160-1,000 mg cap Take 1,000 mg by mouth daily (with lunch).  cholecalciferol (VITAMIN D3) 1,000 unit tablet Take 500 Units by mouth two (2) times a day.  ascorbic acid, vitamin C, (VITAMIN C) 500 mg tablet Take 500 mg by mouth daily (with lunch).  aspirin (ASPIRIN) 325 mg tablet Take 325 mg by mouth nightly. No current facility-administered medications for this visit.           Objective:      Visit Vitals    /70    Pulse 60    Resp 16    Ht 5' 6\" (1.676 m)    Wt 187 lb 4.8 oz (85 kg)    SpO2 95%    BMI 30.23 kg/m2       Data Review:     EKG: Normal sinus rhythm, no acute st/t changes    Reviewed and/or ordered active problem list, medication list tests    Past Medical History:   Diagnosis Date    Acute idiopathic pericarditis 5/23/2018    Finding of rib structure     right lower rib cartilage weak    Hypertension     Other ill-defined conditions(799.89)     gout    Unspecified adverse effect of anesthesia     lays on back,chokes on drainage    Unspecified sleep apnea     septum surgery to fix, still an issue      Past Surgical History:   Procedure Laterality Date    HX KNEE ARTHROSCOPY      HX OTHER SURGICAL      kidney stones     No Known Allergies   Family History   Problem Relation Age of Onset    Diabetes Mother     Hypertension Father       Social History     Social History    Marital status:      Spouse name: N/A    Number of children: N/A    Years of education: N/A     Occupational History    Not on file. Social History Main Topics    Smoking status: Never Smoker    Smokeless tobacco: Never Used    Alcohol use No    Drug use: No    Sexual activity: Not on file     Other Topics Concern    Not on file     Social History Narrative         Review of Systems     General: Not Present- Anorexia, Chills, Dietary Changes, Fatigue, Fever, Medication Changes, Night Sweats, Weight Gain > 10lbs. and Weight Loss > 10lbs. .  Skin: Not Present- Bruising and Excessive Sweating. HEENT: Not Present- Headache, Visual Loss and Vertigo. Respiratory: Not Present- Cough, Decreased Exercise Tolerance, Difficulty Breathing, Snoring and Wheezing. Cardiovascular: Not Present- Abnormal Blood Pressure, Chest Pain, Claudications, Difficulty Breathing On Exertion, Edema, Fainting / Blacking Out, Irregular Heart Beat, Night Cramps, Orthopnea, Palpitations, Paroxysmal Nocturnal Dyspnea, Rapid Heart Rate, Shortness of Breath and Swelling of Extremities. Gastrointestinal: Not Present- Black, Tarry Stool, Bloody Stool, Diarrhea, Hematemesis, Rectal Bleeding and Vomiting. Musculoskeletal: Not Present- Muscle Pain and Muscle Weakness. Neurological: Not Present- Dizziness. Psychiatric: Not Present- Depression.   Endocrine: Not Present- Cold Intolerance, Heat Intolerance and Thyroid Problems. Hematology: Not Present- Abnormal Bleeding, Anemia, Blood Clots and Easy Bruising.       Physical Exam   The physical exam findings are as follows:       General   Mental Status - Alert. General Appearance - Not in acute distress. Chest and Lung Exam   Inspection: Accessory muscles - No use of accessory muscles in breathing. Auscultation:   Breath sounds: - Normal.      Cardiovascular   Inspection: Jugular vein - Bilateral - Inspection Normal.  Palpation/Percussion:   Apical Impulse: - Normal.  Auscultation: Rhythm - Regular. Heart Sounds - S1 WNL and S2 WNL. No S3 or S4. Murmurs & Other Heart Sounds: Auscultation of the heart reveals - soft 2/6 systolic murmur over aortic area. Carotid arteries - No Carotid bruit. Peripheral Vascular   Upper Extremity: Inspection - Bilateral - No Cyanotic nailbeds or Digital clubbing. Lower Extremity:   Palpation: Edema - Bilateral - No edema. Assessment:       ICD-10-CM ICD-9-CM    1. Dyslipidemia E78.5 272.4 AMB POC EKG ROUTINE W/ 12 LEADS, INTER & REP   2. Acute idiopathic pericarditis I30.0 420.91    3. Essential hypertension I10 401.9    4. CKD (chronic kidney disease) stage 3, GFR 30-59 ml/min N18.3 585. 3        Plan:     1. Acute pericarditis: now asymptomatic. C/o some diarrhea with colchicine. Will try to complete 3 months of treatment of possible and side effect permits. On aspirin. 2. Normal BP. Continue current meds. 3. Last lipid panel reviewed. Diet and exercise.

## 2018-05-31 ENCOUNTER — TELEPHONE (OUTPATIENT)
Dept: CARDIOLOGY CLINIC | Age: 68
End: 2018-05-31

## 2018-05-31 NOTE — TELEPHONE ENCOUNTER
----- Message from Jessica Damian MD sent at 5/29/2018  8:33 AM EDT -----  Inform him stress test is k    Called pt,verified pt with two pt identifiers, told pt his stress test is normal. Pt verbalized that he understood everything.

## 2018-06-19 RX ORDER — COLCHICINE 0.6 MG/1
0.6 TABLET, FILM COATED ORAL 2 TIMES DAILY
Qty: 60 TAB | Refills: 3 | Status: SHIPPED | OUTPATIENT
Start: 2018-06-19 | End: 2022-07-29

## 2018-06-19 NOTE — TELEPHONE ENCOUNTER
Plan prefers brandname        Requested Prescriptions     Pending Prescriptions Disp Refills    COLCRYS 0.6 mg tablet 60 Tab 3     Sig: Take 1 Tab by mouth two (2) times a day.

## 2018-08-03 ENCOUNTER — OFFICE VISIT (OUTPATIENT)
Dept: INTERNAL MEDICINE CLINIC | Age: 68
End: 2018-08-03

## 2018-08-03 VITALS
SYSTOLIC BLOOD PRESSURE: 138 MMHG | BODY MASS INDEX: 30.25 KG/M2 | HEART RATE: 60 BPM | DIASTOLIC BLOOD PRESSURE: 82 MMHG | HEIGHT: 66 IN | OXYGEN SATURATION: 97 % | WEIGHT: 188.2 LBS

## 2018-08-03 DIAGNOSIS — M10.9 GOUT, UNSPECIFIED CAUSE, UNSPECIFIED CHRONICITY, UNSPECIFIED SITE: ICD-10-CM

## 2018-08-03 DIAGNOSIS — E78.5 DYSLIPIDEMIA: ICD-10-CM

## 2018-08-03 DIAGNOSIS — I10 ESSENTIAL HYPERTENSION: Primary | ICD-10-CM

## 2018-08-03 DIAGNOSIS — R73.03 PRE-DIABETES: ICD-10-CM

## 2018-08-03 DIAGNOSIS — N18.30 CKD (CHRONIC KIDNEY DISEASE) STAGE 3, GFR 30-59 ML/MIN (HCC): ICD-10-CM

## 2018-08-03 RX ORDER — ATENOLOL 25 MG/1
12.5 TABLET ORAL DAILY
COMMUNITY
End: 2019-09-18 | Stop reason: ALTCHOICE

## 2018-08-03 NOTE — PATIENT INSTRUCTIONS
Learning About Cutting Calories How do calories affect your weight? Food gives your body energy. Energy from the food you eat is measured in calories. This energy keeps your heart beating, your brain active, and your muscles working. Your body needs a certain number of calories each day. After your body uses the calories it needs, it stores extra calories as fat. To lose weight safely, you have to eat fewer calories while eating in a healthy way. How many calories do you need each day? The more active you are, the more calories you need. When you are less active, you need fewer calories. How many calories you need each day also depends on several things, including your age and whether you are male or female. Here are some general guidelines for adults: 
· Less active women and older adults need 1,600 to 2,000 calories each day. · Active women and less active men need 2,000 to 2,400 calories each day. · Active men need 2,400 to 3,000 calories each day. How can you cut calories and eat healthy meals? Whole grains, vegetables and fruits, and dried beans are good lower-calorie foods. They give you lots of nutrients and fiber. And they fill you up. Sweets, energy drinks, and soda pop are high in calories. They give you few nutrients and no fiber. Try to limit soda pop, fruit juice, and energy drinks. Drink water instead. Some fats can be part of a healthy diet. But cutting back on fats from highly processed foods like fast foods and many snack foods is a good way to lower the calories in your diet. Also, use smaller amounts of fats like butter, margarine, salad dressing, and mayonnaise. Add fresh garlic, lemon, or herbs to your meals to add flavor without adding fat. Meats and dairy products can be a big source of hidden fats. Try to choose lean or low-fat versions of these products. Fat-free cookies, candies, chips, and frozen treats can still be high in sugar and calories.  Some fat-free foods have more calories than regular ones. Eat fat-free treats in moderation, as you would other foods. If your favorite foods are high in fat, salt, sugar, or calories, limit how often you eat them. Eat smaller servings, or look for healthy substitutes. Fill up on fruits, vegetables, and whole grains. Eating at home · Use meat as a side dish instead of as the main part of your meal. 
· Try main dishes that use whole wheat pasta, brown rice, dried beans, or vegetables. · Find ways to cook with little or no fat, such as broiling, steaming, or grilling. · Use cooking spray instead of oil. If you use oil, use a monounsaturated oil, such as canola or olive oil. · Trim fat from meats before you cook them. · Drain off fat after you brown the meat or while you roast it. · Chill soups and stews after you cook them. Then skim the fat off the top after it hardens. Eating out · Order foods that are broiled or poached rather than fried or breaded. · Cut back on the amount of butter or margarine that you use on bread. · Order sauces, gravies, and salad dressings on the side, and use only a little. · When you order pasta, choose tomato sauce rather than cream sauce. · Ask for salsa with your baked potato instead of sour cream, butter, cheese, or hill. · Order meals in a small size instead of upgrading to a large. · Share an entree, or take part of your food home to eat as another meal. 
· Share appetizers and desserts. Where can you learn more? Go to http://sharon-lloyd.info/. Enter 99 [id number] in the search box to learn more about \"Learning About Cutting Calories. \" Current as of: May 12, 2017 Content Version: 11.7 © 5779-3886 [employer]. Care instructions adapted under license by [employer] (which disclaims liability or warranty for this information).  If you have questions about a medical condition or this instruction, always ask your healthcare professional. [employer] disclaims any warranty or liability for your use of this information. Learning About Cutting Calories How do calories affect your weight? Food gives your body energy. Energy from the food you eat is measured in calories. This energy keeps your heart beating, your brain active, and your muscles working. Your body needs a certain number of calories each day. After your body uses the calories it needs, it stores extra calories as fat. To lose weight safely, you have to eat fewer calories while eating in a healthy way. How many calories do you need each day? The more active you are, the more calories you need. When you are less active, you need fewer calories. How many calories you need each day also depends on several things, including your age and whether you are male or female. Here are some general guidelines for adults: 
· Less active women and older adults need 1,600 to 2,000 calories each day. · Active women and less active men need 2,000 to 2,400 calories each day. · Active men need 2,400 to 3,000 calories each day. How can you cut calories and eat healthy meals? Whole grains, vegetables and fruits, and dried beans are good lower-calorie foods. They give you lots of nutrients and fiber. And they fill you up. Sweets, energy drinks, and soda pop are high in calories. They give you few nutrients and no fiber. Try to limit soda pop, fruit juice, and energy drinks. Drink water instead. Some fats can be part of a healthy diet. But cutting back on fats from highly processed foods like fast foods and many snack foods is a good way to lower the calories in your diet. Also, use smaller amounts of fats like butter, margarine, salad dressing, and mayonnaise. Add fresh garlic, lemon, or herbs to your meals to add flavor without adding fat. Meats and dairy products can be a big source of hidden fats. Try to choose lean or low-fat versions of these products.  
Fat-free cookies, candies, chips, and frozen treats can still be high in sugar and calories. Some fat-free foods have more calories than regular ones. Eat fat-free treats in moderation, as you would other foods. If your favorite foods are high in fat, salt, sugar, or calories, limit how often you eat them. Eat smaller servings, or look for healthy substitutes. Fill up on fruits, vegetables, and whole grains. Eating at home · Use meat as a side dish instead of as the main part of your meal. 
· Try main dishes that use whole wheat pasta, brown rice, dried beans, or vegetables. · Find ways to cook with little or no fat, such as broiling, steaming, or grilling. · Use cooking spray instead of oil. If you use oil, use a monounsaturated oil, such as canola or olive oil. · Trim fat from meats before you cook them. · Drain off fat after you brown the meat or while you roast it. · Chill soups and stews after you cook them. Then skim the fat off the top after it hardens. Eating out · Order foods that are broiled or poached rather than fried or breaded. · Cut back on the amount of butter or margarine that you use on bread. · Order sauces, gravies, and salad dressings on the side, and use only a little. · When you order pasta, choose tomato sauce rather than cream sauce. · Ask for salsa with your baked potato instead of sour cream, butter, cheese, or hill. · Order meals in a small size instead of upgrading to a large. · Share an entree, or take part of your food home to eat as another meal. 
· Share appetizers and desserts. Where can you learn more? Go to http://sharon-lloyd.info/. Enter 99 [id number] in the search box to learn more about \"Learning About Cutting Calories. \" Current as of: May 12, 2017 Content Version: 11.7 © 9019-3770 [employer] Incorporated. Care instructions adapted under license by [employer]ro[employer]) (which disclaims liability or warranty for this information).  If you have questions about a medical condition or this instruction, always ask your healthcare professional. [address] any warranty or liability for your use of this information. Advance Directives: Care Instructions Your Care Instructions An advance directive is a legal way to state your wishes at the end of your life. It tells your family and your doctor what to do if you can no longer say what you want. There are two main types of advance directives. You can change them any time that your wishes change. · A living will tells your family and your doctor your wishes about life support and other treatment. · A durable power of  for health care lets you name a person to make treatment decisions for you when you can't speak for yourself. This person is called a health care agent. If you do not have an advance directive, decisions about your medical care may be made by a doctor or a  who doesn't know you. It may help to think of an advance directive as a gift to the people who care for you. If you have one, they won't have to make tough decisions by themselves. Follow-up care is a key part of your treatment and safety. Be sure to make and go to all appointments, and call your doctor if you are having problems. It's also a good idea to know your test results and keep a list of the medicines you take. How can you care for yourself at home? · Discuss your wishes with your loved ones and your doctor. This way, there are no surprises. · Many states have a unique form. Or you might use a universal form that has been approved by many states. This kind of form can sometimes be completed and stored online. Your electronic copy will then be available wherever you have a connection to the Internet. In most cases, doctors will respect your wishes even if you have a form from a different state. · You don't need a  to do an advance directive. But you may want to get legal advice.  
· Think about these questions when you prepare an advance directive: ¨ Who do you want to make decisions about your medical care if you are not able to? Many people choose a family member or close friend. ¨ Do you know enough about life support methods that might be used? If not, talk to your doctor so you understand. ¨ What are you most afraid of that might happen? You might be afraid of having pain, losing your independence, or being kept alive by machines. ¨ Where would you prefer to die? Choices include your home, a hospital, or a nursing home. ¨ Would you like to have information about hospice care to support you and your family? ¨ Do you want to donate organs when you die? ¨ Do you want certain [employer] practices performed before you die? If so, put your wishes in the advance directive. · Read your advance directive every year, and make changes as needed. When should you call for help? Be sure to contact your doctor if you have any questions. Where can you learn more? Go to http://sharon-lloyd.info/. Enter R264 in the search box to learn more about \"Advance Directives: Care Instructions. \" Current as of: October 6, 2017 Content Version: 11.7 © 6830-0471 [employer]. Care instructions adapted under license by [employer] (which disclaims liability or warranty for this information). If you have questions about a medical condition or this instruction, always ask your healthcare professional. [address] any warranty or liability for your use of this information.

## 2018-08-03 NOTE — PROGRESS NOTES
Marlena Vick is a 77 y.o. male presenting for Hypertension (6 mo fu) Ruben Figueroa 1. Have you been to the ER, urgent care clinic since your last visit? Hospitalized since your last visit? No 
 
2. Have you seen or consulted any other health care providers outside of the [address] [place] [address] since your last visit? Include any pap smears or colon screening. No 
 
Fall Risk Assessment, last 12 mths 8/3/2018 Able to walk? Yes Fall in past 12 months? -  
 
 
 
Abuse Screening Questionnaire 8/3/2018 Do you ever feel afraid of your partner? Dottie [place] Are you in a relationship with someone who physically or mentally threatens you? Dottie Fausto Is it safe for you to go home? Y  
 
 
PHQ over the last two weeks 8/3/2018 Little interest or pleasure in doing things Not at all Feeling down, depressed, irritable, or hopeless Not at all Total Score PHQ 2 0 There are no discontinued medications.

## 2018-08-03 NOTE — MR AVS SNAPSHOT
[address] [facility] [address] [address] [phone] Patient: Josephine Crockett 
MRN: [id number] IRT:8/8/5720 Visit Information Date & Time Provider Department Dept. Phone Encounter #  
 8/3/2018  8:00 AM Johanna Reeves MD [facility] [id number] Follow-up Instructions Return in about 6 months (around 2/3/2019). Your Appointments 11/20/2018  9:45 AM  
ESTABLISHED PATIENT with Zulma Santoyo MD  
[facility]) [address] [facility]  
[phone] [address] [facility] Upcoming Health Maintenance Date Due Hepatitis C Screening 1950 DTaP/Tdap/Td series (1 - Tdap) 1/8/1969 FOBT Q 1 YEAR AGE 50-75 1/8/1998 ZOSTER VACCINE AGE 60> 11/8/2007 GLAUCOMA SCREENING Q2Y 1/8/2013 Pneumococcal 65+ High/Highest Risk (2 of 2 - PPSV23) 3/28/2018 Influenza Age 5 to Adult 8/1/2018 Allergies as of 8/3/2018  Review Complete On: 8/3/2018 By: Johanna Reeves MD  
 No Known Allergies Current Immunizations  Never Reviewed No immunizations on file. Not reviewed this visit You Were Diagnosed With   
  
 Codes Comments Essential hypertension    -  Primary ICD-10-CM: I10 
ICD-9-CM: 401.9 Dyslipidemia     ICD-10-CM: E78.5 ICD-9-CM: 272.4 Gout, unspecified cause, unspecified chronicity, unspecified site     ICD-10-CM: M10.9 ICD-9-CM: 274.9 CKD (chronic kidney disease) stage 3, GFR 30-59 ml/min     ICD-10-CM: N18.3 ICD-9-CM: 024. 3 Pre-diabetes     ICD-10-CM: R73.03 
ICD-9-CM: 790.29 Vitals BP Pulse Height(growth percentile) Weight(growth percentile) SpO2 BMI  
 138/82 (BP 1 Location: Right arm, BP Patient Position: Sitting) 60 5' 6\" (1.676 m) 188 lb 3.2 oz (85.4 kg) 97% 30.38 kg/m2 Smoking Status Never Smoker BMI and BSA Data Body Mass Index Body Surface Area  
 30.38 kg/m 2 1.99 m 2 Preferred Pharmacy Pharmacy Name Phone [facility], [facility] [phone] Your Updated Medication List  
  
   
This list is accurate as of 8/3/18  8:24 AM.  Always use your most recent med list.  
  
  
  
  
 allopurinol 100 mg tablet Commonly known as:  Carlos Needle Take 2 Tabs by mouth daily. ascorbic acid (vitamin C) 500 mg tablet Commonly known as:  VITAMIN C Take 500 mg by mouth daily (with lunch). aspirin 325 mg tablet Commonly known as:  ASPIRIN Take 325 mg by mouth nightly. atenolol 25 mg tablet Commonly known as:  TENORMIN Take 12.5 mg by mouth daily. cholecalciferol 1,000 unit tablet Commonly known as:  VITAMIN D3 Take 500 Units by mouth two (2) times a day. COLCRYS 0.6 mg tablet Generic drug:  colchicine Take 1 Tab by mouth two (2) times a day. fenofibrate 160 mg tablet Commonly known as:  LOFIBRA Take 1 Tab by mouth daily (with lunch). FISH -160-1,000 mg Cap Generic drug:  omega 3-dha-epa-fish oil Take 1,000 mg by mouth daily (with lunch). lisinopril 2.5 mg tablet Commonly known as:  Price Mounika Take 1 Tab by mouth daily. Potassium Citrate Tber tablet Commonly known as:  [employer] Take 15 mEq by mouth daily (with lunch). vitamin E 400 unit capsule Commonly known as:  [address] Take 400 Units by mouth daily (with lunch). Follow-up Instructions Return in about 6 months (around 2/3/2019). Introducing [facility]! [employer] introduces "[employer] patient portal. Now you can access parts of your medical record, email your doctor's office, and request medication refills online. 1. In your internet browser, go to [email or web address] 2. Click on the First Time User? Click Here link in the Sign In box. You will see the New Member Sign Up page. 3. Enter your [employer] Access Code exactly as it appears below. You will not need to use this code after youve completed the sign-up process. If you do not sign up before the expiration date, you must request a new code. · [employer] Access Code: [email or web address] Expires: 8/16/2018  1:02 PM 
 
4. Enter the last four digits of your Social Security Number (xxxx) and Date of Birth (mm/dd/yyyy) as indicated and click Submit. You will be taken to the next sign-up page. 5. Create a [employer] ID. This will be your [employer] login ID and cannot be changed, so think of one that is secure and easy to remember. 6. Create a [employer] password. You can change your password at any time. 7. Enter your Password Reset Question and Answer. This can be used at a later time if you forget your password. 8. Enter your e-mail address. You will receive e-mail notification when new information is available in [address] 9. Click Sign Up. You can now view and download portions of your medical record. 10. Click the Download Summary menu link to download a portable copy of your medical information. If you have questions, please visit the Frequently Asked Questions section of the [employer] website. Remember, [employer] is NOT to be used for urgent needs. For medical emergencies, dial 911. Now available from your iPhone and Android! Please provide this summary of care documentation to your next provider. Your primary care clinician is listed as BOBY Pepe 21. If you have any questions after today's visit, please call [phone].

## 2018-08-03 NOTE — PROGRESS NOTES
This note will not be viewable in [address] Gino Miguel is a 77 y.o. male and presents with Hypertension (6 mo fu) Ariane Meza Subjective: 
Mr. Grabiel Francois returns to our office today in follow-up of multiple medical problems The patient has hypertension and remains on low-dose atenolol and lisinopril. He tolerates this without cough, swelling, rash, dizziness or lower extremity edema. He has had no headaches, numbness, tingling or focal neurological problems. He has fenofibrate that he takes for his dyslipidemia. He denies GI upset. There is no history of coronary artery disease and he denies exertional chest pains or claudication. There is a history of gout but he is currently on allopurinol. He has had no attacks. He is also finishing a course of colchicine for pericarditis and has had no recurrent chest pain. He has a history of prediabetes and his blood sugars have been running in the 110-115 range. He denies polyuria polydipsia or blurred vision. The patient also has chronic kidney disease stage III and is followed by Dr. Lindy Allen. His blood work was last done in May which has been stable and he is due to see Dr. Lnidy Allen again in the next month or 2. Past Medical History:  
Diagnosis Date  Acute idiopathic pericarditis 5/23/2018  Finding of rib structure   
 right lower rib cartilage weak  Hypertension  Other ill-defined conditions(799.89)   
 gout  Unspecified adverse effect of anesthesia   
 lays on back,chokes on drainage  Unspecified sleep apnea   
 septum surgery to fix, still an issue Past Surgical History:  
Procedure Laterality Date  HX KNEE ARTHROSCOPY    
 HX OTHER SURGICAL    
 kidney stones No Known Allergies Current Outpatient Prescriptions Medication Sig Dispense Refill  atenolol (TENORMIN) 25 mg tablet Take 12.5 mg by mouth daily.  allopurinol (ZYLOPRIM) 100 mg tablet Take 2 Tabs by mouth daily.  180 Tab 3  
 fenofibrate (LOFIBRA) 160 mg tablet Take 1 Tab by mouth daily (with lunch). 90 Tab 3  
 lisinopril (PRINIVIL, ZESTRIL) 2.5 mg tablet Take 1 Tab by mouth daily. 90 Tab 3  
 COLCRYS 0.6 mg tablet Take 1 Tab by mouth two (2) times a day. 60 Tab 3  
 Potassium Citrate (UROCIT-K) TbER tablet Take 15 mEq by mouth daily (with lunch).  vitamin E (AQUA GEMS) 400 unit capsule Take 400 Units by mouth daily (with lunch).  omega 3-dha-epa-fish oil (FISH OIL) 100-160-1,000 mg cap Take 1,000 mg by mouth daily (with lunch).  cholecalciferol (VITAMIN D3) 1,000 unit tablet Take 500 Units by mouth two (2) times a day.  ascorbic acid, vitamin C, (VITAMIN C) 500 mg tablet Take 500 mg by mouth daily (with lunch).  aspirin (ASPIRIN) 325 mg tablet Take 325 mg by mouth nightly. Social History Social History  Marital status:  Spouse name: N/A  
 Number of children: N/A  
 Years of education: N/A Social History Main Topics  Smoking status: Never Smoker  Smokeless tobacco: Never Used  Alcohol use No  
 Drug use: No  
 Sexual activity: Not Asked Other Topics Concern  None Social History Narrative Family History Problem Relation Age of Onset  Diabetes Mother  Hypertension Father Health Maintenance Topic Date Due  
 Hepatitis C Screening  1950  DTaP/Tdap/Td series (1 - Tdap) 01/08/1969  
 FOBT Q 1 YEAR AGE 50-75  01/08/1998  ZOSTER VACCINE AGE 60>  11/08/2007  GLAUCOMA SCREENING Q2Y  01/08/2013  Pneumococcal 65+ High/Highest Risk (2 of 2 - PPSV23) 03/28/2018  Influenza Age 5 to Adult  08/01/2018 Review of Systems Constitutional: negative for fevers, chills, anorexia and weight loss Eyes:   negative for visual disturbance and irritation ENT:   negative for tinnitus,sore throat,nasal congestion,ear pain,hoarseness Respiratory:  negative for cough, hemoptysis, dyspnea,wheezing CV:   negative for chest pain, palpitations, lower extremity edema GI:   negative for nausea, vomiting, diarrhea, abdominal pain,melena Endo:               negative for polyuria,polydipsia,polyphagia,heat intolerance Genitourinary: negative for frequency, dysuria and hematuria Integumentary: negative for rash and pruritus Hematologic:  negative for easy bruising and gum/nose bleeding Musculoskel: negative for myalgias, arthralgias, back pain, muscle weakness, joint pain Neurological:  negative for headaches, dizziness, vertigo, memory problems and gait Behavl/Psych: negative for feelings of anxiety, depression, mood changes ROS otherwise negative Objective: 
Visit Vitals  /82 (BP 1 Location: Right arm, BP Patient Position: Sitting)  Pulse 60  Ht 5' 6\" (1.676 m)  Wt 188 lb 3.2 oz (85.4 kg)  SpO2 97%  BMI 30.38 kg/m2 Body mass index is 30.38 kg/(m^2). Physical Exam:  
General appearance - alert, well appearing, and in no distress Mental status - alert, oriented to person, place, and time EYE-YOLIS, EOMI,conjunctiva normal bilaterally, lids normal 
ENT-ENT exam normal, no neck nodes or sinus tenderness Nose - normal and patent, no erythema,  Or discharge Mouth - mucous membranes moist, pharynx normal without lesions Neck - supple, no significant adenopathy or bruit Chest - clear to auscultation, no wheezes, rales or rhonchi. Heart - normal rate, regular rhythm, normal S1, S2, no murmurs, rubs, clicks or gallops Abdomen - soft, nontender, nondistended, no masses or organomegaly Lymph- no adenopathy palpable Ext-peripheral pulses normal, no pedal edema, no clubbing or cyanosis Skin-Warm and dry. no hyperpigmentation, vitiligo, or suspicious lesions Neuro -alert, oriented, normal speech, no focal findings or movement disorder noted Assessment/Plan: 
Diagnoses and all orders for this visit: 
 
Essential hypertension Dyslipidemia Gout, unspecified cause, unspecified chronicity, unspecified site CKD (chronic kidney disease) stage 3, GFR 30-59 ml/min Pre-diabetes Other instructions: The patient's medications are reviewed and reconciled. No change in his current medical regimen is made. Body mass index is 30.38 and dietary counseling along with printed patient education is given Advanced care planning discussion occurred today Labs from May 2018 were reviewed with the patient Follow-up 6 months Follow-up Disposition: 
Return in about 6 months (around 2/3/2019). I have reviewed with the patient details of the assessment and plan and all questions were answered. Relevent patient education was performed. The most recent lab findings were reviewed with the patient. An After Visit Summary was printed and given to the patient.  
 
Benigno Archer MD

## 2018-11-20 ENCOUNTER — OFFICE VISIT (OUTPATIENT)
Dept: CARDIOLOGY CLINIC | Age: 68
End: 2018-11-20

## 2018-11-20 VITALS
BODY MASS INDEX: 31 KG/M2 | WEIGHT: 192.9 LBS | DIASTOLIC BLOOD PRESSURE: 80 MMHG | RESPIRATION RATE: 16 BRPM | SYSTOLIC BLOOD PRESSURE: 140 MMHG | HEIGHT: 66 IN | OXYGEN SATURATION: 97 % | HEART RATE: 64 BPM

## 2018-11-20 DIAGNOSIS — I30.0 ACUTE IDIOPATHIC PERICARDITIS: ICD-10-CM

## 2018-11-20 DIAGNOSIS — I10 ESSENTIAL HYPERTENSION: Primary | ICD-10-CM

## 2018-11-20 DIAGNOSIS — E78.5 DYSLIPIDEMIA: ICD-10-CM

## 2018-11-20 RX ORDER — ASPIRIN 325 MG
325 TABLET ORAL
COMMUNITY
Start: 2018-11-20

## 2018-11-20 RX ORDER — ASPIRIN 81 MG/1
81 TABLET ORAL DAILY
COMMUNITY
Start: 2018-11-20 | End: 2018-11-20 | Stop reason: ALTCHOICE

## 2018-11-20 NOTE — PROGRESS NOTES
Jennifer Whittington DNP, ANP-BC Subjective/HPI:  
 
Bashir Boucher is a 77 y.o. male is here for routine f/u. The patient denies chest pain/ shortness of breath, orthopnea, PND, LE edema, palpitations, syncope, presyncope or fatigue. History of pericarditis diagnosed April of this year. Has completed a 3-month regimen of colchicine with resolution of symptoms. His last dose of colchicine was approximately 2 months ago. Initially was having GI upset with it and reduce the dose to daily, felt his pericarditis symptoms were returning and then was able to tolerate twice daily dosing of colchicine. PCP Provider Masood Schulte MD 
Past Medical History:  
Diagnosis Date  Acute idiopathic pericarditis 5/23/2018  Finding of rib structure   
 right lower rib cartilage weak  Hypertension  Other ill-defined conditions(799.89)   
 gout  Unspecified adverse effect of anesthesia   
 lays on back,chokes on drainage  Unspecified sleep apnea   
 septum surgery to fix, still an issue Past Surgical History:  
Procedure Laterality Date  HX KNEE ARTHROSCOPY    
 HX OTHER SURGICAL    
 kidney stones No Known Allergies Family History Problem Relation Age of Onset  Diabetes Mother  Hypertension Father Current Outpatient Medications Medication Sig  
 aspirin delayed-release 81 mg tablet Take 1 Tab by mouth daily.  atenolol (TENORMIN) 25 mg tablet Take 12.5 mg by mouth daily.  allopurinol (ZYLOPRIM) 100 mg tablet Take 2 Tabs by mouth daily.  fenofibrate (LOFIBRA) 160 mg tablet Take 1 Tab by mouth daily (with lunch).  lisinopril (PRINIVIL, ZESTRIL) 2.5 mg tablet Take 1 Tab by mouth daily.  COLCRYS 0.6 mg tablet Take 1 Tab by mouth two (2) times a day. (Patient taking differently: Take 0.6 mg by mouth two (2) times daily as needed.)  Potassium Citrate (UROCIT-K) TbER tablet Take 15 mEq by mouth daily (with lunch).  vitamin E (AQUA GEMS) 400 unit capsule Take 400 Units by mouth daily (with lunch).  omega 3-dha-epa-fish oil (FISH OIL) 100-160-1,000 mg cap Take 1,000 mg by mouth daily (with lunch).  cholecalciferol (VITAMIN D3) 1,000 unit tablet Take 500 Units by mouth two (2) times a day.  ascorbic acid, vitamin C, (VITAMIN C) 500 mg tablet Take 500 mg by mouth daily (with lunch). No current facility-administered medications for this visit. Vitals:  
 11/20/18 6982 BP: 140/80 Pulse: 64 Resp: 16 SpO2: 97% Weight: 192 lb 14.4 oz (87.5 kg) Height: 5' 6\" (1.676 m) Social History Socioeconomic History  Marital status:  Spouse name: Not on file  Number of children: Not on file  Years of education: Not on file  Highest education level: Not on file Social Needs  Financial resource strain: Not on file  Food insecurity - worry: Not on file  Food insecurity - inability: Not on file  Transportation needs - medical: Not on file  Transportation needs - non-medical: Not on file Occupational History  Not on file Tobacco Use  Smoking status: Never Smoker  Smokeless tobacco: Never Used Substance and Sexual Activity  Alcohol use: No  
 Drug use: No  
 Sexual activity: Not on file Other Topics Concern  Not on file Social History Narrative  Not on file I have reviewed the nurses notes, vitals, problem list, allergy list, medical history, family, social history and medications. Review of Symptoms: 
 
General: Pt denies excessive weight gain or loss. Pt is able to conduct ADL's HEENT: Denies blurred vision, headaches, epistaxis and difficulty swallowing. Respiratory: Denies shortness of breath, BACA, wheezing or stridor. Cardiovascular: Denies precordial pain, palpitations, edema or PND Gastrointestinal: Denies poor appetite, indigestion, abdominal pain or blood in stool Musculoskeletal: Denies pain or swelling from muscles or joints Neurologic: Denies tremor, paresthesias, or sensory motor disturbance Skin: Denies rash, itching or texture change. Physical Exam:   
 
General: Well developed, in no acute distress, cooperative and alert HEENT: No carotid bruits, no JVD, trach is midline. Neck Supple, PEERL, EOM intact. Heart:  Normal S1/S2 negative S3 or S4. Regular, no murmur, gallop or rub.  
Respiratory: Clear bilaterally x 4, no wheezing or rales Abdomen:   Soft, non-tender, no masses, bowel sounds are active.  
Extremities:  No edema, normal cap refill, no cyanosis, atraumatic. Neuro: A&Ox3, speech clear, gait stable. Skin: Skin color is normal. No rashes or lesions. Non diaphoretic Vascular: 2+ pulses symmetric in all extremities Cardiographics ECG: Sinus rhythm Results for orders placed or performed during the hospital encounter of 05/18/18 EKG, 12 LEAD, INITIAL Result Value Ref Range Ventricular Rate 81 BPM  
 Atrial Rate 81 BPM  
 P-R Interval 156 ms QRS Duration 90 ms Q-T Interval 362 ms QTC Calculation (Bezet) 420 ms Calculated P Axis 53 degrees Calculated R Axis -3 degrees Calculated T Axis 27 degrees Diagnosis Normal sinus rhythm Loss of anteroseptal forces Nonspecific ST and T wave abnormality When compared with ECG of 12-MAY-2010 09:44, No significant change was found Confirmed by Jonathan Khan ([id number]) on 5/18/2018 5:52:21 PM 
  
 
 
 
Cardiology Labs: 
Lab Results Component Value Date/Time Cholesterol, total 150 05/19/2018 03:33 AM  
 HDL Cholesterol 31 05/19/2018 03:33 AM  
 LDL, calculated 84.2 05/19/2018 03:33 AM  
 Triglyceride 174 (H) 05/19/2018 03:33 AM  
 CHOL/HDL Ratio 4.8 05/19/2018 03:33 AM  
 
 
Lab Results Component Value Date/Time  Sodium 140 05/20/2018 05:01 AM  
 Potassium 4.2 05/20/2018 05:01 AM  
 Chloride 110 (H) 05/20/2018 05:01 AM  
 CO2 22 05/20/2018 05:01 AM  
 Anion gap 8 05/20/2018 05:01 AM  
 Glucose 116 (H) 05/20/2018 05:01 AM  
 BUN 34 (H) 05/20/2018 05:01 AM  
 Creatinine 2.25 (H) 05/20/2018 05:01 AM  
 BUN/Creatinine ratio 15 05/20/2018 05:01 AM  
 GFR est AA 35 (L) 05/20/2018 05:01 AM  
 GFR est non-AA 29 (L) 05/20/2018 05:01 AM  
 Calcium 9.5 05/20/2018 05:01 AM  
 Bilirubin, total 0.9 05/19/2018 03:33 AM  
 AST (SGOT) 16 05/19/2018 03:33 AM  
 Alk. phosphatase 39 (L) 05/19/2018 03:33 AM  
 Protein, total 7.2 05/19/2018 03:33 AM  
 Albumin 3.6 05/19/2018 03:33 AM  
 Globulin 3.6 05/19/2018 03:33 AM  
 A-G Ratio 1.0 (L) 05/19/2018 03:33 AM  
 ALT (SGPT) 24 05/19/2018 03:33 AM  
  
 
 
 Assessment: 
 
 Assessment:  
 
Diagnoses and all orders for this visit: 1. Essential hypertension -     AMB POC EKG ROUTINE W/ 12 LEADS, INTER & REP 2. Dyslipidemia 3. Acute idiopathic pericarditis ICD-10-CM ICD-9-CM 1. Essential hypertension I10 401.9 AMB POC EKG ROUTINE W/ 12 LEADS, INTER & REP 2. Dyslipidemia E78.5 272.4 3. Acute idiopathic pericarditis I30.0 420.91 Orders Placed This Encounter  AMB POC EKG ROUTINE W/ 12 LEADS, INTER & REP Order Specific Question:   Reason for Exam: Answer:   routine  aspirin delayed-release 81 mg tablet Sig: Take 1 Tab by mouth daily. Plan: 1. History of idiopathic pericarditis: Resolved, EKG stable, patient will continue enteric-coated 325 mg aspirin. 2.  Hyperlipidemia: LDL 84, on fenofibrate. 3.  Hypertension: Mildly elevated working on diet exercise and weight loss Moira Valle NP This note was created using voice recognition software. Despite editing, there may be syntax errors. Patient seen and examined by me with nurse practitioner. Lilia Waller personally performed all components of the history, physical, and medical decision making and agree with the assessment and plan with minor modifications as noted. Last FLP reviewed. D/w pt.   
 
Elisa He MD

## 2018-11-20 NOTE — PROGRESS NOTES
1. Have you been to the ER, urgent care clinic since your last visit? Hospitalized since your last visit? No 
 
2. Have you seen or consulted any other health care providers outside of the [address] [place] [address] since your last visit? Include any pap smears or colon screening. Yes 10-18 Dentist 
 
Patient has no cardiac complaints.

## 2019-03-05 ENCOUNTER — OFFICE VISIT (OUTPATIENT)
Dept: INTERNAL MEDICINE CLINIC | Age: 69
End: 2019-03-05

## 2019-03-05 VITALS
HEIGHT: 66 IN | HEART RATE: 58 BPM | WEIGHT: 192.2 LBS | RESPIRATION RATE: 19 BRPM | BODY MASS INDEX: 30.89 KG/M2 | TEMPERATURE: 97.7 F | DIASTOLIC BLOOD PRESSURE: 80 MMHG | SYSTOLIC BLOOD PRESSURE: 130 MMHG | OXYGEN SATURATION: 97 %

## 2019-03-05 DIAGNOSIS — N18.30 CKD (CHRONIC KIDNEY DISEASE) STAGE 3, GFR 30-59 ML/MIN (HCC): ICD-10-CM

## 2019-03-05 DIAGNOSIS — Z11.59 NEED FOR HEPATITIS C SCREENING TEST: ICD-10-CM

## 2019-03-05 DIAGNOSIS — R73.03 PRE-DIABETES: ICD-10-CM

## 2019-03-05 DIAGNOSIS — I10 ESSENTIAL HYPERTENSION: ICD-10-CM

## 2019-03-05 DIAGNOSIS — M10.9 GOUT, UNSPECIFIED CAUSE, UNSPECIFIED CHRONICITY, UNSPECIFIED SITE: ICD-10-CM

## 2019-03-05 DIAGNOSIS — E78.5 DYSLIPIDEMIA: ICD-10-CM

## 2019-03-05 DIAGNOSIS — Z00.00 INITIAL MEDICARE ANNUAL WELLNESS VISIT: Primary | ICD-10-CM

## 2019-03-05 LAB
A-G RATIO,AGRAT: 1.4 RATIO
ALBUMIN SERPL-MCNC: 4.4 G/DL (ref 3.9–5.4)
ALP SERPL-CCNC: 54 U/L (ref 38–126)
ALT SERPL-CCNC: 32 U/L (ref 9–52)
ANION GAP SERPL CALC-SCNC: 10 MMOL/L
AST SERPL W P-5'-P-CCNC: 32 U/L (ref 14–36)
BILIRUB SERPL-MCNC: 0.8 MG/DL (ref 0.2–1.3)
BILIRUB UR QL: NEGATIVE
BUN SERPL-MCNC: 33 MG/DL (ref 9–20)
BUN/CREATININE RATIO,BUCR: 17 RATIO
CALCIUM SERPL-MCNC: 10.1 MG/DL (ref 8.4–10.2)
CHLORIDE SERPL-SCNC: 106 MMOL/L (ref 98–107)
CHOL/HDL RATIO,CHHD: 5 RATIO (ref 0–4)
CHOLEST SERPL-MCNC: 161 MG/DL (ref 0–200)
CLARITY: CLEAR
CO2 SERPL-SCNC: 24 MMOL/L (ref 22–32)
COLOR UR: ABNORMAL
CREAT SERPL-MCNC: 1.9 MG/DL (ref 0.8–1.5)
ERYTHROCYTE [DISTWIDTH] IN BLOOD BY AUTOMATED COUNT: 13.1 %
GLOBULIN,GLOB: 3.2
GLUCOSE 24H UR-MRATE: NEGATIVE G/(24.H)
GLUCOSE SERPL-MCNC: 92 MG/DL (ref 75–110)
HCT VFR BLD AUTO: 43 % (ref 37–51)
HDLC SERPL-MCNC: 30 MG/DL (ref 35–130)
HGB BLD-MCNC: 14.5 G/DL (ref 12–18)
HGB UR QL STRIP: NEGATIVE
KETONES UR QL STRIP.AUTO: NEGATIVE
LDL/HDL RATIO,LDHD: 2 RATIO
LDLC SERPL CALC-MCNC: 75 MG/DL (ref 0–130)
LEUKOCYTE ESTERASE: NEGATIVE
LYMPHOCYTES ABSOLUTE: 2.1 K/UL (ref 0.6–4.1)
LYMPHOCYTES NFR BLD: 36.4 % (ref 10–58.5)
MCH RBC QN AUTO: 34.5 PG (ref 26–32)
MCHC RBC AUTO-ENTMCNC: 33.7 G/DL (ref 30–36)
MCV RBC AUTO: 102.3 FL (ref 80–97)
MICROALBUMIN, URINE: NEGATIVE MG/L (ref 0–20)
MONOCYTES ABS-DIF,2141: 0.6 K/UL (ref 0–1.8)
MONOCYTES NFR BLD: 11.1 % (ref 0.1–24)
NEUTROPHILS # BLD: 52.5 % (ref 37–92)
NEUTROPHILS ABS,2156: 3 K/UL (ref 2–7.8)
NITRITE UR QL STRIP.AUTO: NEGATIVE
PH UR STRIP: 6 [PH] (ref 5–7)
PLATELET # BLD AUTO: 207 K/UL (ref 140–440)
PMV BLD AUTO: 8.4 FL
POTASSIUM SERPL-SCNC: 4.9 MMOL/L (ref 3.6–5)
PROT SERPL-MCNC: 7.6 G/DL (ref 6.3–8.2)
PROT UR STRIP-MCNC: NEGATIVE MG/DL
RBC # BLD AUTO: 4.2 M/UL (ref 4.2–6.3)
RBC #/AREA URNS HPF: 0 #/HPF
SODIUM SERPL-SCNC: 140 MMOL/L (ref 137–145)
SP GR UR REFRACTOMETRY: 1.01 (ref 1–1.03)
SQUAMOUS EPITHELIAL CELLS: ABNORMAL
TRIGL SERPL-MCNC: 281 MG/DL (ref 0–200)
TSH SERPL DL<=0.05 MIU/L-ACNC: 1.34 UIU/ML (ref 0.34–5.6)
URATE SERPL-MCNC: 5.7 MG/DL (ref 3.5–8.5)
UROBILINOGEN UR QL STRIP.AUTO: NEGATIVE
VLDLC SERPL CALC-MCNC: 56 MG/DL
WBC # BLD AUTO: 5.8 K/UL (ref 4.1–10.9)
WBC URNS QL MICRO: 0 #/HPF

## 2019-03-05 NOTE — PATIENT INSTRUCTIONS
The best way to stay healthy is to live a healthy lifestyle. A healthy lifestyle includes regular exercise, eating a well-balanced diet, keeping a healthy weight and not smoking. Regular physical exams and screening tests are another important way to take care of yourself. Preventive exams provided by health care providers can find health problems early when treatment works best and can keep you from getting certain diseases or illnesses. Preventive services include exams, lab tests, screenings, shots, monitoring and information to help you take care of your own health. All people over 65 should have a pneumonia shot. Pneumonia shots are usually only needed once in a lifetime unless your doctor decides differently. In addition to your physical exam, some screening tests are recommended:    All people over 65 should have a yearly flu shot. People over 65 are at medium to high risk for Hepatitis B. Three shots are needed for complete protection. Bone mass measurement (dexa scan) is recommended every two years. Diabetes Mellitus screening is recommended every year. Glaucoma is an eye disease caused by high pressure in the eye. An eye exam is recommended every year. Cardiovascular screening tests that check your cholesterol and other blood fat (lipid) levels are recommended every five years. Colorectal Cancer screening tests help to find pre-cancerous polyps (growths in the colon) so they can be removed before they turn into cancer. Tests ordered for screening depend on your personal and family history risk factors. Prostate Cancer Screening (annually up to age 76)    Screening for breast cancer is recommended yearly with a Mammogram.    Screening for cervical and vaginal cancer is recommended with a pelvic and Pap test every two years.  However if you have had an abnormal pap in the past  three years or at high risk for cervical or vaginal cancer Medicare will cover a pap test and a pelvic exam every year. Here is a list of your current Health Maintenance items with a due date:  Health Maintenance Due   Topic Date Due    Hepatitis C Test  1950    DTaP/Tdap/Td  (1 - Tdap) 01/08/1969    Shingles Vaccine (1 of 2) 01/08/1998    Stool testing for trace blood  01/08/1998    Glaucoma Screening   01/08/2013    Pneumococcal Vaccine (1 of 2 - PCV13) 01/08/2013    Annual Well Visit  08/03/2018          Learning About Cutting Calories  How do calories affect your weight? Food gives your body energy. Energy from the food you eat is measured in calories. This energy keeps your heart beating, your brain active, and your muscles working. Your body needs a certain number of calories each day. After your body uses the calories it needs, it stores extra calories as fat. To lose weight safely, you have to eat fewer calories while eating in a healthy way. How many calories do you need each day? The more active you are, the more calories you need. When you are less active, you need fewer calories. How many calories you need each day also depends on several things, including your age and whether you are male or female. Here are some general guidelines for adults:  · Less active women and older adults need 1,600 to 2,000 calories each day. · Active women and less active men need 2,000 to 2,400 calories each day. · Active men need 2,400 to 3,000 calories each day. How can you cut calories and eat healthy meals? Whole grains, vegetables and fruits, and dried beans are good lower-calorie foods. They give you lots of nutrients and fiber. And they fill you up. Sweets, energy drinks, and soda pop are high in calories. They give you few nutrients and no fiber. Try to limit soda pop, fruit juice, and energy drinks. Drink water instead. Some fats can be part of a healthy diet.  But cutting back on fats from highly processed foods like fast foods and many snack foods is a good way to lower the calories in your diet. Also, use smaller amounts of fats like butter, margarine, salad dressing, and mayonnaise. Add fresh garlic, lemon, or herbs to your meals to add flavor without adding fat. Meats and dairy products can be a big source of hidden fats. Try to choose lean or low-fat versions of these products. Fat-free cookies, candies, chips, and frozen treats can still be high in sugar and calories. Some fat-free foods have more calories than regular ones. Eat fat-free treats in moderation, as you would other foods. If your favorite foods are high in fat, salt, sugar, or calories, limit how often you eat them. Eat smaller servings, or look for healthy substitutes. Fill up on fruits, vegetables, and whole grains. Eating at home  · Use meat as a side dish instead of as the main part of your meal.  · Try main dishes that use whole wheat pasta, brown rice, dried beans, or vegetables. · Find ways to cook with little or no fat, such as broiling, steaming, or grilling. · Use cooking spray instead of oil. If you use oil, use a monounsaturated oil, such as canola or olive oil. · Trim fat from meats before you cook them. · Drain off fat after you brown the meat or while you roast it. · Chill soups and stews after you cook them. Then skim the fat off the top after it hardens. Eating out  · Order foods that are broiled or poached rather than fried or breaded. · Cut back on the amount of butter or margarine that you use on bread. · Order sauces, gravies, and salad dressings on the side, and use only a little. · When you order pasta, choose tomato sauce rather than cream sauce. · Ask for salsa with your baked potato instead of sour cream, butter, cheese, or hill. · Order meals in a small size instead of upgrading to a large. · Share an entree, or take part of your food home to eat as another meal.  · Share appetizers and desserts. Where can you learn more?   Go to http://mario.info/. Enter 99 [id number] in the search box to learn more about \"Learning About Cutting Calories. \"  Current as of: March 28, 2018  Content Version: 11.9  © 5485-9481 [employer]. Care instructions adapted under license by [email or web address] (which disclaims liability or warranty for this information). If you have questions about a medical condition or this instruction, always ask your healthcare professional. [place] [address] any warranty or liability for your use of this information.

## 2019-03-05 NOTE — PROGRESS NOTES
Chief Complaint   Patient presents with    Hypertension     6 mo fu    Annual Wellness Visit       Depression Risk Factor Screening:     3 most recent PHQ Screens 3/5/2019   Little interest or pleasure in doing things Not at all   Feeling down, depressed, irritable, or hopeless Not at all   Total Score PHQ 2 0       Functional Ability and Level of Safety:     Activities of Daily Living  ADL Assessment 3/5/2019   Feeding yourself No Help Needed   Getting from bed to chair No Help Needed   Getting dressed No Help Needed   Bathing or showering No Help Needed   Walk across the room (includes cane/walker) No Help Needed   Using the telphone No Help Needed   Taking your medications No Help Needed   Preparing meals No Help Needed   Managing money (expenses/bills) No Help Needed   Moderately strenuous housework (laundry) No Help Needed   Shopping for personal items (toiletries/medicines) No Help Needed   Shopping for groceries No Help Needed   Driving No Help Needed   Climbing a flight of stairs No Help Needed   Getting to places beyond walking distances No Help Needed       Fall Risk  Fall Risk Assessment, last 12 mths 3/5/2019   Able to walk? Yes   Fall in past 12 months? No       Abuse Screen  Abuse Screening Questionnaire 3/5/2019   Do you ever feel afraid of your partner? N   Are you in a relationship with someone who physically or mentally threatens you? N   Is it safe for you to go home?  Y         Patient Care Team   Patient Care Team:  Marco Grewal MD as PCP - General (Internal Medicine)  Diamante Johnson MD as Physician (Cardiology)  Jin Flaherty MD (Nephrology)  Parag Falcon MD (Urology)

## 2019-03-05 NOTE — PROGRESS NOTES
This note will not be viewable in [address] Genesis Land is a 68 y.o. male who presents for an Initial Medicare Annual Wellness Exam (AWV) and follow up of chronic medical conditions. The patient has not had a Medicare wellness exam done within the last year    I have reviewed the patient's medical history in detail and updated the computerized patient record. History     Subjective:  Mr. Fisher Members resents to the office today for Medicare wellness check and follow-up of multiple medical problems. The patient has a history of prediabetes. He currently is not on any medication for this but follows a prudent diet and exercises with weight lifting and walking. He denies polyuria, polydipsia or blurred vision. His hypertension is currently managed on atenolol, Prinivil. The patient denies fatigue or palpitations, cough, swelling, rash. He has had no headaches, numbness, tingling or focal neurological problems. He has a history of gout currently on allopurinol with as needed colchicine to be taken on a as needed basis. He has had no gout attacks or kidney stones within the last couple of years. His chronic kidney disease is currently being followed by nephrology and is been stable. He go to us for routine follow-ups and is due for follow-up labs today. His dyslipidemia is currently managed on fenofibrate. He has no history of coronary artery disease and denies exertional chest pains or claudication.     Patient Active Problem List   Diagnosis Code    Hypertension I10    CKD (chronic kidney disease) stage 3, GFR 30-59 ml/min ([facility]) N18.3    Dyslipidemia E78.5    Gout M10.9    Pre-diabetes R73.03    Recurrent kidney stones N20.0    Chest pain R07.9    Acute idiopathic pericarditis I30.0       Patient Care Team:  Sharon Francis MD as PCP - General (Internal Medicine)  Rueben Mohs, MD as Physician (Cardiology)  Ghanshyam Abreu MD (Nephrology)  Ruth Elena MD (Urology)    Past Medical History:   Diagnosis Date    Acute idiopathic pericarditis 5/23/2018    Finding of rib structure     right lower rib cartilage weak    Hypertension     Other ill-defined conditions(799.89)     gout    Unspecified adverse effect of anesthesia     lays on back,chokes on drainage    Unspecified sleep apnea     septum surgery to fix, still an issue     Past Surgical History:   Procedure Laterality Date    HX KNEE ARTHROSCOPY      HX OTHER SURGICAL      kidney stones     No Known Allergies  Current Outpatient Medications   Medication Sig Dispense Refill    aspirin (ASPIRIN) 325 mg tablet Take 1 Tab by mouth nightly.  atenolol (TENORMIN) 25 mg tablet Take 12.5 mg by mouth daily.  allopurinol (ZYLOPRIM) 100 mg tablet Take 2 Tabs by mouth daily. 180 Tab 3    fenofibrate (LOFIBRA) 160 mg tablet Take 1 Tab by mouth daily (with lunch). 90 Tab 3    lisinopril (PRINIVIL, ZESTRIL) 2.5 mg tablet Take 1 Tab by mouth daily. 90 Tab 3    COLCRYS 0.6 mg tablet Take 1 Tab by mouth two (2) times a day. (Patient taking differently: Take 0.6 mg by mouth two (2) times daily as needed.) 60 Tab 3    Potassium Citrate (UROCIT-K) TbER tablet Take 15 mEq by mouth daily (with lunch).  vitamin E (AQUA GEMS) 400 unit capsule Take 400 Units by mouth daily (with lunch).  omega 3-dha-epa-fish oil (FISH OIL) 100-160-1,000 mg cap Take 1,000 mg by mouth daily (with lunch).  cholecalciferol (VITAMIN D3) 1,000 unit tablet Take 500 Units by mouth two (2) times a day.  ascorbic acid, vitamin C, (VITAMIN C) 500 mg tablet Take 500 mg by mouth daily (with lunch).        Social History     Socioeconomic History    Marital status:      Spouse name: Not on file    Number of children: Not on file    Years of education: Not on file    Highest education level: Not on file   Tobacco Use    Smoking status: Never Smoker    Smokeless tobacco: Never Used   Substance and Sexual Activity    Alcohol use: No    Drug use: No     Family History   Problem Relation Age of Onset    Diabetes Mother     Hypertension Father      Health Maintenance   Topic Date Due    Hepatitis C Screening  1950    FOBT Q 1 YEAR AGE 50-75  01/08/1998    GLAUCOMA SCREENING Q2Y  01/08/2013    MEDICARE YEARLY EXAM  08/03/2018    Shingrix Vaccine Age 50> (1 of 2) 06/10/2019 (Originally 1/8/1998)    Influenza Age 5 to Adult  08/17/2019 (Originally 8/1/2018)    Pneumococcal 65+ Low/Medium Risk (1 of 2 - PCV13) 03/05/2020 (Originally 1/8/2013)    DTaP/Tdap/Td series (1 - Tdap) 03/05/2020 (Originally 1/8/1969)          Review of Systems  Constitutional: negative for fevers, chills, anorexia and weight loss  Eyes:   negative for visual disturbance and irritation  ENT:   negative for tinnitus,sore throat,nasal congestion,ear pain,hoarseness  Respiratory:  negative for cough, hemoptysis, dyspnea,wheezing  CV:   negative for chest pain, palpitations, lower extremity edema  GI:   negative for nausea, vomiting, diarrhea, abdominal pain,melena  Endo:               negative for polyuria,polydipsia,polyphagia,heat intolerance  Genitourinary: negative for frequency, dysuria and hematuria  Integumentary: negative for rash and pruritus  Hematologic:  negative for easy bruising and gum/nose bleeding  Musculoskel: negative for myalgias, arthralgias, back pain, muscle weakness, joint pain  Neurological:  negative for headaches, dizziness, vertigo, memory problems and gait   Behavl/Psych: negative for feelings of anxiety, depression, mood changes  ROS otherwise negative      Depression Risk Factor Screening:     3 most recent PHQ Screens 3/5/2019   Little interest or pleasure in doing things Not at all   Feeling down, depressed, irritable, or hopeless Not at all   Total Score PHQ 2 0       Alcohol Risk Factor Screening: You do not drink alcohol or very rarely.     Functional Ability and Level of Safety:   Hearing Loss  Hearing is good.    Activities of Daily Living  ADL Assessment 3/5/2019   Feeding yourself No Help Needed   Getting from bed to chair No Help Needed   Getting dressed No Help Needed   Bathing or showering No Help Needed   Walk across the room (includes cane/walker) No Help Needed   Using the telphone No Help Needed   Taking your medications No Help Needed   Preparing meals No Help Needed   Managing money (expenses/bills) No Help Needed   Moderately strenuous housework (laundry) No Help Needed   Shopping for personal items (toiletries/medicines) No Help Needed   Shopping for groceries No Help Needed   Driving No Help Needed   Climbing a flight of stairs No Help Needed   Getting to places beyond walking distances No Help Needed       Fall Risk  Fall Risk Assessment, last 12 mths 3/5/2019   Able to walk? Yes   Fall in past 12 months? No       Abuse Screen  Abuse Screening Questionnaire 3/5/2019   Do you ever feel afraid of your partner? N   Are you in a relationship with someone who physically or mentally threatens you? N   Is it safe for you to go home? Y       Cognitive Screening   Evaluation of Cognitive Function:  Has your family/caregiver stated any concerns about your memory: no    Physical Exam     Visit Vitals  /80 (BP 1 Location: Right arm, BP Patient Position: Sitting)   Pulse (!) 58   Temp 97.7 °F (36.5 °C) (Oral)   Resp 19   Ht 5' 6\" (1.676 m)   Wt 192 lb 3.2 oz (87.2 kg)   SpO2 97%   BMI 31.02 kg/m²     Body mass index is 31.02 kg/m². General appearance - alert, well appearing, and in no distress  Mental status - alert, oriented to person, place, and time  EYE-YOLIS, EOMI,conjunctiva normal bilaterally, lids normal  ENT-ENT exam normal, no neck nodes or sinus tenderness  Nose - normal and patent, no erythema,  Or discharge   Mouth - mucous membranes moist, pharynx normal without lesions  Neck - supple, no significant adenopathy or bruit  Chest - clear to auscultation, no wheezes, rales or rhonchi.   Heart - normal rate, regular rhythm, normal S1, S2, no murmurs, rubs, clicks or gallops   Abdomen - soft, nontender, nondistended, no masses or organomegaly  Lymph- no adenopathy palpable  Ext-peripheral pulses normal, no pedal edema, no clubbing or cyanosis  Skin-Warm and dry. no hyperpigmentation, vitiligo, or suspicious lesions  Neuro -alert, oriented, normal speech, no focal findings or movement disorder noted    Assessment/Plan   [facility] education and counseling provided:  Age appropriate evidence-based preventive care recommendations based on today's review and evaluation; including relevant cancer screening guidelines, and vaccination recommendations. An After Visit Summary was printed and given to the patient which information about these guidelines, and a personalized schedule for health maintenance items. Whe appropriate and with patient agreement, orders noted below were placed to complete missing health maintenance items. Additional Plan for follow up chronic medical conditions includes:  Diagnoses and all orders for this visit:    Initial Medicare annual wellness visit    Essential hypertension  -     COLLECTION VENOUS BLOOD,VENIPUNCTURE  -     CBC WITH AUTOMATED DIFF  -     METABOLIC PANEL, COMPREHENSIVE  -     URINALYSIS W/MICROSCOPIC    Dyslipidemia  -     LIPID PANEL  -     TSH 3RD GENERATION    Gout, unspecified cause, unspecified chronicity, unspecified site  -     URIC ACID    CKD (chronic kidney disease) stage 3, GFR 30-59 ml/min ([facility])    Pre-diabetes  -     HEMOGLOBIN A1C W/O EAG  -     URINE, MICROALBUMIN, SEMIQUANTITATIVE    Need for hepatitis C screening test  -     HEPATITIS C AB          Other instructions: The patient's medications were reviewed and reconciled. No change in his current medical regimen is made.     Body mass index is 31 and dietary counseling along with printed patient education is given    Health maintenance issues were reviewed and CDC recommended hepatitis C screening will be performed. He may be due for colonoscopy but is not quite sure but thinks that he may be contacted within the next year to have this performed. He will contact his optometrist for glaucoma screening. Age-appropriate vaccinations including influenza, pneumonia, Tdap and shingles were discussed with the patient and all are declined. Await results of multiple labs    Follow-up 6 months    Follow-up Disposition:  Return in about 6 months (around 9/5/2019). I have reviewed with the patient details of the assessment and plan and all questions were answered. Relevent patient education was performed. The most recent lab findings were reviewed with the patient.     Winifred Burton MD

## 2019-03-06 ENCOUNTER — TELEPHONE (OUTPATIENT)
Dept: INTERNAL MEDICINE CLINIC | Age: 69
End: 2019-03-06

## 2019-03-06 LAB
HBA1C MFR BLD: 7.2 % (ref 4.8–5.6)
HCV AB S/CO SERPL IA: <0.1 S/CO RATIO (ref 0–0.9)
URATE SERPL-MCNC: 5.5 MG/DL (ref 3.7–8.6)

## 2019-03-06 RX ORDER — GLIMEPIRIDE 2 MG/1
TABLET ORAL
Qty: 30 TAB | Refills: 1 | Status: SHIPPED | OUTPATIENT
Start: 2019-03-06 | End: 2020-07-21 | Stop reason: ALTCHOICE

## 2019-03-06 NOTE — PROGRESS NOTES
Labs were stable except his hemoglobin A1c has jumped from 6.4 up to 7.2 which correlates with an average blood sugar of 160 over the last 3 months. Start Amaryl 2 mg 1/2 tablet every morning.   Make sure he understands that he is not to take this medication unless he eats breakfast  Recheck fasting blood sugar in 1 week

## 2019-03-06 NOTE — TELEPHONE ENCOUNTER
Requested Prescriptions     Pending Prescriptions Disp Refills    glimepiride (AMARYL) 2 mg tablet 30 Tab 1     Sig: Take 1/2 tablet qam with breakfast

## 2019-03-06 NOTE — TELEPHONE ENCOUNTER
----- Message from Winifred Burton MD sent at 3/6/2019 10:57 AM EST -----  Labs were stable except his hemoglobin A1c has jumped from 6.4 up to 7.2 which correlates with an average blood sugar of 160 over the last 3 months. Start Amaryl 2 mg 1/2 tablet every morning.   Make sure he understands that he is not to take this medication unless he eats breakfast  Recheck fasting blood sugar in 1 week

## 2019-03-13 ENCOUNTER — LAB ONLY (OUTPATIENT)
Dept: INTERNAL MEDICINE CLINIC | Age: 69
End: 2019-03-13

## 2019-03-13 DIAGNOSIS — R73.03 PRE-DIABETES: Primary | ICD-10-CM

## 2019-03-13 LAB — GLUCOSE SERPL-MCNC: 95 MG/DL (ref 75–110)

## 2019-09-18 ENCOUNTER — OFFICE VISIT (OUTPATIENT)
Dept: INTERNAL MEDICINE CLINIC | Age: 69
End: 2019-09-18

## 2019-09-18 VITALS
TEMPERATURE: 97.6 F | HEART RATE: 70 BPM | BODY MASS INDEX: 29.67 KG/M2 | OXYGEN SATURATION: 98 % | WEIGHT: 184.6 LBS | DIASTOLIC BLOOD PRESSURE: 70 MMHG | RESPIRATION RATE: 18 BRPM | HEIGHT: 66 IN | SYSTOLIC BLOOD PRESSURE: 122 MMHG

## 2019-09-18 DIAGNOSIS — E78.5 DYSLIPIDEMIA: Chronic | ICD-10-CM

## 2019-09-18 DIAGNOSIS — I10 ESSENTIAL HYPERTENSION: Chronic | ICD-10-CM

## 2019-09-18 DIAGNOSIS — E11.21 TYPE II DIABETES MELLITUS WITH NEPHROPATHY (HCC): Primary | Chronic | ICD-10-CM

## 2019-09-18 DIAGNOSIS — N18.30 CKD (CHRONIC KIDNEY DISEASE) STAGE 3, GFR 30-59 ML/MIN (HCC): Chronic | ICD-10-CM

## 2019-09-18 PROBLEM — R73.03 PRE-DIABETES: Chronic | Status: ACTIVE | Noted: 2017-07-23

## 2019-09-18 PROBLEM — R73.03 PRE-DIABETES: Chronic | Status: RESOLVED | Noted: 2017-07-23 | Resolved: 2019-09-18

## 2019-09-18 LAB
BILIRUB UR QL: NEGATIVE
CHOL/HDL RATIO,CHHD: 6 RATIO (ref 0–4)
CHOLEST SERPL-MCNC: 189 MG/DL (ref 0–200)
CLARITY: CLEAR
COLOR UR: NORMAL
GLUCOSE 24H UR-MRATE: NEGATIVE G/(24.H)
HBA1C MFR BLD HPLC: 5.8 % (ref 4–5.7)
HDLC SERPL-MCNC: 31 MG/DL (ref 35–130)
HGB UR QL STRIP: NEGATIVE
KETONES UR QL STRIP.AUTO: NEGATIVE
LDL/HDL RATIO,LDHD: 4 RATIO
LDLC SERPL CALC-MCNC: 111 MG/DL (ref 0–130)
LEUKOCYTE ESTERASE: NEGATIVE
MICROALBUMIN, URINE: 20 MG/L (ref 0–20)
NITRITE UR QL STRIP.AUTO: NEGATIVE
PH UR STRIP: 6 [PH] (ref 5–7)
PROT UR STRIP-MCNC: NEGATIVE MG/DL
RBC #/AREA URNS HPF: 0 #/HPF
SP GR UR REFRACTOMETRY: 1.01 (ref 1–1.03)
TRIGL SERPL-MCNC: 234 MG/DL (ref 0–200)
UROBILINOGEN UR QL STRIP.AUTO: NEGATIVE
VLDLC SERPL CALC-MCNC: 47 MG/DL
WBC URNS QL MICRO: 0 #/HPF

## 2019-09-18 NOTE — PROGRESS NOTES
Blood sugar was down to 5.8. Would like him to stop glimepiride. Needs to follow a controlled carb diet. Would recheck fasting blood sugar and A1c in 3 months.

## 2019-09-18 NOTE — PROGRESS NOTES
This note will not be viewable in [address] Sary Carranza is a 68 y.o. male and presents with Hypertension (6 mo fu)  . Subjective:  Mr. Wanda Zimmerman returns to the office today in follow-up of multiple medical problems. Patient now has type 2 diabetes mellitus. It is complicated by nephropathy. For a long period of time he had impaired glucose tolerance however A1c jumped to 7.2 at the last office visit and he was started on glimepiride. He checks his blood sugars once daily. Blood sugars average between 110 and 123. He has had no hypoglycemia. He is up-to-date on diabetic retinal eye examination. He has had no burning paresthesias of his feet. He has chronic kidney disease stage III which is followed by Dr. Maricarmen Dash. He was seen in July. His glucose was 96 and creatinine was 2.2. Blood pressure medications have been adjusted. He denies lower extremity edema. The patient has hypertension blood pressures have been well controlled and he denies any orthostatic dizziness, cough, lower extremity edema. He has a dyslipidemia currently on fish oil and fenofibrate. He has no history of coronary artery disease and denies exertional chest pains or claudication.     Past Medical History:   Diagnosis Date    Acute idiopathic pericarditis 5/23/2018    Finding of rib structure     right lower rib cartilage weak    Hypertension     Other ill-defined conditions(799.89)     gout    Type II diabetes mellitus with nephropathy ([facility] [address] 9/18/2019    Unspecified adverse effect of anesthesia     lays on back,chokes on drainage    Unspecified sleep apnea     septum surgery to fix, still an issue     Past Surgical History:   Procedure Laterality Date    HX KNEE ARTHROSCOPY      HX OTHER SURGICAL      kidney stones     No Known Allergies  Current Outpatient Medications   Medication Sig Dispense Refill    lisinopril (PRINIVIL, ZESTRIL) 2.5 mg tablet TAKE 1 TABLET DAILY 90 Tab 3    allopurinol (ZYLOPRIM) 100 mg tablet TAKE 2 TABLETS DAILY 180 Tab 3    fenofibrate (LOFIBRA) 160 mg tablet TAKE 1 TABLET DAILY WITH LUNCH 90 Tab 3    glimepiride (AMARYL) 2 mg tablet Take 1/2 tablet qam with breakfast 30 Tab 1    aspirin (ASPIRIN) 325 mg tablet Take 1 Tab by mouth nightly.  COLCRYS 0.6 mg tablet Take 1 Tab by mouth two (2) times a day. (Patient taking differently: Take 0.6 mg by mouth two (2) times daily as needed.) 60 Tab 3    Potassium Citrate (UROCIT-K) TbER tablet Take 15 mEq by mouth daily (with lunch).  vitamin E (AQUA GEMS) 400 unit capsule Take 400 Units by mouth daily (with lunch).  omega 3-dha-epa-fish oil (FISH OIL) 100-160-1,000 mg cap Take 1,000 mg by mouth daily (with lunch).  cholecalciferol (VITAMIN D3) 1,000 unit tablet Take 500 Units by mouth two (2) times a day.  ascorbic acid, vitamin C, (VITAMIN C) 500 mg tablet Take 500 mg by mouth daily (with lunch).        Social History     Socioeconomic History    Marital status:      Spouse name: Not on file    Number of children: Not on file    Years of education: Not on file    Highest education level: Not on file   Tobacco Use    Smoking status: Never Smoker    Smokeless tobacco: Never Used   Substance and Sexual Activity    Alcohol use: No    Drug use: No     Family History   Problem Relation Age of Onset    Diabetes Mother     Hypertension Father        Health Maintenance   Topic Date Due    FOOT EXAM Q1  01/08/1960    EYE EXAM RETINAL OR DILATED  01/08/1958    FOBT Q 1 YEAR AGE 50-75  01/08/1998    HEMOGLOBIN A1C Q6M  09/05/2019    Shingrix Vaccine Age 50> (1 of 2) 12/24/2019 (Originally 1/8/1998)    DTaP/Tdap/Td series (1 - Tdap) 03/05/2020 (Originally 1/8/1969)    Influenza Age 5 to Adult  08/01/2020 (Originally 8/1/2019)    Pneumococcal 65+ years (1 of 2 - PCV13) 09/18/2020 (Originally 1/8/2013)    MEDICARE YEARLY EXAM  03/05/2020    MICROALBUMIN Q1  03/05/2020    LIPID PANEL Q1 03/05/2020    GLAUCOMA SCREENING Q2Y  04/15/2021    Hepatitis C Screening  Completed        Review of Systems  Constitutional: negative for fevers, chills, anorexia and weight loss  Eyes:   negative for visual disturbance and irritation  ENT:   negative for tinnitus,sore throat,nasal congestion,ear pain,hoarseness  Respiratory:  negative for cough, hemoptysis, dyspnea,wheezing  CV:   negative for chest pain, palpitations, lower extremity edema  GI:   negative for nausea, vomiting, diarrhea, abdominal pain,melena  Endo:               negative for polyuria,polydipsia,polyphagia,heat intolerance  Genitourinary: negative for frequency, dysuria and hematuria  Integumentary: negative for rash and pruritus  Hematologic:  negative for easy bruising and gum/nose bleeding  Musculoskel: negative for myalgias, arthralgias, back pain, muscle weakness, joint pain  Neurological:  negative for headaches, dizziness, vertigo, memory problems and gait   Behavl/Psych: negative for feelings of anxiety, depression, mood changes  ROS otherwise negative      Objective:  Visit Vitals  /70 (BP 1 Location: Right arm, BP Patient Position: Sitting)   Pulse 70   Temp 97.6 °F (36.4 °C) (Oral)   Resp 18   Ht 5' 6\" (1.676 m)   Wt 184 lb 9.6 oz (83.7 kg)   SpO2 98%   BMI 29.80 kg/m²     Body mass index is 29.8 kg/m². Physical Exam:   General appearance - alert, well appearing, and in no distress  Mental status - alert, oriented to person, place, and time  EYE-YOLIS, EOMI,conjunctiva normal bilaterally, lids normal  ENT-ENT exam normal, no neck nodes or sinus tenderness  Nose - normal and patent, no erythema,  Or discharge   Mouth - mucous membranes moist, pharynx normal without lesions  Neck - supple, no significant adenopathy or bruit  Chest - clear to auscultation, no wheezes, rales or rhonchi.   Heart - normal rate, regular rhythm, normal S1, S2, no murmurs, rubs, clicks or gallops   Abdomen - soft, nontender, nondistended, no masses or organomegaly  Lymph- no adenopathy palpable  Ext-peripheral pulses normal, no pedal edema, no clubbing or cyanosis  Skin-Warm and dry. no hyperpigmentation, vitiligo, or suspicious lesions  Neuro -alert, oriented, normal speech, no focal findings or movement disorder noted  Diabetic foot exam:     Left Foot:   Visual Exam: normal    Pulse DP: 2+ (normal)   Filament test: normal sensation    Vibratory sensation: normal      Right Foot:   Visual Exam: normal    Pulse DP: 2+ (normal)   Filament test: normal sensation    Vibratory sensation: normal        Assessment/Plan:  Diagnoses and all orders for this visit:    Type II diabetes mellitus with nephropathy ([facility] [address]  -     COLLECTION VENOUS BLOOD,VENIPUNCTURE  -     HEMOGLOBIN A1C W/O EAG  -     URINALYSIS W/MICROSCOPIC  -     URINE, MICROALBUMIN, SEMIQUANTITATIVE  -     HM DIABETES FOOT EXAM    Essential hypertension    Dyslipidemia  -     LIPID PANEL    CKD (chronic kidney disease) stage 3, GFR 30-59 ml/min ([facility])        Other instructions: The patient's medications were reviewed and reconciled. No change in his current medical regimen is made. Body mass index is 30 and dietary counseling along with printed patient education is given    Continue to check blood sugars once daily    Patient is up-to-date on diabetic retinal eye examination and he will have a copy of his last report forwarded to us. Patient is overdue for colonoscopy and he is reminded to have this scheduled with his GI specialist as soon as possible    Await results of multiple labs    Lab results from Dr. Culver Blind office visit in July were reviewed with the patient today    Follow-up 6 months    Follow-up and Dispositions    · Return in about 6 months (around 3/18/2020). I have reviewed with the patient details of the assessment and plan and all questions were answered. Relevent patient education was performed. The most recent lab findings were reviewed with the patient.     An After Visit Summary was printed and given to the patient.     Lina Krabbe, MD

## 2019-09-18 NOTE — PROGRESS NOTES
Micaela Bustillo is a 68 y.o. male presenting for Hypertension (6 mo fu)  . 1. Have you been to the ER, urgent care clinic since your last visit? Hospitalized since your last visit? No    2. Have you seen or consulted any other health care providers outside of the [address] [place] [address] since your last visit? Include any pap smears or colon screening. Dr Chaya Campos- kidney specialist.    Fall Risk Assessment, last 12 mths 3/5/2019   Able to walk? Yes   Fall in past 12 months? No         Abuse Screening Questionnaire 3/5/2019   Do you ever feel afraid of your partner? N   Are you in a relationship with someone who physically or mentally threatens you? N   Is it safe for you to go home? Y       3 most recent PHQ Screens 3/5/2019   Little interest or pleasure in doing things Not at all   Feeling down, depressed, irritable, or hopeless Not at all   Total Score PHQ 2 0       There are no discontinued medications.

## 2019-09-18 NOTE — PATIENT INSTRUCTIONS

## 2019-11-21 ENCOUNTER — OFFICE VISIT (OUTPATIENT)
Dept: CARDIOLOGY CLINIC | Age: 69
End: 2019-11-21

## 2019-11-21 VITALS
WEIGHT: 183.2 LBS | BODY MASS INDEX: 29.44 KG/M2 | HEART RATE: 86 BPM | OXYGEN SATURATION: 97 % | SYSTOLIC BLOOD PRESSURE: 178 MMHG | HEIGHT: 66 IN | RESPIRATION RATE: 18 BRPM | DIASTOLIC BLOOD PRESSURE: 90 MMHG

## 2019-11-21 DIAGNOSIS — I10 ESSENTIAL HYPERTENSION: Primary | Chronic | ICD-10-CM

## 2019-11-21 DIAGNOSIS — I30.0 ACUTE IDIOPATHIC PERICARDITIS: ICD-10-CM

## 2019-11-21 DIAGNOSIS — N18.30 CKD (CHRONIC KIDNEY DISEASE) STAGE 3, GFR 30-59 ML/MIN (HCC): Chronic | ICD-10-CM

## 2019-11-21 DIAGNOSIS — E78.5 DYSLIPIDEMIA: Chronic | ICD-10-CM

## 2019-11-21 NOTE — PROGRESS NOTES
11/21/2019 1:22 PM      Subjective:     Jamila Brwon is here for f/u visit. He denies chest pain, chest pressure/discomfort, dyspnea, palpitations, irregular heart beats, near-syncope, syncope, fatigue, orthopnea, paroxysmal nocturnal dyspnea, exertional chest pressure/discomfort, claudication, lower extremity edema, tachypnea. Visit Vitals  /90 (BP 1 Location: Left arm, BP Patient Position: Sitting)   Pulse 86   Resp 18   Ht 5' 6\" (1.676 m)   Wt 183 lb 3.2 oz (83.1 kg)   SpO2 97%   BMI 29.57 kg/m²     Current Outpatient Medications   Medication Sig    lisinopril (PRINIVIL, ZESTRIL) 2.5 mg tablet TAKE 1 TABLET DAILY    allopurinol (ZYLOPRIM) 100 mg tablet TAKE 2 TABLETS DAILY    fenofibrate (LOFIBRA) 160 mg tablet TAKE 1 TABLET DAILY WITH LUNCH    aspirin (ASPIRIN) 325 mg tablet Take 1 Tab by mouth nightly.  COLCRYS 0.6 mg tablet Take 1 Tab by mouth two (2) times a day. (Patient taking differently: Take 0.6 mg by mouth two (2) times daily as needed.)    Potassium Citrate (UROCIT-K) TbER tablet Take 15 mEq by mouth daily (with lunch).  vitamin E (AQUA GEMS) 400 unit capsule Take 400 Units by mouth daily (with lunch).  omega 3-dha-epa-fish oil (FISH OIL) 100-160-1,000 mg cap Take 1,000 mg by mouth daily (with lunch).  cholecalciferol (VITAMIN D3) 1,000 unit tablet Take 500 Units by mouth two (2) times a day.  ascorbic acid, vitamin C, (VITAMIN C) 500 mg tablet Take 500 mg by mouth daily (with lunch).  glimepiride (AMARYL) 2 mg tablet Take 1/2 tablet qam with breakfast     No current facility-administered medications for this visit.           Objective:      Visit Vitals  /90 (BP 1 Location: Left arm, BP Patient Position: Sitting)   Pulse 86   Resp 18   Ht 5' 6\" (1.676 m)   Wt 183 lb 3.2 oz (83.1 kg)   SpO2 97%   BMI 29.57 kg/m²       Data Review:     EKG: Normal sinus rhythm, no acute st/t changes    Reviewed and/or ordered active problem list tests    Past Medical History:   Diagnosis Date    Acute idiopathic pericarditis 5/23/2018    Finding of rib structure     right lower rib cartilage weak    Hypertension     Other ill-defined conditions(799.89)     gout    [facility]    Unspecified adverse effect of anesthesia     lays on back,chokes on drainage    Unspecified sleep apnea     septum surgery to fix, still an issue      Past Surgical History:   Procedure Laterality Date    HX KNEE ARTHROSCOPY      HX OTHER SURGICAL      kidney stones     No Known Allergies   Family History   Problem Relation Age of Onset    Diabetes Mother     Hypertension Father       Social History     Socioeconomic History    Marital status:      Spouse name: Not on file    Number of children: Not on file    Years of education: Not on file    Highest education level: Not on file   Occupational History    Not on file   Social Needs    Financial resource strain: Not on file    Food insecurity:     Worry: Not on file     Inability: Not on file    Transportation needs:     Medical: Not on file     Non-medical: Not on file   Tobacco Use    Smoking status: Never Smoker    Smokeless tobacco: Never Used   Substance and Sexual Activity    Alcohol use: No    Drug use: No    Sexual activity: Not on file   Lifestyle    Physical activity:     Days per week: Not on file     Minutes per session: Not on file    Stress: Not on file   Relationships    Social connections:     Talks on phone: Not on file     Gets together: Not on file     Attends Yarsanism service: Not on file     Active member of club or organization: Not on file     Attends meetings of clubs or organizations: Not on file     Relationship status: Not on file    Intimate partner violence:     Fear of current or ex partner: Not on file     Emotionally abused: Not on file     Physically abused: Not on file     Forced sexual activity: Not on file   Other Topics Concern    Not on file   Social History Narrative    Not on file         Review of Systems     General: Not Present- Anorexia, Chills, Dietary Changes, Fatigue, Fever, Medication Changes, Night Sweats, Weight Gain > 10lbs. and Weight Loss > 10lbs. .  Skin: Not Present- Bruising and Excessive Sweating. HEENT: Not Present- Headache, Visual Loss and Vertigo. Respiratory: Not Present- Cough, Decreased Exercise Tolerance, Difficulty Breathing, Snoring and Wheezing. Cardiovascular: Not Present- Chest Pain, Claudications, Difficulty Breathing On Exertion, Edema, Fainting / Blacking Out, Irregular Heart Beat, Night Cramps, Orthopnea, Palpitations, Paroxysmal Nocturnal Dyspnea, Rapid Heart Rate, Shortness of Breath and Swelling of Extremities. Gastrointestinal: Not Present- Black, Tarry Stool, Bloody Stool, Diarrhea, Hematemesis, Rectal Bleeding and Vomiting. Musculoskeletal: Not Present- Muscle Pain and Muscle Weakness. Neurological: Not Present- Dizziness. Psychiatric: Not Present- Depression. Endocrine: Not Present- Cold Intolerance, Heat Intolerance and Thyroid Problems. Hematology: Not Present- Abnormal Bleeding, Anemia, Blood Clots and Easy Bruising.       Physical Exam   The physical exam findings are as follows:       General   Mental Status - Alert. General Appearance - Not in acute distress. Chest and Lung Exam   Inspection: Accessory muscles - No use of accessory muscles in breathing. Auscultation:   Breath sounds: - Normal.      Cardiovascular   Inspection: Jugular vein - Bilateral - Inspection Normal.  Palpation/Percussion:   Apical Impulse: - Normal.  Auscultation: Rhythm - Regular. Heart Sounds - S1 WNL and S2 WNL. No S3 or S4. Murmurs & Other Heart Sounds: Auscultation of the heart reveals - No Murmurs. Carotid arteries - No Carotid bruit. Peripheral Vascular   Upper Extremity: Inspection - Bilateral - No Cyanotic nailbeds or Digital clubbing.   Lower Extremity:   Palpation: Edema - Bilateral - No edema. Assessment:       ICD-10-CM ICD-9-CM    1. Essential hypertension I10 401.9 AMB POC EKG ROUTINE W/ 12 LEADS, INTER & REP   2. Dyslipidemia E78.5 272.4 AMB POC EKG ROUTINE W/ 12 LEADS, INTER & REP   3. CKD (chronic kidney disease) stage 3, GFR 30-59 ml/min ([facility]) N18.3 585.3    4. Acute idiopathic pericarditis I30.0 420.91        Plan:     1. H/o idiopathic pericarditis: resolved. 2. HTN: per pt BP controlled at home. He wants to continue to monitor BP for now. Lisinopril dose being managed by nephrology. 3. Last lipid panel reviewed. Diet and exercise. on fibrate. Being managed by PCP. 4. CKD: f/u with Dr. Lupis Zhu. Will follow as needed.

## 2019-11-21 NOTE — PROGRESS NOTES
1. Have you been to the ER, urgent care clinic since your last visit? Hospitalized since your last visit? No.    2. Have you seen or consulted any other health care providers outside of the [address] [place] [address] since your last visit? Include any pap smears or colon screening.    No.        Chief Complaint   Patient presents with    Annual Exam     pt denies any cardiac symptoms

## 2019-12-18 ENCOUNTER — LAB ONLY (OUTPATIENT)
Dept: INTERNAL MEDICINE CLINIC | Age: 69
End: 2019-12-18

## 2019-12-18 DIAGNOSIS — E11.21 TYPE II DIABETES MELLITUS WITH NEPHROPATHY (HCC): Primary | Chronic | ICD-10-CM

## 2019-12-18 LAB
GLUCOSE SERPL-MCNC: 116 MG/DL (ref 75–110)
HBA1C MFR BLD HPLC: 5.7 % (ref 4–5.7)

## 2020-07-21 ENCOUNTER — OFFICE VISIT (OUTPATIENT)
Dept: INTERNAL MEDICINE CLINIC | Age: 70
End: 2020-07-21

## 2020-07-21 VITALS
BODY MASS INDEX: 29.96 KG/M2 | SYSTOLIC BLOOD PRESSURE: 128 MMHG | DIASTOLIC BLOOD PRESSURE: 82 MMHG | HEIGHT: 66 IN | RESPIRATION RATE: 18 BRPM | TEMPERATURE: 98.1 F | OXYGEN SATURATION: 97 % | WEIGHT: 186.4 LBS | HEART RATE: 75 BPM

## 2020-07-21 DIAGNOSIS — I10 ESSENTIAL HYPERTENSION: Chronic | ICD-10-CM

## 2020-07-21 DIAGNOSIS — N18.30 CKD (CHRONIC KIDNEY DISEASE) STAGE 3, GFR 30-59 ML/MIN (HCC): Chronic | ICD-10-CM

## 2020-07-21 DIAGNOSIS — Z00.00 MEDICARE ANNUAL WELLNESS VISIT, SUBSEQUENT: Primary | ICD-10-CM

## 2020-07-21 DIAGNOSIS — Z12.11 COLON CANCER SCREENING: ICD-10-CM

## 2020-07-21 DIAGNOSIS — E11.21 TYPE II DIABETES MELLITUS WITH NEPHROPATHY (HCC): Chronic | ICD-10-CM

## 2020-07-21 DIAGNOSIS — E78.5 DYSLIPIDEMIA: Chronic | ICD-10-CM

## 2020-07-21 LAB
A-G RATIO,AGRAT: 1.6 RATIO
ALBUMIN SERPL-MCNC: 4.7 G/DL (ref 3.9–5.4)
ALP SERPL-CCNC: 54 U/L (ref 38–126)
ALT SERPL-CCNC: 24 U/L (ref 0–50)
ANION GAP SERPL CALC-SCNC: 14 MMOL/L
AST SERPL W P-5'-P-CCNC: 28 U/L (ref 14–36)
BILIRUB SERPL-MCNC: 0.9 MG/DL (ref 0.2–1.3)
BILIRUB UR QL: NEGATIVE
BUN SERPL-MCNC: 43 MG/DL (ref 9–20)
BUN/CREATININE RATIO,BUCR: 22 RATIO
CALCIUM SERPL-MCNC: 10.3 MG/DL (ref 8.4–10.2)
CHLORIDE SERPL-SCNC: 106 MMOL/L (ref 98–107)
CLARITY: CLEAR
CO2 SERPL-SCNC: 22 MMOL/L (ref 22–32)
COLOR UR: NORMAL
CREAT SERPL-MCNC: 2 MG/DL (ref 0.8–1.5)
ERYTHROCYTE [DISTWIDTH] IN BLOOD BY AUTOMATED COUNT: 13 %
GLOBULIN,GLOB: 3
GLUCOSE 24H UR-MRATE: NEGATIVE G/(24.H)
GLUCOSE SERPL-MCNC: 83 MG/DL (ref 75–110)
HBA1C MFR BLD HPLC: 6.4 % (ref 4–5.7)
HCT VFR BLD AUTO: 46.2 % (ref 37–51)
HGB BLD-MCNC: 14.8 G/DL (ref 12–18)
HGB UR QL STRIP: NEGATIVE
KETONES UR QL STRIP.AUTO: NEGATIVE
LEUKOCYTE ESTERASE: NEGATIVE
LYMPHOCYTES ABSOLUTE: 2.5 K/UL (ref 0.6–4.1)
LYMPHOCYTES NFR BLD: 42.5 % (ref 10–58.5)
MCH RBC QN AUTO: 33.1 PG (ref 26–32)
MCHC RBC AUTO-ENTMCNC: 32 G/DL (ref 30–36)
MCV RBC AUTO: 103.3 FL (ref 80–97)
MICROALBUMIN, URINE: NEGATIVE MG/L (ref 0–20)
MONOCYTES ABS-DIF,2141: 0.6 K/UL (ref 0–1.8)
MONOCYTES NFR BLD: 10.3 % (ref 0.1–24)
NEUTROPHILS # BLD: 47.2 % (ref 37–92)
NEUTROPHILS ABS,2156: 2.7 K/UL (ref 2–7.8)
NITRITE UR QL STRIP.AUTO: NEGATIVE
PH UR STRIP: 5 [PH] (ref 5–7)
PLATELET # BLD AUTO: 174 K/UL (ref 140–440)
POTASSIUM SERPL-SCNC: 4.9 MMOL/L (ref 3.6–5)
PROT SERPL-MCNC: 7.7 G/DL (ref 6.3–8.2)
PROT UR STRIP-MCNC: NEGATIVE MG/DL
RBC # BLD AUTO: 4.47 M/UL (ref 4.2–6.3)
RBC #/AREA URNS HPF: 0 #/HPF
SODIUM SERPL-SCNC: 142 MMOL/L (ref 137–145)
SP GR UR REFRACTOMETRY: 1.01 (ref 1–1.03)
URATE SERPL-MCNC: 5 MG/DL (ref 3.5–8.5)
UROBILINOGEN UR QL STRIP.AUTO: NEGATIVE
WBC # BLD AUTO: 5.8 K/UL (ref 4.1–10.9)
WBC URNS QL MICRO: 0 #/HPF

## 2020-07-21 NOTE — PROGRESS NOTES
Chief Complaint   Patient presents with    Follow Up Chronic Condition     6 mo fu    Annual Wellness Visit       Depression Risk Factor Screening:     3 most recent PHQ Screens 7/21/2020   Little interest or pleasure in doing things Not at all   Feeling down, depressed, irritable, or hopeless Not at all   Total Score PHQ 2 0       Functional Ability and Level of Safety:     Activities of Daily Living  ADL Assessment 7/21/2020   Feeding yourself No Help Needed   Getting from bed to chair No Help Needed   Getting dressed No Help Needed   Bathing or showering No Help Needed   Walk across the room (includes cane/walker) No Help Needed   Using the telphone No Help Needed   Taking your medications No Help Needed   Preparing meals No Help Needed   Managing money (expenses/bills) No Help Needed   Moderately strenuous housework (laundry) No Help Needed   Shopping for personal items (toiletries/medicines) No Help Needed   Shopping for groceries No Help Needed   Driving No Help Needed   Climbing a flight of stairs No Help Needed   Getting to places beyond walking distances No Help Needed       Fall Risk  Fall Risk Assessment, last 12 mths 7/21/2020   Able to walk? Yes   Fall in past 12 months? No       Abuse Screen  Abuse Screening Questionnaire 7/21/2020   Do you ever feel afraid of your partner? N   Are you in a relationship with someone who physically or mentally threatens you? N   Is it safe for you to go home?  Y         Patient Care Team   Patient Care Team:  Khurram Garsia MD as PCP - General (Internal Medicine)  Khurram Garsia MD as PCP - [facility]  Teresa Rodríguez MD as Physician (Cardiology)  Lucie Knott MD (Nephrology)  Earnestine Richard MD (Urology)

## 2020-07-21 NOTE — PATIENT INSTRUCTIONS
The best way to stay healthy is to live a healthy lifestyle. A healthy lifestyle includes regular exercise, eating a well-balanced diet, keeping a healthy weight and not smoking. Regular physical exams and screening tests are another important way to take care of yourself. Preventive exams provided by health care providers can find health problems early when treatment works best and can keep you from getting certain diseases or illnesses. Preventive services include exams, lab tests, screenings, shots, monitoring and information to help you take care of your own health. All people over 65 should have a pneumonia shot. Pneumonia shots are usually only needed once in a lifetime unless your doctor decides differently. In addition to your physical exam, some screening tests are recommended:    All people over 65 should have a yearly flu shot. People over 65 are at medium to high risk for Hepatitis B. Three shots are needed for complete protection. Bone mass measurement (dexa scan) is recommended every two years. Diabetes Mellitus screening is recommended every year. Glaucoma is an eye disease caused by high pressure in the eye. An eye exam is recommended every year. Cardiovascular screening tests that check your cholesterol and other blood fat (lipid) levels are recommended every five years. Colorectal Cancer screening tests help to find pre-cancerous polyps (growths in the colon) so they can be removed before they turn into cancer. Tests ordered for screening depend on your personal and family history risk factors. Prostate Cancer Screening (annually up to age 76)    Screening for breast cancer is recommended yearly with a Mammogram.    Screening for cervical and vaginal cancer is recommended with a pelvic and Pap test every two years.  However if you have had an abnormal pap in the past  three years or at high risk for cervical or vaginal cancer Medicare will cover a pap test and a pelvic exam every year. Here is a list of your current Health Maintenance items with a due date:  Health Maintenance Due   Topic Date Due    Eye Exam  01[birth year]08/1960    DTaP/Tdap/Td  (1 - Tdap) 01/08/1969    Shingles Vaccine (1 of 2) 01/08/1998    Colon Cancer Stool Test  01/08/1998    Annual Well Visit  03/05/2020          Colon Cancer Screening: Care Instructions  Your Care Instructions     Colorectal cancer occurs in the colon or rectum. That's the lower part of your digestive system. It is the second-leading cause of cancer deaths in the [place]. It often starts with small growths called polyps in the colon or rectum. Polyps are usually found with screening tests. Depending on the type of test, any polyps found may be removed during the tests. Colorectal cancer usually does not cause symptoms at first. But regular tests can help find it early, before it spreads and becomes harder to treat. Your risk for colorectal cancer gets higher as you get older. Some experts say that adults should start regular screening at age 48 and stop at age 76. Others say to start before age 48 or continue after age 76. Talk with your doctor about your risk and when to start and stop screening. You may have one of several tests. Follow-up care is a key part of your treatment and safety. Be sure to make and go to all appointments, and call your doctor if you are having problems. It's also a good idea to know your test results and keep a list of the medicines you take. What are the main screening tests for colon cancer? The screening tests are:  Stool tests. These include the guaiac fecal occult blood test (gFOBT), the fecal immunochemical test (FIT), and the combined fecal immunochemical test and stool DNA test (FIT-DNA). These tests check stool samples for signs of cancer. If your test is positive, you will need to have a colonoscopy. Sigmoidoscopy.    This test lets your doctor look at the lining of your rectum and the lowest part of your colon. Your doctor uses a lighted tube called a sigmoidoscope. This test can't find cancers or polyps in the upper part of your colon. In some cases, polyps that are found can be removed. But if your doctor finds polyps, you will need to have a colonoscopy to check the upper part of your colon. Colonoscopy. This test lets your doctor look at the lining of your rectum and your entire colon. The doctor uses a thin, flexible tool called a colonoscope. It can also be used to remove polyps or get a tissue sample (biopsy). A less common test is CT colonography (CTC). It's also called virtual colonoscopy. Who should be screened for colorectal cancer? Your risk for colorectal cancer gets higher as you get older. Some experts say that adults should start regular screening at age 48 and stop at age 76. Others say to start before age 48 or continue after age 76. Talk with your doctor about your risk and when to start and stop screening. How often you need screening depends on the type of test you get:  Stool tests. Every 1 or 2 years for FIT or gFOBT. Every 3 years for sDNA, also called FIT-DNA. Tests that look inside the colon. Every 5 or 10 years for sigmoidoscopy. Every 5 years for CT colonography (virtual colonoscopy). Every 10 years for colonoscopy. Experts agree that people at higher risk may need to be tested sooner. This includes people who have a strong family history of colon cancer. Talk to your doctor about which test is best for you and when to be tested. When should you call for help? Watch closely for changes in your health, and be sure to contact your doctor if:  · You have any changes in your bowel habits. · You have any problems. Where can you learn more? Go to http://sharon-lloyd.info/  Enter M541 in the search box to learn more about \"Colon Cancer Screening: Care Instructions. \"  Current as of: August 22, 2019               Content Version: 12.5  © 9436-6685 [employer]. Care instructions adapted under license by [employer] (which disclaims liability or warranty for this information). If you have questions about a medical condition or this instruction, always ask your healthcare professional. [address] any warranty or liability for your use of this information.

## 2020-07-21 NOTE — PROGRESS NOTES
This note will not be viewable in [address] Genesis Raymond is a 65 y.o. male and presents with Follow Up Chronic Condition (6 mo fu) and Annual Wellness Visit  . Subjective:  Mr. Patrick Powell presents to the office today for a Medicare wellness exam and follow-up of multiple medical problems. The patient has type 2 diabetes mellitus for which he had been on oral medication and was taken off of it as his A1c had dropped down into the normal range. He has not been checking his blood sugars. He denies polyuria polydipsia or blurred vision. There is been no unexplained weight loss. His diabetes has been complicated by nephropathy with chronic kidney disease and being followed by nephrology. Patient has a history of gout and is currently on allopurinol and as needed Colcrys. In addition he takes potassium citrate for kidney stones and has been uric acid stone free for some time. He has had no joint pain. There is been no long-term side effects related to his treatment. Dyslipidemia is currently managed on fenofibrate. He denies GI upset. He has no history of coronary artery disease and denies exertional chest pain or claudication. He also takes a fish oil supplement for this as well.   Past Medical History:   Diagnosis Date    Acute idiopathic pericarditis 5/23/2018    Finding of rib structure     right lower rib cartilage weak    Hypertension     Other ill-defined conditions(799.89)     gout    Type II diabetes mellitus with nephropathy ([facility] [address] 9/18/2019    Unspecified adverse effect of anesthesia     lays on back,chokes on drainage    Unspecified sleep apnea     septum surgery to fix, still an issue     Past Surgical History:   Procedure Laterality Date    HX KNEE ARTHROSCOPY      HX OTHER SURGICAL      kidney stones     No Known Allergies  Current Outpatient Medications   Medication Sig Dispense Refill    lisinopril (PRINIVIL, ZESTRIL) 2.5 mg tablet TAKE 1 TABLET DAILY 90 Tab 3    allopurinol (ZYLOPRIM) 100 mg tablet TAKE 2 TABLETS DAILY 180 Tab 3    fenofibrate (LOFIBRA) 160 mg tablet TAKE 1 TABLET DAILY WITH LUNCH 90 Tab 3    aspirin (ASPIRIN) 325 mg tablet Take 1 Tab by mouth nightly.  COLCRYS 0.6 mg tablet Take 1 Tab by mouth two (2) times a day. (Patient taking differently: Take 0.6 mg by mouth two (2) times daily as needed.) 60 Tab 3    Potassium Citrate (UROCIT-K) TbER tablet Take 15 mEq by mouth daily (with lunch).  vitamin E (AQUA GEMS) 400 unit capsule Take 400 Units by mouth daily (with lunch).  omega 3-dha-epa-fish oil (FISH OIL) 100-160-1,000 mg cap Take 1,000 mg by mouth daily (with lunch).  cholecalciferol (VITAMIN D3) 1,000 unit tablet Take 500 Units by mouth two (2) times a day.  ascorbic acid, vitamin C, (VITAMIN C) 500 mg tablet Take 500 mg by mouth daily (with lunch).        Social History     Socioeconomic History    Marital status:      Spouse name: Not on file    Number of children: Not on file    Years of education: Not on file    Highest education level: Not on file   Tobacco Use    Smoking status: Never Smoker    Smokeless tobacco: Never Used   Substance and Sexual Activity    Alcohol use: No    Drug use: No     Family History   Problem Relation Age of Onset    Diabetes Mother     Hypertension Father        Health Maintenance   Topic Date Due    Eye Exam Retinal or Dilated  01/08/1958    FOBT Q1Y Age 54-65  01/08/1998    Medicare Yearly Exam  03/05/2020    Pneumococcal 65+ years (1 of 1 - PPSV23) 09/18/2020 (Originally 1/8/2013)    Shingrix Vaccine Age 50> (1 of 2) 10/26/2020 (Originally 1/8/1998)    DTaP/Tdap/Td series (1 - Tdap) 07/21/2021 (Originally 1/8/1969)    Influenza Age 5 to Adult  08/01/2020    Foot Exam Q1  09/18/2020    MICROALBUMIN Q1  09/18/2020    Lipid Screen  09/18/2020    A1C test (Diabetic or Prediabetic)  12/18/2020    GLAUCOMA SCREENING Q2Y  04/15/2021    Hepatitis C Screening  Completed Review of Systems  Constitutional: negative for fevers, chills, anorexia and weight loss  Eyes:   negative for visual disturbance and irritation  ENT:   negative for tinnitus,sore throat,nasal congestion,ear pain,hoarseness  Respiratory:  negative for cough, hemoptysis, dyspnea,wheezing  CV:   negative for chest pain, palpitations, lower extremity edema  GI:   negative for nausea, vomiting, diarrhea, abdominal pain,melena  Endo:               negative for polyuria,polydipsia,polyphagia,heat intolerance  Genitourinary: negative for frequency, dysuria and hematuria  Integumentary: negative for rash and pruritus  Hematologic:  negative for easy bruising and gum/nose bleeding  Musculoskel: negative for myalgias, arthralgias, back pain, muscle weakness, joint pain  Neurological:  negative for headaches, dizziness, vertigo, memory problems and gait   Behavl/Psych: negative for feelings of anxiety, depression, mood changes  ROS otherwise negative      Objective:  Visit Vitals  /82 (BP 1 Location: Right arm, BP Patient Position: Sitting)   Pulse 75   Temp 98.1 °F (36.7 °C) (Oral)   Resp 18   Ht 5' 6\" (1.676 m)   Wt 186 lb 6.4 oz (84.6 kg)   SpO2 97%   BMI 30.09 kg/m²     Body mass index is 30.09 kg/m². Physical Exam:   General appearance - alert, well appearing, and in no distress  Mental status - alert, oriented to person, place, and time  EYE-YOLIS, EOMI,conjunctiva normal bilaterally, lids normal  ENT-ENT exam normal, no neck nodes or sinus tenderness  Nose - normal and patent, no erythema,  Or discharge   Mouth - mucous membranes moist, pharynx normal without lesions  Neck - supple, no significant adenopathy or bruit  Chest - clear to auscultation, no wheezes, rales or rhonchi.   Heart - normal rate, regular rhythm, normal S1, S2, no murmurs, rubs, clicks or gallops   Abdomen - soft, nontender, nondistended, no masses or organomegaly  Lymph- no adenopathy palpable  Ext-peripheral pulses normal, no pedal edema, no clubbing or cyanosis  Skin-Warm and dry. no hyperpigmentation, vitiligo, or suspicious lesions  Neuro -alert, oriented, normal speech, no focal findings or movement disorder noted    In addition this patient is seen for AWV  as detailed below: This is a Subsequent Medicare Annual Wellness Exam (AWV) (Performed 12 months after IPPE or effective date of Medicare Part B enrollment)    I have reviewed the patient's medical history in detail and updated the computerized patient record. Problem list reviewed with patient and risk factors discussed. PSH, SH, FH, Medications and HM issues also reviewed and discussed. Depression screen, fall risk assessment, functional abilities and ACP also reviewed and discussed as above and below. Depression Risk Factor Screening:     3 most recent PHQ Screens 7/21/2020   Little interest or pleasure in doing things Not at all   Feeling down, depressed, irritable, or hopeless Not at all   Total Score PHQ 2 0     Alcohol Risk Factor Screening: You do not drink alcohol or very rarely. Functional Ability and Level of Safety:   Hearing Loss  Hearing is good. Activities of Daily Living  The home contains: no safety equipment. Patient does total self care    Fall Risk  Fall Risk Assessment, last 12 mths 7/21/2020   Able to walk? Yes   Fall in past 12 months?  No       Abuse Screen  Patient is not abused    Cognitive Screening   Evaluation of Cognitive Function:  Has your family/caregiver stated any concerns about your memory: no  Normal    Patient Care Team   Patient Care Team:  Dileep Wing MD as PCP - General (Internal Medicine)  Dileep Wing MD as PCP - [facility]  Trevor Dumont MD as Physician (Cardiology)  Fartun Elizalde MD (Nephrology)  Ainsley Dukes MD (Urology)    Assessment/Plan   Education and counseling provided:  Are appropriate based on today's review and evaluation    Assessment/Plan:   Impressions: ICD-10-CM ICD-9-CM    1. Medicare annual wellness visit, subsequent  Z00.00 V70.0    2. Type II diabetes mellitus with nephropathy (HCC)  E11.21 250.40 HEMOGLOBIN A1C W/O EAG     583.81 URINE, MICROALBUMIN, SEMIQUANTITATIVE   3. Essential hypertension  I10 401.9 COLLECTION VENOUS BLOOD,VENIPUNCTURE      CBC WITH AUTOMATED DIFF      METABOLIC PANEL, COMPREHENSIVE      URINALYSIS W/MICROSCOPIC   4. Dyslipidemia  E78.5 272.4 LIPID PANEL      TSH 3RD GENERATION   5. CKD (chronic kidney disease) stage 3, GFR 30-59 ml/min (HCC)  N18.3 585. 3 URIC ACID   6. Colon cancer screening  Z12.11 V76.51 COLOGUARD TEST (FECAL DNA COLORECTAL CANCER SCREENING)        Plan:  1. Continue present meds  2. Lifestyle modifications including Na restriction, low carb/fat diet, weight reduction and exercise (at least a walking program). Follow-up and Dispositions    · Return in about 6 months (around 1/21/2021).            Orders Placed This Encounter    COLOGUARD TEST (FECAL DNA COLORECTAL CANCER SCREENING)    CBC WITH AUTOMATED DIFF ([facility])    HEMOGLOBIN A1C W/O EAG ([facility])    LIPID PANEL ([facility])    METABOLIC PANEL, COMPREHENSIVE ([facility])    TSH 3RD GENERATION ([facility])    URINALYSIS W/MICROSCOPIC ([facility])    URINE, MICROALBUMIN, SEMIQUANTITATIVE ([facility])    URIC ACID ([facility])    COLLECTION VENOUS BLOOD,VENIPUNCTURE       Randell Morales MD   Assessment/Plan:  Diagnoses and all orders for this visit:    Medicare annual wellness visit, subsequent    Type II diabetes mellitus with nephropathy (HCC)  -     HEMOGLOBIN A1C W/O EAG  -     URINE, MICROALBUMIN, SEMIQUANTITATIVE    Essential hypertension  -     COLLECTION VENOUS BLOOD,VENIPUNCTURE  -     CBC WITH AUTOMATED DIFF  -     METABOLIC PANEL, COMPREHENSIVE  -     URINALYSIS W/MICROSCOPIC    Dyslipidemia  -     LIPID PANEL  -     TSH 3RD GENERATION    CKD (chronic kidney disease) stage 3, GFR 30-59 ml/min ([facility])  -     URIC ACID    Colon cancer screening  -     COLOGUARD TEST (FECAL DNA COLORECTAL CANCER SCREENING)        Health Maintenance Due   Topic Date Due    Eye Exam Retinal or Dilated  01/08/1958    FOBT Q1Y Age 50-75  01/08/1998    Medicare Yearly Exam  03/05/2020       Other instructions: The patient's medications were reviewed and reconciled. No change in his current medical regimen will be made. A no added salt, prudent diet is encouraged    Consider start of once daily blood sugar checks    Health maintenance issues were reviewed and he has not had colorectal cancer screening because he is afraid of the prep needed and the effect it would have on his kidney function. Cologuard testing was recommended and he is amenable to this. The patient is up-to-date on diabetic eye examination and will have his eye doctor send a copy of his last exam to us. Await results of multiple labs    Follow-up in 6 months  Follow-up and Dispositions    · Return in about 6 months (around 1/21/2021). I have reviewed with the patient details of the assessment and plan and all questions were answered. Relevent patient education was performed. The most recent lab findings were reviewed with the patient. An After Visit Summary was printed and given to the patient. Milla Galvan MD    Please note that this dictation was completed with [employer], the computer voice recognition software. Quite often unanticipated grammatical, syntax, homophones, and other interpretive errors are inadvertently transcribed by the computer software. Please disregard these errors. Please excuse any errors that have escaped final proofreading.

## 2020-07-22 LAB
CHOLEST SERPL-MCNC: 183 MG/DL (ref 100–199)
HDLC SERPL-MCNC: 34 MG/DL
LDLC SERPL CALC-MCNC: 109 MG/DL (ref 0–99)
TRIGL SERPL-MCNC: 200 MG/DL (ref 0–149)
TSH SERPL DL<=0.05 MIU/L-ACNC: 1.22 UIU/ML (ref 0.34–5.6)
VLDLC SERPL CALC-MCNC: 40 MG/DL (ref 5–40)

## 2020-08-07 ENCOUNTER — TELEPHONE (OUTPATIENT)
Dept: INTERNAL MEDICINE CLINIC | Age: 70
End: 2020-08-07

## 2020-08-07 LAB — COLOGUARD TEST, EXTERNAL: NEGATIVE

## 2020-09-05 RX ORDER — FENOFIBRATE 160 MG/1
TABLET ORAL
Qty: 90 TAB | Refills: 3 | Status: SHIPPED | OUTPATIENT
Start: 2020-09-05 | End: 2021-11-21

## 2020-09-05 RX ORDER — ALLOPURINOL 100 MG/1
TABLET ORAL
Qty: 180 TAB | Refills: 3 | Status: SHIPPED | OUTPATIENT
Start: 2020-09-05 | End: 2021-11-21

## 2020-09-05 RX ORDER — LISINOPRIL 2.5 MG/1
TABLET ORAL
Qty: 90 TAB | Refills: 3 | Status: SHIPPED | OUTPATIENT
Start: 2020-09-05 | End: 2021-11-21

## 2021-01-21 ENCOUNTER — OFFICE VISIT (OUTPATIENT)
Dept: INTERNAL MEDICINE CLINIC | Age: 71
End: 2021-01-21
Payer: MEDICARE

## 2021-01-21 VITALS
OXYGEN SATURATION: 99 % | HEART RATE: 75 BPM | SYSTOLIC BLOOD PRESSURE: 136 MMHG | WEIGHT: 188.6 LBS | RESPIRATION RATE: 18 BRPM | DIASTOLIC BLOOD PRESSURE: 72 MMHG | HEIGHT: 66 IN | TEMPERATURE: 97.9 F | BODY MASS INDEX: 30.31 KG/M2

## 2021-01-21 DIAGNOSIS — I10 ESSENTIAL HYPERTENSION: Primary | Chronic | ICD-10-CM

## 2021-01-21 DIAGNOSIS — M10.9 GOUT, UNSPECIFIED CAUSE, UNSPECIFIED CHRONICITY, UNSPECIFIED SITE: ICD-10-CM

## 2021-01-21 DIAGNOSIS — E11.21 TYPE II DIABETES MELLITUS WITH NEPHROPATHY (HCC): Chronic | ICD-10-CM

## 2021-01-21 DIAGNOSIS — E78.5 DYSLIPIDEMIA: Chronic | ICD-10-CM

## 2021-01-21 DIAGNOSIS — N18.32 STAGE 3B CHRONIC KIDNEY DISEASE (HCC): Chronic | ICD-10-CM

## 2021-01-21 LAB
A-G RATIO,AGRAT: 1.6 RATIO
ALBUMIN SERPL-MCNC: 4.7 G/DL (ref 3.9–5.4)
ALP SERPL-CCNC: 61 U/L (ref 38–126)
ALT SERPL-CCNC: 34 U/L (ref 0–50)
ANION GAP SERPL CALC-SCNC: 16 MMOL/L
AST SERPL W P-5'-P-CCNC: 35 U/L (ref 14–36)
BILIRUB SERPL-MCNC: 1.1 MG/DL (ref 0.2–1.3)
BILIRUB UR QL: NEGATIVE
BUN SERPL-MCNC: 42 MG/DL (ref 9–20)
BUN/CREATININE RATIO,BUCR: 21 RATIO
CALCIUM SERPL-MCNC: 10 MG/DL (ref 8.4–10.2)
CHLORIDE SERPL-SCNC: 101 MMOL/L (ref 98–107)
CHOL/HDL RATIO,CHHD: 6 RATIO (ref 0–4)
CHOLEST SERPL-MCNC: 192 MG/DL (ref 0–200)
CLARITY: CLEAR
CO2 SERPL-SCNC: 25 MMOL/L (ref 22–32)
COLOR UR: NORMAL
CREAT SERPL-MCNC: 2 MG/DL (ref 0.8–1.5)
ERYTHROCYTE [DISTWIDTH] IN BLOOD BY AUTOMATED COUNT: 13.2 %
GLOBULIN,GLOB: 2.9
GLUCOSE 24H UR-MRATE: NEGATIVE G/(24.H)
GLUCOSE SERPL-MCNC: 131 MG/DL (ref 75–110)
HBA1C MFR BLD HPLC: 7.2 % (ref 4–5.7)
HCT VFR BLD AUTO: 46.1 % (ref 37–51)
HDLC SERPL-MCNC: 34 MG/DL (ref 35–130)
HGB BLD-MCNC: 14.6 G/DL (ref 12–18)
HGB UR QL STRIP: NEGATIVE
KETONES UR QL STRIP.AUTO: NEGATIVE
LDL/HDL RATIO,LDHD: 3 RATIO
LDLC SERPL CALC-MCNC: 87 MG/DL (ref 0–130)
LEUKOCYTE ESTERASE: NEGATIVE
LYMPHOCYTES ABSOLUTE: 2.1 K/UL (ref 0.6–4.1)
LYMPHOCYTES NFR BLD: 43.1 % (ref 10–58.5)
MCH RBC QN AUTO: 32.6 PG (ref 26–32)
MCHC RBC AUTO-ENTMCNC: 31.7 G/DL (ref 30–36)
MCV RBC AUTO: 103 FL (ref 80–97)
MICROALBUMIN, URINE: 20 MG/L (ref 0–20)
MONOCYTES ABS-DIF,2141: 0.5 K/UL (ref 0–1.8)
MONOCYTES NFR BLD: 10.4 % (ref 0.1–24)
NEUTROPHILS # BLD: 46.5 % (ref 37–92)
NEUTROPHILS ABS,2156: 2.3 K/UL (ref 2–7.8)
NITRITE UR QL STRIP.AUTO: NEGATIVE
PH UR STRIP: 5 [PH] (ref 5–7)
PLATELET # BLD AUTO: 166 K/UL (ref 140–440)
POTASSIUM SERPL-SCNC: 4.6 MMOL/L (ref 3.6–5)
PROT SERPL-MCNC: 7.6 G/DL (ref 6.3–8.2)
PROT UR STRIP-MCNC: NEGATIVE MG/DL
RBC # BLD AUTO: 4.48 M/UL (ref 4.2–6.3)
RBC #/AREA URNS HPF: 0 #/HPF
SODIUM SERPL-SCNC: 142 MMOL/L (ref 137–145)
SP GR UR REFRACTOMETRY: 1.01 (ref 1–1.03)
TRIGL SERPL-MCNC: 355 MG/DL (ref 0–200)
TSH SERPL DL<=0.05 MIU/L-ACNC: 1.9 UIU/ML (ref 0.34–5.6)
URATE SERPL-MCNC: 6.1 MG/DL (ref 3.5–8.5)
UROBILINOGEN UR QL STRIP.AUTO: NEGATIVE
VLDLC SERPL CALC-MCNC: 71 MG/DL
WBC # BLD AUTO: 4.9 K/UL (ref 4.1–10.9)
WBC URNS QL MICRO: 0 #/HPF

## 2021-01-21 PROCEDURE — 85025 COMPLETE CBC W/AUTO DIFF WBC: CPT | Performed by: INTERNAL MEDICINE

## 2021-01-21 PROCEDURE — 99214 OFFICE O/P EST MOD 30 MIN: CPT | Performed by: INTERNAL MEDICINE

## 2021-01-21 PROCEDURE — G8427 DOCREV CUR MEDS BY ELIG CLIN: HCPCS | Performed by: INTERNAL MEDICINE

## 2021-01-21 PROCEDURE — G8752 SYS BP LESS 140: HCPCS | Performed by: INTERNAL MEDICINE

## 2021-01-21 PROCEDURE — 84550 ASSAY OF BLOOD/URIC ACID: CPT | Performed by: INTERNAL MEDICINE

## 2021-01-21 PROCEDURE — 3046F HEMOGLOBIN A1C LEVEL >9.0%: CPT | Performed by: INTERNAL MEDICINE

## 2021-01-21 PROCEDURE — 81001 URINALYSIS AUTO W/SCOPE: CPT | Performed by: INTERNAL MEDICINE

## 2021-01-21 PROCEDURE — 3017F COLORECTAL CA SCREEN DOC REV: CPT | Performed by: INTERNAL MEDICINE

## 2021-01-21 PROCEDURE — 2022F DILAT RTA XM EVC RTNOPTHY: CPT | Performed by: INTERNAL MEDICINE

## 2021-01-21 PROCEDURE — G8510 SCR DEP NEG, NO PLAN REQD: HCPCS | Performed by: INTERNAL MEDICINE

## 2021-01-21 PROCEDURE — 84443 ASSAY THYROID STIM HORMONE: CPT | Performed by: INTERNAL MEDICINE

## 2021-01-21 PROCEDURE — G8417 CALC BMI ABV UP PARAM F/U: HCPCS | Performed by: INTERNAL MEDICINE

## 2021-01-21 PROCEDURE — 80061 LIPID PANEL: CPT | Performed by: INTERNAL MEDICINE

## 2021-01-21 PROCEDURE — 82044 UR ALBUMIN SEMIQUANTITATIVE: CPT | Performed by: INTERNAL MEDICINE

## 2021-01-21 PROCEDURE — 1101F PT FALLS ASSESS-DOCD LE1/YR: CPT | Performed by: INTERNAL MEDICINE

## 2021-01-21 PROCEDURE — G8754 DIAS BP LESS 90: HCPCS | Performed by: INTERNAL MEDICINE

## 2021-01-21 PROCEDURE — 83036 HEMOGLOBIN GLYCOSYLATED A1C: CPT | Performed by: INTERNAL MEDICINE

## 2021-01-21 PROCEDURE — 80053 COMPREHEN METABOLIC PANEL: CPT | Performed by: INTERNAL MEDICINE

## 2021-01-21 PROCEDURE — G8536 NO DOC ELDER MAL SCRN: HCPCS | Performed by: INTERNAL MEDICINE

## 2021-01-21 NOTE — PROGRESS NOTES
Colleen Beth is a 66 y.o. male and presents with Follow Up Chronic Condition (6 mo fu)  . Subjective:  Mr. Justine Nelson presents to the office today in follow-up of multiple medical problems. The patient has hypertension and remains on lisinopril. He tolerates this without cough, rash, dizziness or lower extremity edema. He denies headaches, numbness, tingling or focal neurological problems. Dyslipidemia is currently managed on fenofibrate and fish oil. He tolerates this without GI upset. He has no history of coronary artery disease and denies exertional chest pains or claudication. He has type 2 diabetes mellitus complicated by nephropathy. He has controlling this with diet and exercise. His last A1c was 6.4. Patient states that he is up-to-date on diabetic retinal eye exam.  He denies polyuria, polydipsia, blurred vision or unexplained weight loss. He has had no burning paresthesias of his feet. He has chronic kidney disease and is followed by Dr. Terra Figueroa. There is been no progression of disease over time to this point. He also has a history of hyperuricemia with gout. He remains on allopurinol and also takes Colcrys. He has had no recent flares.     Past Medical History:   Diagnosis Date    Acute idiopathic pericarditis 5/23/2018    Finding of rib structure     right lower rib cartilage weak    Hypertension     Other ill-defined conditions(799.89)     gout    Type II diabetes mellitus with nephropathy ([facility] [address] 9/18/2019    Unspecified adverse effect of anesthesia     lays on back,chokes on drainage    Unspecified sleep apnea     septum surgery to fix, still an issue     Past Surgical History:   Procedure Laterality Date    HX KNEE ARTHROSCOPY      HX OTHER SURGICAL      kidney stones     No Known Allergies  Current Outpatient Medications   Medication Sig Dispense Refill    fenofibrate (LOFIBRA) 160 mg tablet TAKE 1 TABLET DAILY WITH LUNCH 90 Tab 3    lisinopriL (PRINIVIL, ZESTRIL) 2.5 mg tablet TAKE 1 TABLET DAILY 90 Tab 3    allopurinoL (ZYLOPRIM) 100 mg tablet TAKE 2 TABLETS DAILY 180 Tab 3    aspirin (ASPIRIN) 325 mg tablet Take 1 Tab by mouth nightly.  COLCRYS 0.6 mg tablet Take 1 Tab by mouth two (2) times a day. (Patient taking differently: Take 0.6 mg by mouth two (2) times daily as needed.) 60 Tab 3    Potassium Citrate (UROCIT-K) TbER tablet Take 15 mEq by mouth daily (with lunch).  vitamin E (AQUA GEMS) 400 unit capsule Take 400 Units by mouth daily (with lunch).  omega 3-dha-epa-fish oil (FISH OIL) 100-160-1,000 mg cap Take 1,000 mg by mouth daily (with lunch).  cholecalciferol (VITAMIN D3) 1,000 unit tablet Take 500 Units by mouth two (2) times a day.  ascorbic acid, vitamin C, (VITAMIN C) 500 mg tablet Take 500 mg by mouth daily (with lunch).        Social History     Socioeconomic History    Marital status:      Spouse name: Not on file    Number of children: Not on file    Years of education: Not on file    Highest education level: Not on file   Tobacco Use    Smoking status: Never Smoker    Smokeless tobacco: Never Used   Substance and Sexual Activity    Alcohol use: No    Drug use: No     Family History   Problem Relation Age of Onset    Diabetes Mother     Hypertension Father        Health Maintenance   Topic Date Due    Eye Exam Retinal or Dilated  01/08/1958    COVID-19 Vaccine (1 of 2) 01/08/1964    Shingrix Vaccine Age 50> (1 of 2) 01/08/1998    Pneumococcal 65+ years (1 of 1 - PPSV23) 01/08/2013    Foot Exam Q1  09/18/2020    Flu Vaccine (1) 06/30/2021 (Originally 9/1/2020)    DTaP/Tdap/Td series (1 - Tdap) 07/21/2021 (Originally 1/8/1969)    GLAUCOMA SCREENING Q2Y  04/15/2021    A1C test (Diabetic or Prediabetic)  07/21/2021    MICROALBUMIN Q1  07/21/2021    Lipid Screen  07/21/2021    Medicare Yearly Exam  07/22/2021    Colorectal Cancer Screening Combo  08/06/2023    Hepatitis C Screening Completed        Review of Systems  Constitutional: negative for fevers, chills, anorexia and weight loss  Eyes:   negative for visual disturbance and irritation  ENT:   negative for tinnitus,sore throat,nasal congestion,ear pain,hoarseness  Respiratory:  negative for cough, hemoptysis, dyspnea,wheezing  CV:   negative for chest pain, palpitations, lower extremity edema  GI:   negative for nausea, vomiting, diarrhea, abdominal pain,melena  Endo:               negative for polyuria,polydipsia,polyphagia,heat intolerance  Genitourinary: negative for frequency, dysuria and hematuria  Integumentary: negative for rash and pruritus  Hematologic:  negative for easy bruising and gum/nose bleeding  Musculoskel: negative for myalgias, arthralgias, back pain, muscle weakness, joint pain  Neurological:  negative for headaches, dizziness, vertigo, memory problems and gait   Behavl/Psych: negative for feelings of anxiety, depression, mood changes  ROS otherwise negative      Objective:  Visit Vitals  /72 (BP 1 Location: Left arm, BP Patient Position: Sitting)   Pulse 75   Temp 97.9 °F (36.6 °C) (Oral)   Resp 18   Ht 5' 6\" (1.676 m)   Wt 188 lb 9.6 oz (85.5 kg)   SpO2 99%   BMI 30.44 kg/m²     Body mass index is 30.44 kg/m². Physical Exam:   General appearance - alert, well appearing, and in no distress  Mental status - alert, oriented to person, place, and time  EYE-YOLIS, EOMI,conjunctiva normal bilaterally, lids normal  ENT-ENT exam normal, no neck nodes or sinus tenderness  Nose - normal and patent, no erythema,  Or discharge   Mouth - mucous membranes moist, pharynx normal without lesions  Neck - supple, no significant adenopathy or bruit  Chest - clear to auscultation, no wheezes, rales or rhonchi.   Heart - normal rate, regular rhythm, normal S1, S2, no murmurs, rubs, clicks or gallops   Abdomen - soft, nontender, nondistended, no masses or organomegaly  Lymph- no adenopathy palpable  Ext-peripheral pulses normal, no pedal edema, no clubbing or cyanosis  Skin-Warm and dry. no hyperpigmentation, vitiligo, or suspicious lesions  Neuro -alert, oriented, normal speech, no focal findings or movement disorder noted      Assessment/Plan:  Diagnoses and all orders for this visit:    Essential hypertension  -     COLLECTION VENOUS BLOOD,VENIPUNCTURE  -     CBC WITH AUTOMATED DIFF  -     METABOLIC PANEL, COMPREHENSIVE  -     URINALYSIS W/MICROSCOPIC    Dyslipidemia  -     LIPID PANEL  -     TSH 3RD GENERATION    Stage 3b chronic kidney disease    Type II diabetes mellitus with nephropathy (HCC)  -     HEMOGLOBIN A1C W/O EAG  -     URINE, MICROALBUMIN, SEMIQUANTITATIVE    Gout, unspecified cause, unspecified chronicity, unspecified site  -     URIC ACID        Other instructions: The patient's medications were reviewed and reconciled. No change in his multidrug regimen will be made. A no added salt, prudent diet will be encouraged. Health maintenance issues were reviewed and have recommended diabetic retinal eye exam.    Age-appropriate vaccinations were reviewed and influenza vaccine was declined. He does plan on getting the Trenerys Novinger 232 vaccination when it is available. Shingles vaccine series and Pneumovax 23 has been recommended. Continue to follow-up with nephrology regarding his chronic kidney disease    Await results of multiple labs    Follow-up in 6 months    Follow-up and Dispositions    · Return in about 6 months (around 7/21/2021). I have reviewed with the patient details of the assessment and plan and all questions were answered. Relevent patient education was performed. The most recent lab findings were reviewed with the patient. An After Visit Summary was printed and given to the patient. Frances Rebolledo MD    Please note that this dictation was completed with [email or web address], the [employer] voice recognition software.   Quite often unanticipated grammatical, syntax, homophones, and other interpretive errors are inadvertently transcribed by the computer software. Please disregard these errors. Please excuse any errors that have escaped final proofreading.

## 2021-01-21 NOTE — PATIENT INSTRUCTIONS

## 2021-01-21 NOTE — PROGRESS NOTES
Ronney Goodpasture. is a 66 y.o. male presenting for Follow Up Chronic Condition (6 mo fu)  . 1. Have you been to the ER, urgent care clinic since your last visit? Hospitalized since your last visit? No    2. Have you seen or consulted any other health care providers outside of the [address] [place] [address] since your last visit? Include any pap smears or colon screening. No    Fall Risk Assessment, last 12 mths 1/21/2021   Able to walk? Yes   Fall in past 12 months? 0   Do you feel unsteady? 0   Are you worried about falling 0         Abuse Screening Questionnaire 1/21/2021   Do you ever feel afraid of your partner? N   Are you in a relationship with someone who physically or mentally threatens you? N   Is it safe for you to go home? Y       3 most recent PHQ Screens 1/21/2021   Little interest or pleasure in doing things Not at all   Feeling down, depressed, irritable, or hopeless Not at all   Total Score PHQ 2 0       There are no discontinued medications.

## 2021-01-22 NOTE — PROGRESS NOTES
with A1c now up to 7.2 - needs to do better with diet or may have to start medications to control  Recheck FBS, A1c 3 months  Fax copy of labs to nephrologist

## 2021-04-16 NOTE — TELEPHONE ENCOUNTER
Patient advised Cologuard test was Negative per Dr Mccray Lax Patient thinks left eye is infected.  Not wearing eye make up.   Neosporin on eye and now it is worse.   Eye red and irritated.   Patient using warm compress QID with no improvement.   Aware no openings here, will go to UC, no further questions.

## 2021-04-22 ENCOUNTER — LAB ONLY (OUTPATIENT)
Dept: INTERNAL MEDICINE CLINIC | Age: 71
End: 2021-04-22

## 2021-04-22 DIAGNOSIS — E11.21 TYPE II DIABETES MELLITUS WITH NEPHROPATHY (HCC): Primary | ICD-10-CM

## 2021-04-22 LAB
EST. AVERAGE GLUCOSE BLD GHB EST-MCNC: 131 MG/DL
GLUCOSE SERPL-MCNC: 101 MG/DL (ref 65–100)
HBA1C MFR BLD: 6.2 % (ref 4–5.6)

## 2021-04-22 NOTE — PROGRESS NOTES
Fasting blood sugar was 101 and A1c is down to 6.2 both of which are improved over the values that week obtained 3 months ago.   Continue current diet, exercise etc.

## 2021-07-26 ENCOUNTER — OFFICE VISIT (OUTPATIENT)
Dept: INTERNAL MEDICINE CLINIC | Age: 71
End: 2021-07-26
Payer: MEDICARE

## 2021-07-26 VITALS
HEART RATE: 75 BPM | RESPIRATION RATE: 16 BRPM | OXYGEN SATURATION: 97 % | HEIGHT: 66 IN | WEIGHT: 176.6 LBS | DIASTOLIC BLOOD PRESSURE: 78 MMHG | TEMPERATURE: 97.8 F | SYSTOLIC BLOOD PRESSURE: 128 MMHG | BODY MASS INDEX: 28.38 KG/M2

## 2021-07-26 DIAGNOSIS — E78.5 DYSLIPIDEMIA: Chronic | ICD-10-CM

## 2021-07-26 DIAGNOSIS — I10 ESSENTIAL HYPERTENSION: Chronic | ICD-10-CM

## 2021-07-26 DIAGNOSIS — Z00.00 MEDICARE ANNUAL WELLNESS VISIT, SUBSEQUENT: Primary | ICD-10-CM

## 2021-07-26 DIAGNOSIS — N18.32 STAGE 3B CHRONIC KIDNEY DISEASE (HCC): Chronic | ICD-10-CM

## 2021-07-26 DIAGNOSIS — E11.21 TYPE II DIABETES MELLITUS WITH NEPHROPATHY (HCC): Chronic | ICD-10-CM

## 2021-07-26 DIAGNOSIS — M10.9 GOUT, UNSPECIFIED CAUSE, UNSPECIFIED CHRONICITY, UNSPECIFIED SITE: ICD-10-CM

## 2021-07-26 LAB
ALBUMIN SERPL-MCNC: 4.3 G/DL (ref 3.5–5)
ALBUMIN/GLOB SERPL: 1.2 {RATIO} (ref 1.1–2.2)
ALP SERPL-CCNC: 44 U/L (ref 45–117)
ALT SERPL-CCNC: 32 U/L (ref 12–78)
ANION GAP SERPL CALC-SCNC: 7 MMOL/L (ref 5–15)
APPEARANCE UR: CLEAR
AST SERPL-CCNC: 23 U/L (ref 15–37)
BACTERIA URNS QL MICRO: NEGATIVE /HPF
BASOPHILS # BLD: 0.1 K/UL (ref 0–0.1)
BASOPHILS NFR BLD: 1 % (ref 0–1)
BILIRUB SERPL-MCNC: 0.9 MG/DL (ref 0.2–1)
BILIRUB UR QL: NEGATIVE
BUN SERPL-MCNC: 56 MG/DL (ref 6–20)
BUN/CREAT SERPL: 23 (ref 12–20)
CALCIUM SERPL-MCNC: 9.6 MG/DL (ref 8.5–10.1)
CHLORIDE SERPL-SCNC: 106 MMOL/L (ref 97–108)
CHOLEST SERPL-MCNC: 196 MG/DL
CO2 SERPL-SCNC: 24 MMOL/L (ref 21–32)
COLOR UR: NORMAL
CREAT SERPL-MCNC: 2.41 MG/DL (ref 0.7–1.3)
DIFFERENTIAL METHOD BLD: ABNORMAL
EOSINOPHIL # BLD: 0.5 K/UL (ref 0–0.4)
EOSINOPHIL NFR BLD: 10 % (ref 0–7)
EPITH CASTS URNS QL MICRO: NORMAL /LPF
ERYTHROCYTE [DISTWIDTH] IN BLOOD BY AUTOMATED COUNT: 14.6 % (ref 11.5–14.5)
EST. AVERAGE GLUCOSE BLD GHB EST-MCNC: 126 MG/DL
GLOBULIN SER CALC-MCNC: 3.5 G/DL (ref 2–4)
GLUCOSE SERPL-MCNC: 100 MG/DL (ref 65–100)
GLUCOSE UR STRIP.AUTO-MCNC: NEGATIVE MG/DL
HBA1C MFR BLD: 6 % (ref 4–5.6)
HCT VFR BLD AUTO: 42.9 % (ref 36.6–50.3)
HDLC SERPL-MCNC: 35 MG/DL
HDLC SERPL: 5.6 {RATIO} (ref 0–5)
HGB BLD-MCNC: 14 G/DL (ref 12.1–17)
HGB UR QL STRIP: NEGATIVE
HYALINE CASTS URNS QL MICRO: NORMAL /LPF (ref 0–5)
IMM GRANULOCYTES # BLD AUTO: 0 K/UL (ref 0–0.04)
IMM GRANULOCYTES NFR BLD AUTO: 0 % (ref 0–0.5)
KETONES UR QL STRIP.AUTO: NEGATIVE MG/DL
LDLC SERPL CALC-MCNC: 128.8 MG/DL (ref 0–100)
LEUKOCYTE ESTERASE UR QL STRIP.AUTO: NEGATIVE
LYMPHOCYTES # BLD: 1.8 K/UL (ref 0.8–3.5)
LYMPHOCYTES NFR BLD: 38 % (ref 12–49)
MCH RBC QN AUTO: 33.5 PG (ref 26–34)
MCHC RBC AUTO-ENTMCNC: 32.6 G/DL (ref 30–36.5)
MCV RBC AUTO: 102.6 FL (ref 80–99)
MONOCYTES # BLD: 0.6 K/UL (ref 0–1)
MONOCYTES NFR BLD: 12 % (ref 5–13)
NEUTS SEG # BLD: 1.8 K/UL (ref 1.8–8)
NEUTS SEG NFR BLD: 39 % (ref 32–75)
NITRITE UR QL STRIP.AUTO: NEGATIVE
NRBC # BLD: 0 K/UL (ref 0–0.01)
NRBC BLD-RTO: 0 PER 100 WBC
PH UR STRIP: 5 [PH] (ref 5–8)
PLATELET # BLD AUTO: 180 K/UL (ref 150–400)
PMV BLD AUTO: 11.7 FL (ref 8.9–12.9)
POTASSIUM SERPL-SCNC: 4.5 MMOL/L (ref 3.5–5.1)
PROT SERPL-MCNC: 7.8 G/DL (ref 6.4–8.2)
PROT UR STRIP-MCNC: NEGATIVE MG/DL
RBC # BLD AUTO: 4.18 M/UL (ref 4.1–5.7)
RBC #/AREA URNS HPF: NORMAL /HPF (ref 0–5)
SODIUM SERPL-SCNC: 137 MMOL/L (ref 136–145)
SP GR UR REFRACTOMETRY: 1.01 (ref 1–1.03)
TRIGL SERPL-MCNC: 161 MG/DL (ref ?–150)
TSH SERPL DL<=0.05 MIU/L-ACNC: 1.65 UIU/ML (ref 0.36–3.74)
UA: UC IF INDICATED,UAUC: NORMAL
URATE SERPL-MCNC: 5.5 MG/DL (ref 3.5–7.2)
UROBILINOGEN UR QL STRIP.AUTO: 0.2 EU/DL (ref 0.2–1)
VLDLC SERPL CALC-MCNC: 32.2 MG/DL
WBC # BLD AUTO: 4.7 K/UL (ref 4.1–11.1)
WBC URNS QL MICRO: NORMAL /HPF (ref 0–4)

## 2021-07-26 PROCEDURE — G8432 DEP SCR NOT DOC, RNG: HCPCS | Performed by: INTERNAL MEDICINE

## 2021-07-26 PROCEDURE — G8417 CALC BMI ABV UP PARAM F/U: HCPCS | Performed by: INTERNAL MEDICINE

## 2021-07-26 PROCEDURE — G0439 PPPS, SUBSEQ VISIT: HCPCS | Performed by: INTERNAL MEDICINE

## 2021-07-26 PROCEDURE — 3044F HG A1C LEVEL LT 7.0%: CPT | Performed by: INTERNAL MEDICINE

## 2021-07-26 PROCEDURE — G8427 DOCREV CUR MEDS BY ELIG CLIN: HCPCS | Performed by: INTERNAL MEDICINE

## 2021-07-26 PROCEDURE — G8752 SYS BP LESS 140: HCPCS | Performed by: INTERNAL MEDICINE

## 2021-07-26 PROCEDURE — G8754 DIAS BP LESS 90: HCPCS | Performed by: INTERNAL MEDICINE

## 2021-07-26 PROCEDURE — G8536 NO DOC ELDER MAL SCRN: HCPCS | Performed by: INTERNAL MEDICINE

## 2021-07-26 PROCEDURE — 99214 OFFICE O/P EST MOD 30 MIN: CPT | Performed by: INTERNAL MEDICINE

## 2021-07-26 PROCEDURE — 2022F DILAT RTA XM EVC RTNOPTHY: CPT | Performed by: INTERNAL MEDICINE

## 2021-07-26 PROCEDURE — 3017F COLORECTAL CA SCREEN DOC REV: CPT | Performed by: INTERNAL MEDICINE

## 2021-07-26 PROCEDURE — 1101F PT FALLS ASSESS-DOCD LE1/YR: CPT | Performed by: INTERNAL MEDICINE

## 2021-07-26 NOTE — PROGRESS NOTES
Winston Costello is a 66 y.o. male and presents with Annual Wellness Visit and Follow Up Chronic Condition  . Subjective:  Mr. Obdulia Martinez returns to the office today for Medicare wellness check and follow-up of multiple medical problems. The patient has type 2 diabetes mellitus with nephropathy. He is currently managing this with diet. At his last office visit his A1c had increased over 7. He is following a better diet and exercise and is A1c dropped to approximately 6.2. He checks his blood sugars once daily and average fasting blood sugars have been less than 125. He denies polyuria, polydipsia or blurred vision. He states that he is up-to-date on diabetic retinal eye exam.    He is on fenofibrate for his hypercholesterolemia. He supplements this with fish oil. He has no history of coronary artery disease and denies exertional chest pains or claudication. He is on low-dose lisinopril for his hypertension. Tolerates this without cough, rash or orthostatic dizziness. He denies headaches, numbness, tingling or focal neurological problems. He has chronic kidney disease and also has a history of gout. He is on allopurinol for his gout and has not had any attacks within the last 6 months. He does have Colcrys to take on a as needed basis.     Past Medical History:   Diagnosis Date    Acute idiopathic pericarditis 5/23/2018    Finding of rib structure     right lower rib cartilage weak    Hypertension     Other ill-defined conditions(799.89)     gout    Type II diabetes mellitus with nephropathy ([facility] [address] 9/18/2019    Unspecified adverse effect of anesthesia     lays on back,chokes on drainage    Unspecified sleep apnea     septum surgery to fix, still an issue     Past Surgical History:   Procedure Laterality Date    HX KNEE ARTHROSCOPY      HX OTHER SURGICAL      kidney stones     No Known Allergies  Current Outpatient Medications   Medication Sig Dispense Refill    fenofibrate (LOFIBRA) 160 mg tablet TAKE 1 TABLET DAILY WITH LUNCH 90 Tab 3    lisinopriL (PRINIVIL, ZESTRIL) 2.5 mg tablet TAKE 1 TABLET DAILY 90 Tab 3    allopurinoL (ZYLOPRIM) 100 mg tablet TAKE 2 TABLETS DAILY 180 Tab 3    aspirin (ASPIRIN) 325 mg tablet Take 1 Tab by mouth nightly.  COLCRYS 0.6 mg tablet Take 1 Tab by mouth two (2) times a day. (Patient taking differently: Take 0.6 mg by mouth two (2) times daily as needed.) 60 Tab 3    Potassium Citrate (UROCIT-K) TbER tablet Take 15 mEq by mouth daily (with lunch).  vitamin E (AQUA GEMS) 400 unit capsule Take 400 Units by mouth daily (with lunch).  omega 3-dha-epa-fish oil (FISH OIL) 100-160-1,000 mg cap Take 1,000 mg by mouth daily (with lunch).  cholecalciferol (VITAMIN D3) 1,000 unit tablet Take 500 Units by mouth two (2) times a day.  ascorbic acid, vitamin C, (VITAMIN C) 500 mg tablet Take 500 mg by mouth daily (with lunch). Social History     Socioeconomic History    Marital status:      Spouse name: Not on file    Number of children: Not on file    Years of education: Not on file    Highest education level: Not on file   Tobacco Use    Smoking status: Never Smoker    Smokeless tobacco: Never Used   Vaping Use    Vaping Use: Never used   Substance and Sexual Activity    Alcohol use: No    Drug use: No     Social Determinants of Health     Financial Resource Strain:     Difficulty of Paying Living Expenses:    Food Insecurity:     Worried About Running Out of Food in the Last Year:     [address] in the Last Year:    Transportation Needs:     Lack of Transportation (Medical):      Lack of Transportation (Non-Medical):    Physical Activity:     Days of Exercise per Week:     Minutes of Exercise per Session:    Stress:     Feeling of Stress :    Social Connections:     Frequency of Communication with Friends and Family:     Frequency of Social Gatherings with Friends and Family:     Attends Church Services:     Active Member of Clubs or Organizations:     Attends Club or Organization Meetings:     Marital Status:      Family History   Problem Relation Age of Onset    Diabetes Mother     Hypertension Father        Health Maintenance   Topic Date Due    Eye Exam Retinal or Dilated  Never done    DTaP/Tdap/Td series (1 - Tdap) Never done    Shingrix Vaccine Age 50> (1 of 2) Never done    Pneumococcal 65+ years (1 of 1 - PPSV23) Never done    Foot Exam Q1  09/18/2020    Flu Vaccine (1) 09/01/2021    MICROALBUMIN Q1  01/21/2022    Lipid Screen  01/21/2022    A1C test (Diabetic or Prediabetic)  04/22/2022    Medicare Yearly Exam  07/27/2022    Colorectal Cancer Screening Combo  08/06/2023    Hepatitis C Screening  Completed    COVID-19 Vaccine  Completed        Review of Systems  Constitutional: negative for fevers, chills, anorexia and weight loss  Eyes:   negative for visual disturbance and irritation  ENT:   negative for tinnitus,sore throat,nasal congestion,ear pain,hoarseness  Respiratory:  negative for cough, hemoptysis, dyspnea,wheezing  CV:   negative for chest pain, palpitations, lower extremity edema  GI:   negative for nausea, vomiting, diarrhea, abdominal pain,melena  Endo:               negative for polyuria,polydipsia,polyphagia,heat intolerance  Genitourinary: negative for frequency, dysuria and hematuria  Integumentary: negative for rash and pruritus  Hematologic:  negative for easy bruising and gum/nose bleeding  Musculoskel: negative for myalgias, arthralgias, back pain, muscle weakness, joint pain  Neurological:  negative for headaches, dizziness, vertigo, memory problems and gait   Behavl/Psych: negative for feelings of anxiety, depression, mood changes  ROS otherwise negative      Objective:  Visit Vitals  /78 (BP 1 Location: Left upper arm, BP Patient Position: Sitting, BP Cuff Size: Adult)   Pulse 75   Temp 97.8 °F (36.6 °C) (Oral)   Resp 16   Ht 5' 6\" (1.676 m)   Wt 176 lb 9.6 oz (80.1 kg)   SpO2 97%   BMI 28.50 kg/m²     Body mass index is 28.5 kg/m². Physical Exam:   General appearance - alert, well appearing, and in no distress  Mental status - alert, oriented to person, place, and time  EYE-YOLIS, EOMI,conjunctiva normal bilaterally, lids normal  ENT-ENT exam normal, no neck nodes or sinus tenderness  Nose - normal and patent, no erythema,  Or discharge   Mouth - mucous membranes moist, pharynx normal without lesions  Neck - supple, no significant adenopathy or bruit  Chest - clear to auscultation, no wheezes, rales or rhonchi. Heart - normal rate, regular rhythm, normal S1, S2, no murmurs, rubs, clicks or gallops   Abdomen - soft, nontender, nondistended, no masses or organomegaly  Lymph- no adenopathy palpable  Ext-peripheral pulses normal, no pedal edema, no clubbing or cyanosis  Skin-Warm and dry. no hyperpigmentation, vitiligo, or suspicious lesions  Neuro -alert, oriented, normal speech, no focal findings or movement disorder noted    In addition this patient is seen for AWV  as detailed below: This is a Subsequent Medicare Annual Wellness Exam (AWV) (Performed 12 months after IPPE or effective date of Medicare Part B enrollment)    I have reviewed the patient's medical history in detail and updated the computerized patient record. Problem list reviewed with patient and risk factors discussed. PSH, SH, FH, Medications and HM issues also reviewed and discussed. Depression screen, fall risk assessment, functional abilities and ACP also reviewed and discussed as above and below. Depression Risk Factor Screening:     3 most recent PHQ Screens 1/21/2021   Little interest or pleasure in doing things Not at all   Feeling down, depressed, irritable, or hopeless Not at all   Total Score PHQ 2 0     Alcohol Risk Factor Screening: You do not drink alcohol or very rarely. Functional Ability and Level of Safety:   Hearing Loss  Hearing is good.     Activities of Daily Living  The home contains: no safety equipment. Patient does total self care    Fall Risk  Fall Risk Assessment, last 12 mths 1/21/2021   Able to walk? Yes   Fall in past 12 months? 0   Do you feel unsteady? 0   Are you worried about falling 0       Abuse Screen  Patient is not abused    Cognitive Screening   Evaluation of Cognitive Function:  Has your family/caregiver stated any concerns about your memory: no  Normal    Patient Care Team   Patient Care Team:  Kristine Yoon MD as PCP - General (Internal Medicine)  Krist[facility]CP [facility]  Kingsley Krause MD as Physician (Cardiology)  Kash Cobb MD (Nephrology)  Cally Arroyo MD (Urology)    Assessment/Plan   Education and counseling provided:  Are appropriate based on today's review and evaluation    Assessment/Plan:   Impressions:      ICD-10-CM ICD-9-CM    1. Medicare annual wellness visit, subsequent  Z00.00 V70.0    2. Essential hypertension  I10 401.9 COLLECTION VENOUS BLOOD,VENIPUNCTURE      CBC WITH AUTOMATED DIFF      METABOLIC PANEL, COMPREHENSIVE      URINALYSIS W/ REFLEX CULTURE   3. Dyslipidemia  E78.5 272.4 LIPID PANEL      TSH 3RD GENERATION   4. Type II diabetes mellitus with nephropathy (HCC)  E11.21 250.40 HEMOGLOBIN A1C WITH EAG     583.81 MICROALBUMIN, UR, RAND W/ MICROALB/CREAT RATIO   5. Gout, unspecified cause, unspecified chronicity, unspecified site  M10.9 274.9 URIC ACID   6. Stage 3b chronic kidney disease ([facility] [address]  N18.32 585. 3         Plan:  1. Continue present meds  2. Lifestyle modifications including Na restriction, low carb/fat diet, weight reduction and exercise (at least a walking program). Follow-up and Dispositions    · Return in about 6 months (around 1/26/2022).            Orders Placed This Encounter    CBC WITH AUTOMATED DIFF     Standing Status:   Future     Standing Expiration Date:   7/26/2022    HEMOGLOBIN A1C WITH EAG     Standing Status: Future     Standing Expiration Date:   7/26/2022    LIPID PANEL     Standing Status:   Future     Standing Expiration Date:   2/75/8991    METABOLIC PANEL, COMPREHENSIVE     Standing Status:   Future     Standing Expiration Date:   7/26/2022    MICROALBUMIN, UR, RAND W/ MICROALB/CREAT RATIO     Standing Status:   Future     Standing Expiration Date:   7/26/2022    TSH 3RD GENERATION     Standing Status:   Future     Standing Expiration Date:   7/26/2022    URIC ACID     Standing Status:   Future     Standing Expiration Date:   7/26/2022    URINALYSIS W/ REFLEX CULTURE     Standing Status:   Future     Standing Expiration Date:   7/26/2022    COLLECTION VENOUS BLOOD,VENIPUNCTURE       Chava Cisse MD   Assessment/Plan:  Diagnoses and all orders for this visit:    Medicare annual wellness visit, subsequent    Essential hypertension  -     COLLECTION VENOUS BLOOD,VENIPUNCTURE  -     CBC WITH AUTOMATED DIFF; Future  -     METABOLIC PANEL, COMPREHENSIVE; Future  -     URINALYSIS W/ REFLEX CULTURE; Future    Dyslipidemia  -     LIPID PANEL; Future  -     TSH 3RD GENERATION; Future    Type II diabetes mellitus with nephropathy (HCC)  -     HEMOGLOBIN A1C WITH EAG; Future  -     MICROALBUMIN, UR, RAND W/ MICROALB/CREAT RATIO; Future    Gout, unspecified cause, unspecified chronicity, unspecified site  -     URIC ACID; Future    Stage 3b chronic kidney disease [facility])        Health Maintenance Due   Topic Date Due    Eye Exam Retinal or Dilated  Never done    DTaP/Tdap/Td series (1 - Tdap) Never done    Shingrix Vaccine Age 50> (1 of 2) Never done    Pneumococcal 65+ years (1 of 1 - PPSV23) Never done    Foot Exam Q1  09/18/2020       Other instructions: The patient's medications were reviewed and reconciled. No change in his current medical regimen will be made.     A no added salt, prudent diet is encouraged    Continue to check blood sugars once daily    Health maintenance issues were reviewed and are up-to-date    Age-appropriate vaccinations were reviewed and Pneumovax 23, shingles vaccine and Tdap vaccine have been recommended. Await results of multiple labs    Follow-up in 6 months    Follow-up and Dispositions    · Return in about 6 months (around 1/26/2022). I have reviewed with the patient details of the assessment and plan and all questions were answered. Relevent patient education was performed. The most recent lab findings were reviewed with the patient. An After Visit Summary was printed and given to the patient. Deanna Rai MD    Please note that this dictation was completed with [employer], the computer voice recognition software. Quite often unanticipated grammatical, syntax, homophones, and other interpretive errors are inadvertently transcribed by the computer software. Please disregard these errors. Please excuse any errors that have escaped final proofreading.

## 2021-07-26 NOTE — PATIENT INSTRUCTIONS
Learning About Carbohydrate (Carb) Counting and Eating Out When You Have Diabetes  Why plan your meals? Meal planning can be a key part of managing diabetes. Planning meals and snacks with the right balance of carbohydrate, protein, and fat can help you keep your blood sugar at the target level you set with your doctor. You don't have to eat special foods. You can eat what your family eats, including sweets once in a while. But you do have to pay attention to how often you eat and how much you eat of certain foods. You may want to work with a dietitian or a certified diabetes educator. He or she can give you tips and meal ideas and can answer your questions about meal planning. This health professional can also help you reach a healthy weight if that is one of your goals. What should you know about eating carbs? Managing the amount of carbohydrate (carbs) you eat is an important part of healthy meals when you have diabetes. Carbohydrate is found in many foods. · Learn which foods have carbs. And learn the amounts of carbs in different foods. ? Bread, cereal, pasta, and rice have about 15 grams of carbs in a serving. A serving is 1 slice of bread (1 ounce), ½ cup of cooked cereal, or 1/3 cup of cooked pasta or rice. ? Fruits have 15 grams of carbs in a serving. A serving is 1 small fresh fruit, such as an apple or orange; ½ of a banana; ½ cup of cooked or canned fruit; ½ cup of fruit juice; 1 cup of melon or raspberries; or 2 tablespoons of dried fruit. ? Milk and no-sugar-added yogurt have 15 grams of carbs in a serving. A serving is 1 cup of milk or 2/3 cup of no-sugar-added yogurt. ? Starchy vegetables have 15 grams of carbs in a serving. A serving is ½ cup of mashed potatoes or sweet potato; 1 cup winter squash; ½ of a small baked potato; ½ cup of cooked beans; or ½ cup cooked corn or green peas.   · Learn how much carbs to eat each day and at each meal. A dietitian or CDE can teach you how to keep track of the amount of carbs you eat. This is called carbohydrate counting. · If you are not sure how to count carbohydrate grams, use the Plate Method to plan meals. It is a good, quick way to make sure that you have a balanced meal. It also helps you spread carbs throughout the day. ? Divide your plate by types of foods. Put non-starchy vegetables on half the plate, meat or other protein food on one-quarter of the plate, and a grain or starchy vegetable in the final quarter of the plate. To this you can add a small piece of fruit and 1 cup of milk or yogurt, depending on how many carbs you are supposed to eat at a meal.  · Try to eat about the same amount of carbs at each meal. Do not \"save up\" your daily allowance of carbs to eat at one meal.  · Proteins have very little or no carbs per serving. Examples of proteins are beef, chicken, turkey, fish, eggs, tofu, cheese, cottage cheese, and peanut butter. A serving size of meat is 3 ounces, which is about the size of a deck of cards. Examples of meat substitute serving sizes (equal to 1 ounce of meat) are 1/4 cup of cottage cheese, 1 egg, 1 tablespoon of peanut butter, and ½ cup of tofu. How can you eat out and still eat healthy? · Learn to estimate the serving sizes of foods that have carbohydrate. If you measure food at home, it will be easier to estimate the amount in a serving of restaurant food. · If the meal you order has too much carbohydrate (such as potatoes, corn, or baked beans), ask to have a low-carbohydrate food instead. Ask for a salad or green vegetables. · If you use insulin, check your blood sugar before and after eating out to help you plan how much to eat in the future. · If you eat more carbohydrate at a meal than you had planned, take a walk or do other exercise. This will help lower your blood sugar. What are some tips for eating healthy? · Limit saturated fat, such as the fat from meat and dairy products.  This is a healthy choice because people who have diabetes are at higher risk of heart disease. So choose lean cuts of meat and nonfat or low-fat dairy products. Use olive or canola oil instead of butter or shortening when cooking. · Don't skip meals. Your blood sugar may drop too low if you skip meals and take insulin or certain medicines for diabetes. · Check with your doctor before you drink alcohol. Alcohol can cause your blood sugar to drop too low. Alcohol can also cause a bad reaction if you take certain diabetes medicines. Follow-up care is a key part of your treatment and safety. Be sure to make and go to all appointments, and call your doctor if you are having problems. It's also a good idea to know your test results and keep a list of the medicines you take. Where can you learn more? Go to http://www.pelletier.com/  Enter I147 in the search box to learn more about \"Learning About Carbohydrate (Carb) Counting and Eating Out When You Have Diabetes. \"  Current as of: August 31, 2020               Content Version: 12.8  © 2006-2021 [employer]. Care instructions adapted under license by [employer] (which disclaims liability or warranty for this information). If you have questions about a medical condition or this instruction, always ask your healthcare professional. [address] any warranty or liability for your use of this information. The best way to stay healthy is to live a healthy lifestyle. A healthy lifestyle includes regular exercise, eating a well-balanced diet, keeping a healthy weight and not smoking. Regular physical exams and screening tests are another important way to take care of yourself. Preventive exams provided by health care providers can find health problems early when treatment works best and can keep you from getting certain diseases or illnesses.  Preventive services include exams, lab tests, screenings, shots, monitoring and information to help you take care of your own health. All people over 65 should have a pneumonia shot. Pneumonia shots are usually only needed once in a lifetime unless your doctor decides differently. In addition to your physical exam, some screening tests are recommended:    All people over 65 should have a yearly flu shot. People over 65 are at medium to high risk for Hepatitis B. Three shots are needed for complete protection. Bone mass measurement (dexa scan) is recommended every two years. Diabetes Mellitus screening is recommended every year. Glaucoma is an eye disease caused by high pressure in the eye. An eye exam is recommended every year. Cardiovascular screening tests that check your cholesterol and other blood fat (lipid) levels are recommended every five years. Colorectal Cancer screening tests help to find pre-cancerous polyps (growths in the colon) so they can be removed before they turn into cancer. Tests ordered for screening depend on your personal and family history risk factors. Prostate Cancer Screening (annually up to age 76)    Screening for breast cancer is recommended yearly with a Mammogram.    Screening for cervical and vaginal cancer is recommended with a pelvic and Pap test every two years. However if you have had an abnormal pap in the past  three years or at high risk for cervical or vaginal cancer Medicare will cover a pap test and a pelvic exam every year.      Here is a list of your current Health Maintenance items with a due date:  Health Maintenance Due   Topic Date Due    Eye Exam  Never done    DTaP/Tdap/Td  (1 - Tdap) Never done    Shingles Vaccine (1 of 2) Never done    Pneumococcal Vaccine (1 of 1 - PPSV23) Never done    Diabetic Foot Care  09/18/2020

## 2021-07-26 NOTE — PROGRESS NOTES
Chief Complaint   Patient presents with    Annual Wellness Visit    Follow Up Chronic Condition       Depression Risk Factor Screening:     3 most recent PHQ Screens 1/21/2021   Little interest or pleasure in doing things Not at all   Feeling down, depressed, irritable, or hopeless Not at all   Total Score PHQ 2 0       Functional Ability and Level of Safety:     Activities of Daily Living  ADL Assessment 1/21/2021   Feeding yourself No Help Needed   Getting from bed to chair No Help Needed   Getting dressed No Help Needed   Bathing or showering No Help Needed   Walk across the room (includes cane/walker) No Help Needed   Using the telphone No Help Needed   Taking your medications No Help Needed   Preparing meals No Help Needed   Managing money (expenses/bills) No Help Needed   Moderately strenuous housework (laundry) No Help Needed   Shopping for personal items (toiletries/medicines) No Help Needed   Shopping for groceries No Help Needed   Driving No Help Needed   Climbing a flight of stairs No Help Needed   Getting to places beyond walking distances No Help Needed       Fall Risk  Fall Risk Assessment, last 12 mths 1/21/2021   Able to walk? Yes   Fall in past 12 months? 0   Do you feel unsteady? 0   Are you worried about falling 0       Abuse Screen  Abuse Screening Questionnaire 1/21/2021   Do you ever feel afraid of your partner? N   Are you in a relationship with someone who physically or mentally threatens you? N   Is it safe for you to go home?  Y         Patient Care Team   Patient Care Team:  Bubba Bay MD as PCP - General (Internal Medicine)  Bubba Bay MD as PCP - [facility]  Kaylynn Hannon MD as Physician (Cardiology)  Garrick Avila MD (Nephrology)  Wally Nobles MD (Urology)

## 2021-07-27 LAB
CREAT UR-MCNC: 64.4 MG/DL
MICROALBUMIN UR-MCNC: <0.5 MG/DL
MICROALBUMIN/CREAT UR-RTO: NORMAL MG/G (ref 0–30)

## 2021-07-27 NOTE — PROGRESS NOTES
A1c 6.0  LDL elevated at 129  Consider start of nexlizet to reduce cardiac risk - will have to check with insurance or pharmacist regarding cost as it is a brand name drug

## 2022-01-27 ENCOUNTER — OFFICE VISIT (OUTPATIENT)
Dept: INTERNAL MEDICINE CLINIC | Age: 72
End: 2022-01-27
Payer: MEDICARE

## 2022-01-27 VITALS
HEIGHT: 66 IN | SYSTOLIC BLOOD PRESSURE: 128 MMHG | RESPIRATION RATE: 18 BRPM | OXYGEN SATURATION: 98 % | DIASTOLIC BLOOD PRESSURE: 80 MMHG | TEMPERATURE: 97.7 F | WEIGHT: 183.8 LBS | BODY MASS INDEX: 29.54 KG/M2 | HEART RATE: 66 BPM

## 2022-01-27 DIAGNOSIS — N18.32 STAGE 3B CHRONIC KIDNEY DISEASE (HCC): Chronic | ICD-10-CM

## 2022-01-27 DIAGNOSIS — E78.5 DYSLIPIDEMIA: Chronic | ICD-10-CM

## 2022-01-27 DIAGNOSIS — I10 PRIMARY HYPERTENSION: Chronic | ICD-10-CM

## 2022-01-27 DIAGNOSIS — E11.21 TYPE II DIABETES MELLITUS WITH NEPHROPATHY (HCC): Primary | Chronic | ICD-10-CM

## 2022-01-27 DIAGNOSIS — M10.9 GOUT, UNSPECIFIED CAUSE, UNSPECIFIED CHRONICITY, UNSPECIFIED SITE: ICD-10-CM

## 2022-01-27 LAB
ALBUMIN SERPL-MCNC: 4.1 G/DL (ref 3.5–5)
ALBUMIN/GLOB SERPL: 1.2 {RATIO} (ref 1.1–2.2)
ALP SERPL-CCNC: 54 U/L (ref 45–117)
ALT SERPL-CCNC: 31 U/L (ref 12–78)
ANION GAP SERPL CALC-SCNC: 3 MMOL/L (ref 5–15)
APPEARANCE UR: CLEAR
AST SERPL-CCNC: 19 U/L (ref 15–37)
BACTERIA URNS QL MICRO: NEGATIVE /HPF
BASOPHILS # BLD: 0.1 K/UL (ref 0–0.1)
BASOPHILS NFR BLD: 1 % (ref 0–1)
BILIRUB SERPL-MCNC: 0.6 MG/DL (ref 0.2–1)
BILIRUB UR QL: NEGATIVE
BUN SERPL-MCNC: 38 MG/DL (ref 6–20)
BUN/CREAT SERPL: 18 (ref 12–20)
CALCIUM SERPL-MCNC: 9.5 MG/DL (ref 8.5–10.1)
CHLORIDE SERPL-SCNC: 109 MMOL/L (ref 97–108)
CHOLEST SERPL-MCNC: 178 MG/DL
CO2 SERPL-SCNC: 26 MMOL/L (ref 21–32)
COLOR UR: NORMAL
CREAT SERPL-MCNC: 2.12 MG/DL (ref 0.7–1.3)
CREAT UR-MCNC: 90.6 MG/DL
DIFFERENTIAL METHOD BLD: ABNORMAL
EOSINOPHIL # BLD: 0.5 K/UL (ref 0–0.4)
EOSINOPHIL NFR BLD: 10 % (ref 0–7)
EPITH CASTS URNS QL MICRO: NORMAL /LPF
ERYTHROCYTE [DISTWIDTH] IN BLOOD BY AUTOMATED COUNT: 13.9 % (ref 11.5–14.5)
EST. AVERAGE GLUCOSE BLD GHB EST-MCNC: 140 MG/DL
GLOBULIN SER CALC-MCNC: 3.4 G/DL (ref 2–4)
GLUCOSE SERPL-MCNC: 126 MG/DL (ref 65–100)
GLUCOSE UR STRIP.AUTO-MCNC: NEGATIVE MG/DL
HBA1C MFR BLD: 6.5 % (ref 4–5.6)
HCT VFR BLD AUTO: 44.5 % (ref 36.6–50.3)
HDLC SERPL-MCNC: 36 MG/DL
HDLC SERPL: 4.9 {RATIO} (ref 0–5)
HGB BLD-MCNC: 14.2 G/DL (ref 12.1–17)
HGB UR QL STRIP: NEGATIVE
HYALINE CASTS URNS QL MICRO: NORMAL /LPF (ref 0–5)
IMM GRANULOCYTES # BLD AUTO: 0 K/UL (ref 0–0.04)
IMM GRANULOCYTES NFR BLD AUTO: 0 % (ref 0–0.5)
KETONES UR QL STRIP.AUTO: NEGATIVE MG/DL
LDLC SERPL CALC-MCNC: 100.2 MG/DL (ref 0–100)
LEUKOCYTE ESTERASE UR QL STRIP.AUTO: NEGATIVE
LYMPHOCYTES # BLD: 1.7 K/UL (ref 0.8–3.5)
LYMPHOCYTES NFR BLD: 35 % (ref 12–49)
MCH RBC QN AUTO: 33.4 PG (ref 26–34)
MCHC RBC AUTO-ENTMCNC: 31.9 G/DL (ref 30–36.5)
MCV RBC AUTO: 104.7 FL (ref 80–99)
MICROALBUMIN UR-MCNC: <0.5 MG/DL
MICROALBUMIN/CREAT UR-RTO: <6 MG/G (ref 0–30)
MONOCYTES # BLD: 0.5 K/UL (ref 0–1)
MONOCYTES NFR BLD: 11 % (ref 5–13)
NEUTS SEG # BLD: 2 K/UL (ref 1.8–8)
NEUTS SEG NFR BLD: 43 % (ref 32–75)
NITRITE UR QL STRIP.AUTO: NEGATIVE
NRBC # BLD: 0 K/UL (ref 0–0.01)
NRBC BLD-RTO: 0 PER 100 WBC
PH UR STRIP: 5 [PH] (ref 5–8)
PLATELET # BLD AUTO: 168 K/UL (ref 150–400)
PMV BLD AUTO: 12.5 FL (ref 8.9–12.9)
POTASSIUM SERPL-SCNC: 4.7 MMOL/L (ref 3.5–5.1)
PROT SERPL-MCNC: 7.5 G/DL (ref 6.4–8.2)
PROT UR STRIP-MCNC: NEGATIVE MG/DL
RBC # BLD AUTO: 4.25 M/UL (ref 4.1–5.7)
RBC #/AREA URNS HPF: NORMAL /HPF (ref 0–5)
SODIUM SERPL-SCNC: 138 MMOL/L (ref 136–145)
SP GR UR REFRACTOMETRY: 1.01 (ref 1–1.03)
TRIGL SERPL-MCNC: 209 MG/DL (ref ?–150)
TSH SERPL DL<=0.05 MIU/L-ACNC: 2.38 UIU/ML (ref 0.36–3.74)
UA: UC IF INDICATED,UAUC: NORMAL
URATE SERPL-MCNC: 5.4 MG/DL (ref 3.5–7.2)
UROBILINOGEN UR QL STRIP.AUTO: 0.2 EU/DL (ref 0.2–1)
VLDLC SERPL CALC-MCNC: 41.8 MG/DL
WBC # BLD AUTO: 4.7 K/UL (ref 4.1–11.1)
WBC URNS QL MICRO: NORMAL /HPF (ref 0–4)

## 2022-01-27 PROCEDURE — 99214 OFFICE O/P EST MOD 30 MIN: CPT | Performed by: INTERNAL MEDICINE

## 2022-01-27 PROCEDURE — G8419 CALC BMI OUT NRM PARAM NOF/U: HCPCS | Performed by: INTERNAL MEDICINE

## 2022-01-27 PROCEDURE — 1101F PT FALLS ASSESS-DOCD LE1/YR: CPT | Performed by: INTERNAL MEDICINE

## 2022-01-27 PROCEDURE — 2022F DILAT RTA XM EVC RTNOPTHY: CPT | Performed by: INTERNAL MEDICINE

## 2022-01-27 PROCEDURE — G8754 DIAS BP LESS 90: HCPCS | Performed by: INTERNAL MEDICINE

## 2022-01-27 PROCEDURE — G8427 DOCREV CUR MEDS BY ELIG CLIN: HCPCS | Performed by: INTERNAL MEDICINE

## 2022-01-27 PROCEDURE — 3017F COLORECTAL CA SCREEN DOC REV: CPT | Performed by: INTERNAL MEDICINE

## 2022-01-27 PROCEDURE — G8510 SCR DEP NEG, NO PLAN REQD: HCPCS | Performed by: INTERNAL MEDICINE

## 2022-01-27 PROCEDURE — 3046F HEMOGLOBIN A1C LEVEL >9.0%: CPT | Performed by: INTERNAL MEDICINE

## 2022-01-27 PROCEDURE — G8752 SYS BP LESS 140: HCPCS | Performed by: INTERNAL MEDICINE

## 2022-01-27 PROCEDURE — G8536 NO DOC ELDER MAL SCRN: HCPCS | Performed by: INTERNAL MEDICINE

## 2022-01-27 NOTE — PROGRESS NOTES
Subjective:     Mr. Bertin Franco returns to the office today in follow-up of multiple medical problems. The patient has type 2 diabetes mellitus currently managed with diet. He does not check his blood sugars. He is up-to-date on diabetic retinal eye exam.  He denies polyuria, polydipsia or blurred vision. There is been no unexplained weight loss. Blood pressure currently managed on lisinopril. Tolerates this without cough, orthostatic dizziness or lower extremity edema. Has had no headaches, numbness, tingling or focal neurological problems. Dyslipidemia currently managed on fenofibrate. He does take a daily aspirin. He has no history of vascular disease and denies exertional chest pains or claudication. There is a history of hyperuricemia with recurrent kidney stones and gout. He remains on allopurinol. He is tolerating this without side effect. He has had no recent exacerbation of this problem. Past Medical History:   Diagnosis Date    Acute idiopathic pericarditis 5/23/2018    Finding of rib structure     right lower rib cartilage weak    Hypertension     Other ill-defined conditions(799.89)     gout    Type II diabetes mellitus with nephropathy ([facility] 9/18/2019    Unspecified adverse effect of anesthesia     lays on back,chokes on drainage    Unspecified sleep apnea     septum surgery to fix, still an issue     Past Surgical History:   Procedure Laterality Date    HX KNEE ARTHROSCOPY      HX OTHER SURGICAL      kidney stones     No Known Allergies  Current Outpatient Medications   Medication Sig Dispense Refill    lisinopriL (PRINIVIL, ZESTRIL) 2.5 mg tablet TAKE 1 TABLET DAILY 90 Tablet 1    allopurinoL (ZYLOPRIM) 100 mg tablet TAKE 2 TABLETS DAILY 180 Tablet 1    fenofibrate (LOFIBRA) 160 mg tablet TAKE 1 TABLET DAILY WITH LUNCH 90 Tablet 1    aspirin (ASPIRIN) 325 mg tablet Take 1 Tab by mouth nightly.  COLCRYS 0.6 mg tablet Take 1 Tab by mouth two (2) times a day. (Patient taking differently: Take 0.6 mg by mouth two (2) times daily as needed.) 60 Tab 3    Potassium Citrate (UROCIT-K) TbER tablet Take 15 mEq by mouth daily (with lunch).  vitamin E (AQUA GEMS) 400 unit capsule Take 400 Units by mouth daily (with lunch).  cholecalciferol (VITAMIN D3) 1,000 unit tablet Take 500 Units by mouth two (2) times a day.  ascorbic acid, vitamin C, (VITAMIN C) 500 mg tablet Take 500 mg by mouth daily (with lunch). Social History     Socioeconomic History    Marital status:    Tobacco Use    Smoking status: Never Smoker    Smokeless tobacco: Never Used   Vaping Use    Vaping Use: Never used   Substance and Sexual Activity    Alcohol use: No    Drug use: No     Family History   Problem Relation Age of Onset    Diabetes Mother     Hypertension Father        Review of Systems:  GEN: no weight loss, weight gain, fatigue or night sweats  CV: no PND, orthopnea, or palpitations  Resp: no dyspnea on exertion, no cough  Abd: no nausea, vomiting or diarrhea  EXT: denies edema, claudication  Endocrine: no hair loss, excessive thirst or polyuria  Neurological ROS: no TIA or stroke symptoms  ROS otherwise negative      Objective:     Visit Vitals  /80 (BP 1 Location: Left upper arm, BP Patient Position: Sitting, BP Cuff Size: Adult)   Pulse 66   Temp 97.7 °F (36.5 °C) (Oral)   Resp 18   Ht 5' 6\" (1.676 m)   Wt 183 lb 12.8 oz (83.4 kg)   SpO2 98%   BMI 29.67 kg/m²     Body mass index is 29.67 kg/m². General:   alert, cooperative and no distress   Eyes: conjunctivae/sclerae clear. PERRL, EOM's intact   Mouth:  No oral lesions, no pharyngeal erythema, no exudates   Neck: Trachea midline, no thyromegaly, no bruits   Heart: S1 and S2 normal,no murmurs noted    Lungs: Clear to auscultation bilaterally, no increased work of breathing   Abdomen: Soft, nontender.   Normal bowel sounds   Extremities: No edema or cyanosis   Neuro: ..alert, oriented x3,speech normal in context and clarity, cranial nerves II-XII intact,motor strength: full proximally and distally,gait: normal  reflexes: full and symmetric     Physical exam otherwise negative         Assessment/Plan:     Diagnoses and all orders for this visit:    Type II diabetes mellitus with nephropathy ([facility]  -     HEMOGLOBIN A1C WITH EAG; Future  -     MICROALBUMIN, UR, RAND W/ MICROALB/CREAT RATIO; Future    Stage 3b chronic kidney disease ([facility]    Primary hypertension  -     COLLECTION VENOUS BLOOD,VENIPUNCTURE  -     CBC WITH AUTOMATED DIFF; Future  -     METABOLIC PANEL, COMPREHENSIVE; Future  -     URINALYSIS W/ REFLEX CULTURE; Future    Dyslipidemia  -     LIPID PANEL; Future  -     TSH 3RD GENERATION; Future    Gout, unspecified cause, unspecified chronicity, unspecified site  -     URIC ACID; Future        Other instructions: The patient's medications were reviewed and reconciled. No change in his current medical regimen will be made. A no added salt, prudent diet is encouraged    Body mass index 29.7 and weight loss is encouraged    Patient up-to-date on Covid19 vaccination and booster    Await results of multiple labs    Follow-up in 6 months    Follow-up and Dispositions    · Return in about 6 months (around 7/27/2022). Stephanie Morel MD    Please note that this dictation was completed with [employer], the computer voice recognition software. Quite often unanticipated grammatical, syntax, homophones, and other interpretive errors are inadvertently transcribed by the computer software. Please disregard these errors. Please excuse any errors that have escaped final proofreading.

## 2022-01-27 NOTE — PATIENT INSTRUCTIONS
Learning About Carbohydrate (Carb) Counting and Eating Out When You Have Diabetes  Why plan your meals? Meal planning can be a key part of managing diabetes. Planning meals and snacks with the right balance of carbohydrate, protein, and fat can help you keep your blood sugar at the target level you set with your doctor. You don't have to eat special foods. You can eat what your family eats, including sweets once in a while. But you do have to pay attention to how often you eat and how much you eat of certain foods. You may want to work with a dietitian or a certified diabetes educator. He or she can give you tips and meal ideas and can answer your questions about meal planning. This health professional can also help you reach a healthy weight if that is one of your goals. What should you know about eating carbs? Managing the amount of carbohydrate (carbs) you eat is an important part of healthy meals when you have diabetes. Carbohydrate is found in many foods. · Learn which foods have carbs. And learn the amounts of carbs in different foods. ? Bread, cereal, pasta, and rice have about 15 grams of carbs in a serving. A serving is 1 slice of bread (1 ounce), ½ cup of cooked cereal, or 1/3 cup of cooked pasta or rice. ? Fruits have 15 grams of carbs in a serving. A serving is 1 small fresh fruit, such as an apple or orange; ½ of a banana; ½ cup of cooked or canned fruit; ½ cup of fruit juice; 1 cup of melon or raspberries; or 2 tablespoons of dried fruit. ? Milk and no-sugar-added yogurt have 15 grams of carbs in a serving. A serving is 1 cup of milk or 2/3 cup of no-sugar-added yogurt. ? Starchy vegetables have 15 grams of carbs in a serving. A serving is ½ cup of mashed potatoes or sweet potato; 1 cup winter squash; ½ of a small baked potato; ½ cup of cooked beans; or ½ cup cooked corn or green peas.   · Learn how much carbs to eat each day and at each meal. A dietitian or CDE can teach you how to keep track of the amount of carbs you eat. This is called carbohydrate counting. · If you are not sure how to count carbohydrate grams, use the Plate Method to plan meals. It is a good, quick way to make sure that you have a balanced meal. It also helps you spread carbs throughout the day. ? Divide your plate by types of foods. Put non-starchy vegetables on half the plate, meat or other protein food on one-quarter of the plate, and a grain or starchy vegetable in the final quarter of the plate. To this you can add a small piece of fruit and 1 cup of milk or yogurt, depending on how many carbs you are supposed to eat at a meal.  · Try to eat about the same amount of carbs at each meal. Do not \"save up\" your daily allowance of carbs to eat at one meal.  · Proteins have very little or no carbs per serving. Examples of proteins are beef, chicken, turkey, fish, eggs, tofu, cheese, cottage cheese, and peanut butter. A serving size of meat is 3 ounces, which is about the size of a deck of cards. Examples of meat substitute serving sizes (equal to 1 ounce of meat) are 1/4 cup of cottage cheese, 1 egg, 1 tablespoon of peanut butter, and ½ cup of tofu. How can you eat out and still eat healthy? · Learn to estimate the serving sizes of foods that have carbohydrate. If you measure food at home, it will be easier to estimate the amount in a serving of restaurant food. · If the meal you order has too much carbohydrate (such as potatoes, corn, or baked beans), ask to have a low-carbohydrate food instead. Ask for a salad or green vegetables. · If you use insulin, check your blood sugar before and after eating out to help you plan how much to eat in the future. · If you eat more carbohydrate at a meal than you had planned, take a walk or do other exercise. This will help lower your blood sugar. What are some tips for eating healthy? · Limit saturated fat, such as the fat from meat and dairy products.  This is a healthy choice because people who have diabetes are at higher risk of heart disease. So choose lean cuts of meat and nonfat or low-fat dairy products. Use olive or canola oil instead of butter or shortening when cooking. · Don't skip meals. Your blood sugar may drop too low if you skip meals and take insulin or certain medicines for diabetes. · Check with your doctor before you drink alcohol. Alcohol can cause your blood sugar to drop too low. Alcohol can also cause a bad reaction if you take certain diabetes medicines. Follow-up care is a key part of your treatment and safety. Be sure to make and go to all appointments, and call your doctor if you are having problems. It's also a good idea to know your test results and keep a list of the medicines you take. Where can you learn more? Go to http://www.[employer]ier.com/  Enter I147 in the search box to learn more about \"Learning About Carbohydrate (Carb) Counting and Eating Out When You Have Diabetes. \"  Current as of: December 17, 2020               Content Version: 13.0  © 2006-2021 [employer]. Care instructions adapted under license by [employer] (which disclaims liability or warranty for this information). If you have questions about a medical condition or this instruction, always ask your healthcare professional. [address] any warranty or liability for your use of this information.

## 2022-01-27 NOTE — PROGRESS NOTES
Nicole Phillip. is a 74 y.o. male presenting for Follow Up Chronic Condition (6 mo fu)  . 1. Have you been to the ER, urgent care clinic since your last visit? Hospitalized since your last visit? No    2. Have you seen or consulted any other health care providers outside of the [address] [place] [address] since your last visit? Include any pap smears or colon screening. No    Fall Risk Assessment, last 12 mths 1/27/2022   Able to walk? Yes   Fall in past 12 months? 0   Do you feel unsteady? 0   Are you worried about falling 0         Abuse Screening Questionnaire 1/27/2022   Do you ever feel afraid of your partner? N   Are you in a relationship with someone who physically or mentally threatens you? N   Is it safe for you to go home? Y       3 most recent PHQ Screens 1/27/2022   Little interest or pleasure in doing things Not at all   Feeling down, depressed, irritable, or hopeless Not at all   Total Score PHQ 2 0       There are no discontinued medications.

## 2022-01-28 NOTE — PROGRESS NOTES
Please notify patient. Labs stable.  A1c 6.5  No change in current management/meds  Fax labs to nephrologist

## 2022-03-18 PROBLEM — R07.9 CHEST PAIN: Status: ACTIVE | Noted: 2018-05-18

## 2022-03-18 PROBLEM — I10 HYPERTENSION: Status: ACTIVE | Noted: 2017-07-23

## 2022-03-18 PROBLEM — I30.0 ACUTE IDIOPATHIC PERICARDITIS: Status: ACTIVE | Noted: 2018-05-23

## 2022-03-18 PROBLEM — N18.30 CKD (CHRONIC KIDNEY DISEASE) STAGE 3, GFR 30-59 ML/MIN (HCC): Status: ACTIVE | Noted: 2017-07-23

## 2022-03-18 PROBLEM — E11.21 TYPE II DIABETES MELLITUS WITH NEPHROPATHY (HCC): Status: ACTIVE | Noted: 2019-09-18

## 2022-03-19 PROBLEM — N20.0 RECURRENT KIDNEY STONES: Status: ACTIVE | Noted: 2017-07-23

## 2022-03-19 PROBLEM — M10.9 GOUT: Status: ACTIVE | Noted: 2017-07-23

## 2022-03-20 PROBLEM — E78.5 DYSLIPIDEMIA: Status: ACTIVE | Noted: 2017-07-23

## 2022-04-26 RX ORDER — ALLOPURINOL 100 MG/1
TABLET ORAL
Qty: 180 TABLET | Refills: 1 | Status: SHIPPED | OUTPATIENT
Start: 2022-04-26 | End: 2022-10-25 | Stop reason: SDUPTHER

## 2022-04-26 RX ORDER — LISINOPRIL 2.5 MG/1
TABLET ORAL
Qty: 90 TABLET | Refills: 1 | Status: SHIPPED | OUTPATIENT
Start: 2022-04-26 | End: 2022-10-25 | Stop reason: SDUPTHER

## 2022-04-26 RX ORDER — FENOFIBRATE 160 MG/1
TABLET ORAL
Qty: 90 TABLET | Refills: 1 | Status: SHIPPED | OUTPATIENT
Start: 2022-04-26 | End: 2022-10-25 | Stop reason: SDUPTHER

## 2022-04-26 NOTE — TELEPHONE ENCOUNTER
Last Refill: 21  Last Visit: 2022   Next Visit: 22 Dr. Jai Ospina    Requested Prescriptions     Pending Prescriptions Disp Refills    fenofibrate (LOFIBRA) 160 mg tablet 90 Tablet 1    lisinopriL (PRINIVIL, ZESTRIL) 2.5 mg tablet 90 Tablet 1     Si Tablet daily.     allopurinoL (ZYLOPRIM) 100 mg tablet 180 Tablet 1

## 2022-07-27 PROBLEM — Z92.89 HISTORY OF ECHOCARDIOGRAM: Status: ACTIVE | Noted: 2018-05-01

## 2022-07-27 PROBLEM — N18.32 CKD STAGE G3B/A1, GFR 30-44 AND ALBUMIN CREATININE RATIO <30 MG/G (HCC): Status: ACTIVE | Noted: 2017-07-23

## 2022-07-27 PROBLEM — M1A.00X0 IDIOPATHIC CHRONIC GOUT: Status: ACTIVE | Noted: 2017-07-23

## 2022-07-27 PROBLEM — G47.33 OBSTRUCTIVE SLEEP APNEA SYNDROME: Status: ACTIVE | Noted: 2022-07-27

## 2022-07-27 PROBLEM — I70.90 ATHEROSCLEROTIC VASCULAR DISEASE: Status: ACTIVE | Noted: 2018-05-01

## 2022-07-28 NOTE — PROGRESS NOTES
Subjective:     Chief Complaint   Patient presents with    [address] [facility] Rolando Denise. is a 74 y.o. M. He has a past medical history of diabetes mellitus, hypertension, and dyslipidemia. I reviewed the medical record. The patient was seen most recently by his primary care provider in January. He was managing his diabetes with diet alone, and his repeat A1c was noted to be well controlled at 6.5%. He was using lisinopril for his blood pressure without any issues. Fenofibrate had been prescribed for his dyslipidemia, and he was without any symptoms or side effects. He was using allopurinol for his history of gout. Physical examination was generally unremarkable with a borderline BMI of 29.7 kg/m². Weight loss have been advised, and the patient was advised to continue with his usual medication regimen and obtain routine laboratory studies. Today, the patient comes in for a Medicare wellness visit, establishment, and follow-up on his chronic medical concerns. He reports feeling great overall today and has had no significant clinical changes over the past several months. He denies any somatic complaints today. He continues to follow up regularly with his nephrologist for his history of chronic kidney disease. Over the past several months, there have been no other changes to his medication regimen. He continues on [place] for his history of hypertriglyceridemia, and he reports tolerating this well without any muscle aches or GI symptoms. In addition, he has been on allopurinol for some time, and with this has had no recent gout flares. His last uric acid level obtained a few months ago was well below 6.5 mg/dL. He checks his blood pressure readings at home and typically gets blood pressure readings about where they are in clinic today. No chest pain, shortness of breath, or any further cardiopulmonary symptoms.   No lightheadedness or dizziness or any other focal neurological symptoms. He does not take anything for his diabetes. He had previously been on medication for this, but his last A1c was well below 7% so he was advised by his previous PCP that he could avoid using medications for now. He has not had an eye examination or foot examination for his diabetes in some time. He denies any polyuria or polydipsia. Routine Healthcare Maintenance issues are reviewed and discussed with the patient as noted below. Orders to update gaps in healthcare maintenance were placed as noted below in the Assessment and Plan, where applicable. Referrals are made for eye examinations and foot examinations. We discussed getting updates on his pneumococcal, tetanus, and shingles vaccines. COVID booster is also recommended. Medicare Wellness Questions: Patient lives at home and is completely independent with regard to activities and instrumental activities of daily living. Patient does not drink alcohol to excess. Patient denies recent problems with memory, vision, hearing, balance or gait. Ambulates without difficulty. No abuse concerns. Patient denies recent depression or anxiety concerns. Patient is not using safety equipment in the home. Past Medical History:  Past Medical History:   Diagnosis Date    Atherosclerotic vascular disease 05/2018    CXR Finding - Aorta    CKD stage G3b/A1, GFR 30-44 and albumin creatinine ratio <30 mg/g (HCC)     Dyslipidemia     History of echocardiogram 05/2018    Left ventricle: Systolic function was normal. Ejection fraction was estimated in the range of 60 % to 65 %. There were no regional wall motion abnormalities.     History of kidney stones 01/2012    CT Finding    History of pericarditis 05/23/2018    idiopathic    Hypertension     Idiopathic chronic gout     gout    Obstructive sleep apnea syndrome     septum surgery to fix, still an issue    Type II diabetes mellitus with nephropathy ([facility] [address] 09/18/2019       Past Surgical Histor:  Past Surgical History:   Procedure Laterality Date    HX KNEE ARTHROSCOPY      HX OTHER SURGICAL      kidney stones       Allergies:  No Known Allergies    Medications:  Current Outpatient Medications   Medication Sig Dispense Refill    cyanocobalamin (Vitamin B-12) 100 mcg tablet Take 100 mcg by mouth in the morning. fenofibrate (LOFIBRA) 160 mg tablet TAKE 1 TABLET DAILY WITH LUNCH 90 Tablet 1    lisinopriL (PRINIVIL, ZESTRIL) 2.5 mg tablet TAKE 1 TABLET DAILY 90 Tablet 1    allopurinoL (ZYLOPRIM) 100 mg tablet TAKE 2 TABLETS DAILY 180 Tablet 1    aspirin (ASPIRIN) 325 mg tablet Take 1 Tab by mouth nightly. Potassium Citrate (UROCIT-K) TbER tablet Take 15 mEq by mouth daily (with lunch). vitamin E (AQUA GEMS) 400 unit capsule Take 400 Units by mouth daily (with lunch). cholecalciferol (VITAMIN D3) 1,000 unit tablet Take 500 Units by mouth two (2) times a day. ascorbic acid, vitamin C, (VITAMIN C) 500 mg tablet Take 500 mg by mouth daily (with lunch). COLCRYS 0.6 mg tablet Take 1 Tab by mouth two (2) times a day.  (Patient not taking: Reported on 7/29/2022) 60 Tab 3       Social History:  Social History     Socioeconomic History    Marital status:    Tobacco Use    Smoking status: Never    Smokeless tobacco: Never   Vaping Use    Vaping Use: Never used   Substance and Sexual Activity    Alcohol use: No    Drug use: No       Family History:  Family History   Problem Relation Age of Onset    Diabetes Mother     Hypertension Father        Immunizations:  Immunization History   Administered Date(s) Administered    COVID-19, MODERNA BLUE border, Primary or Immunocompromised, (age 18y+), IM, 100 mcg/0.5mL 04/10/2021, 05/18/2021    Influenza High Dose Vaccine PF 12/01/2021        Healthcare Maintenance:  Health Maintenance   Topic Date Due    Pneumococcal 65+ years (1 - PCV) Never done    Eye Exam Retinal or Dilated  Never done    DTaP/Tdap/Td series (1 - Tdap) Never done    Shingrix Vaccine Age 49> (1 of 2) Never done    Foot Exam Q1  09/18/2020    COVID-19 Vaccine (3 - Booster for Moderna series) 10/18/2021    Medicare Yearly Exam  07/27/2022    Flu Vaccine (1) 09/01/2022    Depression Screen  01/27/2023    A1C test (Diabetic or Prediabetic)  01/27/2023    MICROALBUMIN Q1  01/27/2023    Lipid Screen  01/27/2023    Colorectal Cancer Screening Combo  08/06/2023    Hepatitis C Screening  Completed        Review of Systems:  ROS:  Review of Systems   Constitutional: Negative. HENT: Negative. Eyes: Negative. Respiratory: Negative. Cardiovascular: Negative. Gastrointestinal: Negative. Genitourinary: Negative. Musculoskeletal: Negative. Skin: Negative. Neurological: Negative. Endo/Heme/Allergies: Negative. Psychiatric/Behavioral: Negative. ROS otherwise negative      Objective:     Vital Signs:  Visit Vitals  /81 (BP 1 Location: Left upper arm, BP Patient Position: Sitting, BP Cuff Size: Large adult)   Pulse 70   Temp 97.7 °F (36.5 °C) (Oral)   Resp 18   Ht 5' 6\" (1.676 m)   Wt 181 lb 4.8 oz (82.2 kg)   SpO2 97%   BMI 29.26 kg/m²       BMI:  Body mass index is 29.26 kg/m². Physical Examination:  Physical Exam  Vitals reviewed. Constitutional:       Appearance: Normal appearance. HENT:      Head: Normocephalic and atraumatic. Nose: Nose normal.      Mouth/Throat:      Mouth: Mucous membranes are moist.   Eyes:      Extraocular Movements: Extraocular movements intact. Conjunctiva/sclera: Conjunctivae normal.      Pupils: Pupils are equal, round, and reactive to light. Cardiovascular:      Rate and Rhythm: Normal rate and regular rhythm. Pulses: Normal pulses. Heart sounds: Normal heart sounds. No murmur heard. No friction rub. No gallop. Pulmonary:      Effort: Pulmonary effort is normal. No respiratory distress. Breath sounds: Normal breath sounds. No wheezing, rhonchi or rales.    Abdominal:      General: Bowel sounds are normal. There is no distension. Palpations: Abdomen is soft. There is no mass. Tenderness: There is no abdominal tenderness. There is no guarding or rebound. Musculoskeletal:         General: No tenderness, deformity or signs of injury. Normal range of motion. Cervical back: Normal range of motion and neck supple. Right lower leg: No edema. Left lower leg: No edema. Skin:     General: Skin is warm and dry. Findings: No bruising, lesion or rash. Neurological:      General: No focal deficit present. Mental Status: He is alert and oriented to person, place, and time. Mental status is at baseline. Cranial Nerves: No cranial nerve deficit. Sensory: No sensory deficit. Motor: No weakness. Coordination: Coordination normal.      Gait: Gait normal.      Deep Tendon Reflexes: Reflexes normal.   Psychiatric:         Mood and Affect: Mood normal.         Behavior: Behavior normal.        Physical exam otherwise negative    Diagnostic Testing:    Laboratory Studies:  Office Visit on 01/27/2022   Component Date Value Ref Range Status    Hemoglobin A1c 01/27/2022 6.5 (A) 4.0 - 5.6 % Final    Comment: NEW METHOD PLEASE NOTE NEW REFERENCE RANGE  (NOTE)  HbA1C Interpretive Ranges  <5.7              Normal  5.7 - 6.4         Consider Prediabetes  >6.5              Consider Diabetes      Est. average glucose 01/27/2022 140  mg/dL Final    Uric acid 01/27/2022 5.4  3.5 - 7.2 MG/DL Final    TSH 01/27/2022 2.38  0.36 - 3.74 uIU/mL Final    Comment:   Due to TSH heterogeneity, both structurally and degree of glycosylation,  monoclonal antibodies used in the TSH assay may not accurately quantitate TSH. Therefore, this result should be correlated with clinical findings as well as  with other assessments of thyroid function, e.g., free T4, free T3.       Sodium 01/27/2022 138  136 - 145 mmol/L Final    Potassium 01/27/2022 4.7  3.5 - 5.1 mmol/L Final    Chloride 01/27/2022 109 (A) 97 - 108 mmol/L Final    CO2 01/27/2022 26  21 - 32 mmol/L Final    Anion gap 01/27/2022 3 (A) 5 - 15 mmol/L Final    Glucose 01/27/2022 126 (A) 65 - 100 mg/dL Final    BUN 01/27/2022 38 (A) 6 - 20 MG/DL Final    Creatinine 01/27/2022 2.12 (A) 0.70 - 1.30 MG/DL Final    BUN/Creatinine ratio 01/27/2022 18  12 - 20   Final    GFR est AA 01/27/2022 37 (A) >60 ml/min/1.73m2 Final    GFR est non-AA 01/27/2022 31 (A) >60 ml/min/1.73m2 Final    Comment: Estimated GFR is calculated using the IDMS-traceable Modification of Diet in  Renal Disease (MDRD) Study equation, reported for both  Americans  (GFRAA) and non- Americans (GFRNA), and normalized to 1.73m2 body  surface area. The physician must decide which value applies to the patient. Calcium 01/27/2022 9.5  8.5 - 10.1 MG/DL Final    Bilirubin, total 01/27/2022 0.6  0.2 - 1.0 MG/DL Final    ALT (SGPT) 01/27/2022 31  12 - 78 U/L Final    AST (SGOT) 01/27/2022 19  15 - 37 U/L Final    Alk. phosphatase 01/27/2022 54  45 - 117 U/L Final    Protein, total 01/27/2022 7.5  6.4 - 8.2 g/dL Final    Albumin 01/27/2022 4.1  3.5 - 5.0 g/dL Final    Globulin 01/27/2022 3.4  2.0 - 4.0 g/dL Final    A-G Ratio 01/27/2022 1.2  1.1 - 2.2   Final    Cholesterol, total 01/27/2022 178  <200 MG/DL Final    Triglyceride 01/27/2022 209 (A) <150 MG/DL Final    Comment: Based on NCEP-ATP III:  Triglycerides <150 mg/dL  is considered normal, 150-199  mg/dL  borderline high,  200-499 mg/dL high and  greater than or equal to 500  mg/dL very high. HDL Cholesterol 01/27/2022 36  MG/DL Final    Comment: Based on NCEP ATP III, HDL Cholesterol <40 mg/dL is considered low and >60  mg/dL is elevated.       LDL, calculated 01/27/2022 100.2 (A) 0 - 100 MG/DL Final    Comment: Based on the NCEP-ATP: LDL-C concentrations are considered  optimal <100 mg/dL,  near optimal/above Normal 100-129 mg/dL Borderline High: 130-159, High: 160-189  mg/dL Very High: Greater than or equal to 190 mg/dL      VLDL, calculated 01/27/2022 41.8  MG/DL Final    CHOL/HDL Ratio 01/27/2022 4.9  0.0 - 5.0   Final    WBC 01/27/2022 4.7  4.1 - 11.1 K/uL Final    RBC 01/27/2022 4.25  4. 10 - 5.70 M/uL Final    HGB 01/27/2022 14.2  12.1 - 17.0 g/dL Final    HCT 01/27/2022 44.5  36.6 - 50.3 % Final    MCV 01/27/2022 104.7 (A) 80.0 - 99.0 FL Final    MCH 01/27/2022 33.4  26.0 - 34.0 PG Final    MCHC 01/27/2022 31.9  30.0 - 36.5 g/dL Final    RDW 01/27/2022 13.9  11.5 - 14.5 % Final    PLATELET 00/45/6527 515  150 - 400 K/uL Final    MPV 01/27/2022 12.5  8.9 - 12.9 FL Final    NRBC 01/27/2022 0.0  0  WBC Final    ABSOLUTE NRBC 01/27/2022 0.00  0.00 - 0.01 K/uL Final    NEUTROPHILS 01/27/2022 43  32 - 75 % Final    LYMPHOCYTES 01/27/2022 35  12 - 49 % Final    MONOCYTES 01/27/2022 11  5 - 13 % Final    EOSINOPHILS 01/27/2022 10 (A) 0 - 7 % Final    BASOPHILS 01/27/2022 1  0 - 1 % Final    IMMATURE GRANULOCYTES 01/27/2022 0  0.0 - 0.5 % Final    ABS. NEUTROPHILS 01/27/2022 2.0  1.8 - 8.0 K/UL Final    ABS. LYMPHOCYTES 01/27/2022 1.7  0.8 - 3.5 K/UL Final    ABS. MONOCYTES 01/27/2022 0.5  0.0 - 1.0 K/UL Final    ABS. EOSINOPHILS 01/27/2022 0.5 (A) 0.0 - 0.4 K/UL Final    ABS. BASOPHILS 01/27/2022 0.1  0.0 - 0.1 K/UL Final    ABS. IMM. GRANS. 01/27/2022 0.0  0.00 - 0.04 K/UL Final    DF 01/27/2022 AUTOMATED    Final    Microalbumin,urine random 01/27/2022 <0.50  MG/DL Final    No reference range has been established. Creatinine, urine 01/27/2022 90.60  mg/dL Final    No reference range has been established.     Microalbumin/Creat ratio (mg/g cre* 01/27/2022 <6  0 - 30 mg/g Final    Color 01/27/2022 YELLOW/STRAW    Final    Color Reference Range: Straw, Yellow or Dark Yellow    Appearance 01/27/2022 CLEAR  CLEAR   Final    Specific gravity 01/27/2022 1.015  1.003 - 1.030   Final    pH (UA) 01/27/2022 5.0  5.0 - 8.0   Final    Protein 01/27/2022 Negative  Negative mg/dL Final    Glucose 01/27/2022 Negative  Negative mg/dL Final    Ketone 01/27/2022 Negative  Negative mg/dL Final    Bilirubin 01/27/2022 Negative  Negative   Final    Blood 01/27/2022 Negative  Negative   Final    Urobilinogen 01/27/2022 0.2  0.2 - 1.0 EU/dL Final    Nitrites 01/27/2022 Negative  Negative   Final    Leukocyte Esterase 01/27/2022 Negative  Negative   Final    UA:UC IF INDICATED 01/27/2022 CULTURE NOT INDICATED BY UA RESULT  CULTURE NOT INDICATED BY UA RESULT   Final    WBC 01/27/2022 0-4  0 - 4 /hpf Final    RBC 01/27/2022 0-5  0 - 5 /hpf Final    Epithelial cells 01/27/2022 FEW  FEW /lpf Final    Comment: Epithelial cell category consists of squamous cells and /or transitional  urothelial cells. Renal tubular cells, if present, are separately identified as  such. Bacteria 01/27/2022 Negative  Negative /hpf Final    Hyaline cast 01/27/2022 0-2  0 - 5 /lpf Final         Radiographic Studies:  XR Results (most recent):  Results from [place] encounter on 05/18/18    XR CHEST PA LAT    Narrative  EXAM:  XR CHEST PA LAT. INDICATION: Chest pain. COMPARISON: 5/12/2010. FINDINGS:  PA and lateral radiographs of the chest were obtained. Lungs: The lungs are clear of mass, nodule, airspace disease or edema. Pleura: There is no pleural effusion or pneumothorax. Mediastinum: The cardiac and mediastinal contours and pulmonary vascularity are  normal.  The aorta is atherosclerotic. Bones and soft tissues: There are mild degenerative changes of the spine. Impression  IMPRESSION: No acute abnormality and no change. IRENE Results (most recent):  No results found for this or any previous visit. CT Results (most recent):  Results from Hospital Encounter encounter on 01/31/12    CT ABDOMEN PELVIS WITHOUT CONTRAST    Narrative  **Final Report**      ICD Codes / Adm. Diagnosis: 903318   / Flank Pain  Examination:  CT ABD PELVIS WO CON  - [id number] - Jan 31 2012  5:08AM  Accession No:  [id number]  Reason:  suspect left ureteral stone      REPORT:  INDICATION:   suspect left ureteral stone    COMPARISON: 2007. TECHNIQUE:  Unenhanced multislice helical CT was performed from the diaphragm to the  symphysis pubis without oral or intravenous contrast administration. Contiguous 5 mm axial images were reconstructed and lung and soft tissue  windows were generated. Coronal reformations were generated. FINDINGS:  The visualized portions of the lung bases are clear. The absence of intravenous contrast material reduces the sensitivity for  evaluation of the solid parenchymal organs of the abdomen. No focal  abnormalities are seen within the liver, spleen, pancreas or adrenal glands  or kidneys. There are 2 nonobstructing right lower pole calculus individually measures 3  mm. There is a nonobstructing left lower renal pole calculus measures 4 mm. There is left hydronephrosis and hydroureter to a proximal ureteral 4.4 mm  calculus (coronal image 44). A moderate inflammatory change surrounds the  left kidney. The aorta tapers without aneurysm. There is no retroperitoneal adenopathy  or mass. The bowel is not obstructed. The appendix is not seen. There is no ascites  or free intraperitoneal air. The prostate is normal.   There is no pelvic mass or adenopathy. IMPRESSION:  Normal abdomen. Nonobstructing bilateral renal calculi  Left proximal ureteral obstructing calculus. Associated inflammatory changes  left kidney. This may be secondary to obstruction. Please clinically exclude  superinfection. Signing/Reading Doctor: Sathya Davis ([id number])  Approved: Sathya Davis ([id number])  01/31/2012     DEXA Results (most recent):  No results found for this or any previous visit. MRI Results (most recent):  No results found for this or any previous visit. Assessment/Plan:       ICD-10-CM ICD-9-CM    1. Routine adult health maintenance  Z00.00 V70.0 CBC WITH AUTOMATED DIFF      2.  Type II diabetes mellitus with nephropathy ([facility] [address]  E11.21 250.40 REFERRAL TO OPHTHALMOLOGY     583.81 REFERRAL TO PODIATRY      HEMOGLOBIN A1C WITH EAG      3. Stage 3b chronic kidney disease (HCC)  A55.70 838.1 METABOLIC PANEL, COMPREHENSIVE      MICROALBUMIN, UR, RAND W/ MICROALB/CREAT RATIO      4. Primary hypertension  I10 401.9       5. Atherosclerotic vascular disease  I70.90 440.9       6. Dyslipidemia  E78.5 272.4 LIPID PANEL      7. Encounter for immunization  Z23 V03.89                  Healthcare Maintenance:  - Preventive measures are reviewed as per above  - Up to date on routine interventions except as noted above  - Orders placed to update gaps as noted  - Notes: Eye examination and foot examination referrals placed as noted above. Discussed vaccines. Fasting labs ordered. ASCVD:   - Current Symptoms: No Symptoms, no angina   - History and Contributing Factors: diabetes, elevated cholesterol, and hypertension  Key CAD CHF Meds               fenofibrate (LOFIBRA) 160 mg tablet (Taking) TAKE 1 TABLET DAILY WITH LUNCH    lisinopriL (PRINIVIL, ZESTRIL) 2.5 mg tablet (Taking) TAKE 1 TABLET DAILY    aspirin (ASPIRIN) 325 mg tablet (Taking) Take 1 Tab by mouth nightly. - Target BP: < 130/80 mmHg; see Blood Pressure section   - Statin? NO, on [place] currently   - Antiplatelet and/or Anticoagulant? YES   - ACEI/ARB? YES   - Beta Blocker? NO   - Notes: Probably would benefit from addition of statin therapy. His last LDL was still above target less than 100 mg/dL, see below. We discussed potentially initiating a statin today in addition to his [place], versus replacing the [place] with atorvastatin. He is precontemplative regarding this but says that he would consider this if his LDL is not at target on repeat check.     Chronic Kidney Disease:   - Last Serum Creatinine:   Lab Results   Component Value Date/Time    Creatinine (POC) 2.3 (A) 01/31/2018 08:34 AM    Creatinine 2.12 (H) 01/27/2022 09:39 AM       - Last GFR:   Lab Results   Component Value Date/Time    GFR est AA 37 (L) 01/27/2022 09:39 AM    GFR est non-AA 31 (L) 01/27/2022 09:39 AM      - Current Stage by eGFR: G3B   - Proteinuria: A1   -   Key CKD Meds               lisinopriL (PRINIVIL, ZESTRIL) 2.5 mg tablet (Taking) TAKE 1 TABLET DAILY    Potassium Citrate (UROCIT-K) TbER tablet (Taking) Take 15 mEq by mouth daily (with lunch). cholecalciferol (VITAMIN D3) 1,000 unit tablet (Taking) Take 500 Units by mouth two (2) times a day. - Blood Pressure Target: See Hypertension/Blood Pressure Management Section   - Advised avoidance of NSAIDs and other nephrotoxins wherever possible   - Advised renal dosing of medications where necessary   - Acid/Base Management: ---   - Phosphorus Management: ---   - Nephrology Consultation: Follows with Dr. Joce Fonseca   - Notes: Currently on Fenofibrate - borderline SCR to continue. Continuing with current therapy for now, consider discontinuing Lofibra in favor of atorvastatin, particularly if serum creatinine has increased. Diabetes Mellitus:   - Type: Type 2 Diabetes   - Current A1C:   Lab Results   Component Value Date/Time    Hemoglobin A1c 6.5 (H) 01/27/2022 09:39 AM    Hemoglobin A1c (POC) 6.4 (A) 01/31/2018 08:34 AM    Hemoglobin A1c, External 6.1 01/18/2017 12:00 AM       - Target A1C: Less than 7%   - Complications: nephropathy   - Relevant Diabetes Medications:  Key Antihyperglycemic Medications       Patient is on no antihyperglycemic meds. - General Recommendations discussed today: diabetic diet discussed in detail, written exchange diet given, foot care discussed and Podiatry visits discussed, annual eye examinations at Ophthalmology discussed, and long term diabetic complications discussed   - Notes: Referrals placed for eye examination and foot examination. No medications currently. Reasonable to continue with diet control alone for now pending repeat A1c.   If necessary for control, consider Trulicity. Hyperlipidemia/Dyslipidemia:   - Summary of Cardiovascular Risks and Goals:     LDL goal is under 100  diabetic  hypertension  hyperlipidemia   -   Lab Results   Component Value Date/Time    LDL, calculated 100.2 (H) 01/27/2022 09:39 AM    HDL Cholesterol 36 01/27/2022 09:39 AM       - Relevant Cholesterol Meds:  Key Antihyperlipidemia Meds               fenofibrate (LOFIBRA) 160 mg tablet (Taking) TAKE 1 TABLET DAILY WITH LUNCH              - Cholesterol at target: yes, though could be improved   - Does patient meet USPSTF and ACC/AHA indications for pharmacotherapy (e.g., statin): yes   - GI symptoms with meds: NO   - Muscle aches with meds: NO   - Other Adverse effects with meds: NO   - Medication Plan: continue   - Notes: Given CKD, should consider d/c fenofibrate in favor of atorvastatin. Continuing with current care for now pending repeat laboratory assessment. Patient amenable to beginning statin therapy depending on labs. Essential Hypertension/Blood Pressure Management:   - Home BP Readings: usual 120/80   - Current Control: optimal   - Target BP: Less than 130/80 mmHg   - Relevant BP Meds:  Key CAD CHF Meds               fenofibrate (LOFIBRA) 160 mg tablet (Taking) TAKE 1 TABLET DAILY WITH LUNCH    lisinopriL (PRINIVIL, ZESTRIL) 2.5 mg tablet (Taking) TAKE 1 TABLET DAILY    aspirin (ASPIRIN) 325 mg tablet (Taking) Take 1 Tab by mouth nightly. - Plan: continue current treatment regimen, continue current meds, dietary sodium restriction, regular aerobic exercise, weight loss   - Notes: Rechecking labs as noted above            I have reviewed the patient's medical history in detail and updated the computerized patient record. We had a prolonged discussion about these complex clinical issues and went over the various important aspects to consider. All questions were answered.       Advised the patient to call back or return to office if symptoms do not improve, change in nature, or persist.     The patient was given an after visit summary or informed of [employer] Access which includes patient instructions, diagnoses, current medications, & vitals. he expressed understanding with the diagnosis and plan. Demar Benavides MD    Please note that this dictation was completed with [employer], the computer voice recognition software. Quite often unanticipated grammatical, syntax, homophones, and other interpretive errors are inadvertently transcribed by the computer software. Please disregard these errors. Please excuse any errors that have escaped final proofreading. This is the Subsequent Medicare Annual Wellness Exam, performed 12 months or more after the Initial AWV or the last Subsequent AWV    I have reviewed the patient's medical history in detail and updated the computerized patient record. Assessment/Plan   Education and counseling provided:  Are appropriate based on today's review and evaluation    1. Routine adult health maintenance  -     CBC WITH AUTOMATED DIFF; Future  2. Type II diabetes mellitus with nephropathy (HCC)  -     REFERRAL TO OPHTHALMOLOGY; Future  -     REFERRAL TO PODIATRY; Future  -     HEMOGLOBIN A1C WITH EAG; Future  3. Stage 3b chronic kidney disease (HCC)  -     METABOLIC PANEL, COMPREHENSIVE; Future  -     MICROALBUMIN, UR, RAND W/ MICROALB/CREAT RATIO; Future  4. Primary hypertension  5. Atherosclerotic vascular disease  6. Dyslipidemia  -     LIPID PANEL; Future  7.  Encounter for immunization       Depression Risk Factor Screening     3 most recent PHQ Screens 7/29/2022   Little interest or pleasure in doing things Not at all   Feeling down, depressed, irritable, or hopeless Not at all   Total Score PHQ 2 0       Alcohol & Drug Abuse Risk Screen    Do you average more than 1 drink per night or more than 7 drinks a week: No    In the past three months have you have had more than 4 drinks containing alcohol on one occasion: No Functional Ability and Level of Safety    Hearing: Hearing is good. Activities of Daily Living: The home contains: no safety equipment. Patient does total self care      Ambulation: with no difficulty     Fall Risk:  Fall Risk Assessment, last 12 mths 1/27/2022   Able to walk? Yes   Fall in past 12 months? 0   Do you feel unsteady? 0   Are you worried about falling 0      Abuse Screen:  Patient is not abused       Cognitive Screening    Has your family/caregiver stated any concerns about your memory: no     Cognitive Screening: Normal - Verbal Fluency Test    Health Maintenance Due     Health Maintenance Due   Topic Date Due    Pneumococcal 65+ years (1 - PCV) Never done    Eye Exam Retinal or Dilated  Never done    DTaP/Tdap/Td series (1 - Tdap) Never done    Shingrix Vaccine Age 50> (1 of 2) Never done    Foot Exam Q1  09/18/2020    COVID-19 Vaccine (3 - Booster for Moderna series) 10/18/2021    Medicare Yearly Exam  07/27/2022       Patient Care Team   Patient Care Team:  Akua Mosley MD as PCP - General (Internal Medicine Physician)  Pats[facility]  Flaquita Campuzano MD as Physician (Cardiovascular Disease Physician)  Anshul Covarrubias MD (Nephrology)  Chuck Gongora MD (Urology)    History     Patient Active Problem List   Diagnosis Code    Hypertension I10    CKD stage G3b/A1, GFR 30-44 and albumin creatinine ratio <30 mg/g (HCC) N18.32    Dyslipidemia E78.5    Idiopathic chronic gout M1A. 00X0    Recurrent kidney stones N20.0    Chest pain R07.9    Acute idiopathic pericarditis I30.0    Type II diabetes mellitus with nephropathy ([facility] [address] E11.21    Atherosclerotic vascular disease I70.90    History of echocardiogram Z92.89    History of kidney stones Z87.442    Obstructive sleep apnea syndrome G47.33     Past Medical History:   Diagnosis Date    Atherosclerotic vascular disease 05/2018    CXR Finding - Aorta    CKD stage G3b/A1, GFR 30-44 and albumin creatinine ratio <30 mg/g (HCC)     Dyslipidemia     History of echocardiogram 05/2018    Left ventricle: Systolic function was normal. Ejection fraction was estimated in the range of 60 % to 65 %. There were no regional wall motion abnormalities. History of kidney stones 01/2012    CT Finding    History of pericarditis 05/23/2018    idiopathic    Hypertension     Idiopathic chronic gout     gout    Obstructive sleep apnea syndrome     septum surgery to fix, still an issue    Type II diabetes mellitus with nephropathy ([facility] [address] 09/18/2019      Past Surgical History:   Procedure Laterality Date    HX KNEE ARTHROSCOPY      HX OTHER SURGICAL      kidney stones     Current Outpatient Medications   Medication Sig Dispense Refill    cyanocobalamin (Vitamin B-12) 100 mcg tablet Take 100 mcg by mouth in the morning. fenofibrate (LOFIBRA) 160 mg tablet TAKE 1 TABLET DAILY WITH LUNCH 90 Tablet 1    lisinopriL (PRINIVIL, ZESTRIL) 2.5 mg tablet TAKE 1 TABLET DAILY 90 Tablet 1    allopurinoL (ZYLOPRIM) 100 mg tablet TAKE 2 TABLETS DAILY 180 Tablet 1    aspirin (ASPIRIN) 325 mg tablet Take 1 Tab by mouth nightly. Potassium Citrate (UROCIT-K) TbER tablet Take 15 mEq by mouth daily (with lunch). vitamin E (AQUA GEMS) 400 unit capsule Take 400 Units by mouth daily (with lunch). cholecalciferol (VITAMIN D3) 1,000 unit tablet Take 500 Units by mouth two (2) times a day. ascorbic acid, vitamin C, (VITAMIN C) 500 mg tablet Take 500 mg by mouth daily (with lunch).        No Known Allergies    Family History   Problem Relation Age of Onset    Diabetes Mother     Hypertension Father      Social History     Tobacco Use    Smoking status: Never    Smokeless tobacco: Never   Substance Use Topics    Alcohol use: No         Juancho Medina MD

## 2022-07-29 ENCOUNTER — OFFICE VISIT (OUTPATIENT)
Dept: INTERNAL MEDICINE CLINIC | Age: 72
End: 2022-07-29
Payer: MEDICARE

## 2022-07-29 VITALS
RESPIRATION RATE: 18 BRPM | WEIGHT: 181.3 LBS | HEART RATE: 70 BPM | DIASTOLIC BLOOD PRESSURE: 81 MMHG | BODY MASS INDEX: 29.14 KG/M2 | TEMPERATURE: 97.7 F | HEIGHT: 66 IN | SYSTOLIC BLOOD PRESSURE: 127 MMHG | OXYGEN SATURATION: 97 %

## 2022-07-29 DIAGNOSIS — Z00.00 MEDICARE ANNUAL WELLNESS VISIT, SUBSEQUENT: ICD-10-CM

## 2022-07-29 DIAGNOSIS — E11.21 TYPE II DIABETES MELLITUS WITH NEPHROPATHY (HCC): ICD-10-CM

## 2022-07-29 DIAGNOSIS — Z00.00 ROUTINE ADULT HEALTH MAINTENANCE: Primary | ICD-10-CM

## 2022-07-29 DIAGNOSIS — I10 PRIMARY HYPERTENSION: ICD-10-CM

## 2022-07-29 DIAGNOSIS — N18.32 STAGE 3B CHRONIC KIDNEY DISEASE (HCC): ICD-10-CM

## 2022-07-29 DIAGNOSIS — E78.5 DYSLIPIDEMIA: ICD-10-CM

## 2022-07-29 DIAGNOSIS — I70.90 ATHEROSCLEROTIC VASCULAR DISEASE: ICD-10-CM

## 2022-07-29 PROCEDURE — 1123F ACP DISCUSS/DSCN MKR DOCD: CPT | Performed by: INTERNAL MEDICINE

## 2022-07-29 PROCEDURE — 99214 OFFICE O/P EST MOD 30 MIN: CPT | Performed by: INTERNAL MEDICINE

## 2022-07-29 PROCEDURE — G0439 PPPS, SUBSEQ VISIT: HCPCS | Performed by: INTERNAL MEDICINE

## 2022-07-29 PROCEDURE — 3044F HG A1C LEVEL LT 7.0%: CPT | Performed by: INTERNAL MEDICINE

## 2022-07-29 RX ORDER — UREA 10 %
100 LOTION (ML) TOPICAL DAILY
COMMUNITY

## 2022-07-29 NOTE — PROGRESS NOTES
Chief Complaint   Patient presents with    Establish Care       1. \"Have you been to the ER, urgent care clinic since your last visit? Hospitalized since your last visit? \" No    2. \"Have you seen or consulted any other health care providers outside of the [address] [place] [address] since your last visit? \" No     3. For patients aged 39-70: Has the patient had a colonoscopy / FIT/ Cologuard? No      If the patient is female:    4. For patients aged 41-77: Has the patient had a mammogram within the past 2 years? No      5. For patients aged 21-65: Has the patient had a pap smear?  No

## 2022-07-29 NOTE — PATIENT INSTRUCTIONS
Please continue monitoring your blood pressure at home using your home blood pressure monitor. Please record your readings and bring these with you to your next visit. Additional information on atorvastatin is provided below. This medication is not prescribed currently but we may need to start this depending on your repeat cholesterol levels. Your target LDL is < 100 mg/dL. Atorvastatin (By mouth)   Atorvastatin (a-tor-va-STAT-in)  Treats high cholesterol and triglyceride levels. Reduces the risk of angina, stroke, heart attack, or certain heart and blood vessel problems. This medicine is a statin. Brand Name(s): Lipitor   There may be other brand names for this medicine. When This Medicine Should Not Be Used: This medicine is not right for everyone. Do not use it if you had an allergic reaction to atorvastatin, if you have active liver disease, or if you are pregnant or breastfeeding. How to Use This Medicine:   Tablet  Take your medicine as directed. Your dose may need to be changed several times to find what works best for you. Take this medicine at the same time each day. Swallow the tablet whole. Do not break it. Missed dose: Take a dose as soon as you remember. If it is less than 12 hours until your next dose, skip the missed dose and take the next dose at the regular time. Do not take 2 doses of this medicine within 12 hours. Read and follow the patient instructions that come with this medicine. Talk to your doctor or pharmacist if you have any questions. Store the medicine in a closed container at room temperature, away from heat, moisture, and direct light. Drugs and Foods to Avoid:   Ask your doctor or pharmacist before using any other medicine, including over-the-counter medicines, vitamins, and herbal products. Some medicines can affect how atorvastatin works.  Tell your doctor if you also use birth control pills, boceprevir, cimetidine, colchicine, cyclosporine, digoxin, niacin, rifampin, spironolactone, telaprevir, medicine to treat an infection, or medicine to treat HIV/AIDS. Warnings While Using This Medicine: It is not safe to take this medicine during pregnancy. It could harm an unborn baby. Tell your doctor right away if you become pregnant. Tell your doctor if you have kidney disease, diabetes, muscle pain or weakness, thyroid problems, have recently had a stroke or TIA (transient ischemic attack), or have a history of liver disease. Tell your doctor if you drink grapefruit juice or drink alcohol regularly. This medicine can cause muscle problems, which can lead to kidney problems. Tell any doctor or dentist who treats you that you use this medicine. You may need to stop using it if you have surgery, have an injury, or develop serious health problems. Your doctor will do lab tests at regular visits to check on the effects of this medicine. Keep all appointments. Keep all medicine out of the reach of children. Never share your medicine with anyone. Possible Side Effects While Using This Medicine:   Call your doctor right away if you notice any of these side effects: Allergic reaction: Itching or hives, swelling in your face or hands, swelling or tingling in your mouth or throat, chest tightness, trouble breathing  Blistering, peeling, red skin rash  Change in how much or how often you urinate  Dark urine or pale stools, nausea, vomiting, loss of appetite, stomach pain, yellow skin or eyes  Fever  Muscle pain, tenderness, or weakness  Unusual tiredness  If you notice these less serious side effects, talk with your doctor:   Diarrhea  Joint pain  If you notice other side effects that you think are caused by this medicine, tell your doctor. Call your doctor for medical advice about side effects.  You may report side effects to FDA at 6-613-FDA-4961  © 2017 [facility] Information is for End User's use only and may not be sold, redistributed or otherwise used for commercial purposes. The above information is an  only. It is not intended as medical advice for individual conditions or treatments. Talk to your doctor, nurse or pharmacist before following any medical regimen to see if it is safe and effective for you. Medicare Wellness Visit, Male    The best way to live healthy is to have a lifestyle where you eat a well-balanced diet, exercise regularly, limit alcohol use, and quit all forms of tobacco/nicotine, if applicable. Regular preventive services are another way to keep healthy. Preventive services (vaccines, screening tests, monitoring & exams) can help personalize your care plan, which helps you manage your own care. Screening tests can find health problems at the earliest stages, when they are easiest to treat. [facility] follows the current, evidence-based guidelines published by the [facility] ([facility]) when recommending preventive services for our patients. Because we follow these guidelines, sometimes recommendations change over time as research supports it. (For example, a prostate screening blood test is no longer routinely recommended for men with no symptoms). Of course, you and your doctor may decide to screen more often for some diseases, based on your risk and co-morbidities (chronic disease you are already diagnosed with). Preventive services for you include:  - Medicare offers their members a free annual wellness visit, which is time for you and your primary care provider to discuss and plan for your preventive service needs. Take advantage of this benefit every year!  -All adults over age 72 should receive the recommended pneumonia vaccines.  Current USPSTF guidelines recommend a series of two vaccines for the best pneumonia protection.   -All adults should have a flu vaccine yearly and tetanus vaccine every 10 years.  -All adults age 48 and older should receive the shingles vaccines (series of two vaccines). -All adults age 38-68 who are overweight should have a diabetes screening test once every three years.   -Other screening tests & preventive services for persons with diabetes include: an eye exam to screen for diabetic retinopathy, a kidney function test, a foot exam, and stricter control over your cholesterol.   -Cardiovascular screening for adults with routine risk involves an electrocardiogram (ECG) at intervals determined by the provider.   -Colorectal cancer screening should be done for adults age 54-65 with no increased risk factors for colorectal cancer. There are a number of acceptable methods of screening for this type of cancer. Each test has its own benefits and drawbacks. Discuss with your provider what is most appropriate for you during your annual wellness visit. The different tests include: colonoscopy (considered the best screening method), a fecal occult blood test, a fecal DNA test, and sigmoidoscopy.  -All adults born between [facility] should be screened once for Hepatitis C.  -An Abdominal Aortic Aneurysm (AAA) Screening is recommended for men age 73-68 who has ever smoked in their lifetime.      Here is a list of your current Health Maintenance items (your personalized list of preventive services) with a due date:  Health Maintenance Due   Topic Date Due    Pneumococcal Vaccine (1 - PCV) Never done    Eye Exam  Never done    DTaP/Tdap/Td  (1 - Tdap) Never done    Shingles Vaccine (1 of 2) Never done    Diabetic Foot Care  09/18/2020    COVID-19 Vaccine (3 - Booster for Guy Giordano series) 10/18/2021

## 2022-07-30 LAB
ALBUMIN SERPL-MCNC: 4.1 G/DL (ref 3.5–5)
ALBUMIN/GLOB SERPL: 1.2 {RATIO} (ref 1.1–2.2)
ALP SERPL-CCNC: 48 U/L (ref 45–117)
ALT SERPL-CCNC: 42 U/L (ref 12–78)
ANION GAP SERPL CALC-SCNC: 8 MMOL/L (ref 5–15)
AST SERPL-CCNC: 25 U/L (ref 15–37)
BASOPHILS # BLD: 0.1 K/UL (ref 0–0.1)
BASOPHILS NFR BLD: 1 % (ref 0–1)
BILIRUB SERPL-MCNC: 0.8 MG/DL (ref 0.2–1)
BUN SERPL-MCNC: 46 MG/DL (ref 6–20)
BUN/CREAT SERPL: 20 (ref 12–20)
CALCIUM SERPL-MCNC: 9.7 MG/DL (ref 8.5–10.1)
CHLORIDE SERPL-SCNC: 107 MMOL/L (ref 97–108)
CHOLEST SERPL-MCNC: 187 MG/DL
CO2 SERPL-SCNC: 25 MMOL/L (ref 21–32)
CREAT SERPL-MCNC: 2.27 MG/DL (ref 0.7–1.3)
CREAT UR-MCNC: 123 MG/DL
DIFFERENTIAL METHOD BLD: ABNORMAL
EOSINOPHIL # BLD: 0.6 K/UL (ref 0–0.4)
EOSINOPHIL NFR BLD: 11 % (ref 0–7)
ERYTHROCYTE [DISTWIDTH] IN BLOOD BY AUTOMATED COUNT: 14.3 % (ref 11.5–14.5)
EST. AVERAGE GLUCOSE BLD GHB EST-MCNC: 189 MG/DL
GLOBULIN SER CALC-MCNC: 3.3 G/DL (ref 2–4)
GLUCOSE SERPL-MCNC: 95 MG/DL (ref 65–100)
HBA1C MFR BLD: 8.2 % (ref 4–5.6)
HCT VFR BLD AUTO: 43.2 % (ref 36.6–50.3)
HDLC SERPL-MCNC: 32 MG/DL
HDLC SERPL: 5.8 {RATIO} (ref 0–5)
HGB BLD-MCNC: 14.1 G/DL (ref 12.1–17)
IMM GRANULOCYTES # BLD AUTO: 0 K/UL (ref 0–0.04)
IMM GRANULOCYTES NFR BLD AUTO: 0 % (ref 0–0.5)
LDLC SERPL CALC-MCNC: 111.8 MG/DL (ref 0–100)
LYMPHOCYTES # BLD: 1.9 K/UL (ref 0.8–3.5)
LYMPHOCYTES NFR BLD: 39 % (ref 12–49)
MCH RBC QN AUTO: 34.4 PG (ref 26–34)
MCHC RBC AUTO-ENTMCNC: 32.6 G/DL (ref 30–36.5)
MCV RBC AUTO: 105.4 FL (ref 80–99)
MICROALBUMIN UR-MCNC: 0.69 MG/DL
MICROALBUMIN/CREAT UR-RTO: 6 MG/G (ref 0–30)
MONOCYTES # BLD: 0.5 K/UL (ref 0–1)
MONOCYTES NFR BLD: 11 % (ref 5–13)
NEUTS SEG # BLD: 1.9 K/UL (ref 1.8–8)
NEUTS SEG NFR BLD: 38 % (ref 32–75)
NRBC # BLD: 0 K/UL (ref 0–0.01)
NRBC BLD-RTO: 0 PER 100 WBC
PLATELET # BLD AUTO: 186 K/UL (ref 150–400)
PMV BLD AUTO: 12 FL (ref 8.9–12.9)
POTASSIUM SERPL-SCNC: 4.9 MMOL/L (ref 3.5–5.1)
PROT SERPL-MCNC: 7.4 G/DL (ref 6.4–8.2)
RBC # BLD AUTO: 4.1 M/UL (ref 4.1–5.7)
SODIUM SERPL-SCNC: 140 MMOL/L (ref 136–145)
TRIGL SERPL-MCNC: 216 MG/DL (ref ?–150)
VLDLC SERPL CALC-MCNC: 43.2 MG/DL
WBC # BLD AUTO: 5 K/UL (ref 4.1–11.1)

## 2022-07-31 ENCOUNTER — TELEPHONE (OUTPATIENT)
Dept: INTERNAL MEDICINE CLINIC | Age: 72
End: 2022-07-31

## 2022-07-31 RX ORDER — DULAGLUTIDE 1.5 MG/.5ML
1.5 INJECTION, SOLUTION SUBCUTANEOUS
Qty: 2 ML | Refills: 11 | Status: SHIPPED | OUTPATIENT
Start: 2022-08-28 | End: 2023-08-23

## 2022-07-31 RX ORDER — DULAGLUTIDE 0.75 MG/.5ML
0.75 INJECTION, SOLUTION SUBCUTANEOUS
Qty: 2 ML | Refills: 0 | Status: SHIPPED | OUTPATIENT
Start: 2022-07-31 | End: 2022-08-28

## 2022-07-31 NOTE — PROGRESS NOTES
Notify patient. Stable chronic kidney disease. Elevated A1c. This requires treatment. Unable to use Metformin given renal function. Beginning Trulicity.

## 2022-08-05 NOTE — PROGRESS NOTES
Patient was informed about results.  Patient wants to try diet and exercise first before trying medication

## 2022-10-25 RX ORDER — LISINOPRIL 2.5 MG/1
TABLET ORAL
Qty: 90 TABLET | Refills: 3 | Status: SHIPPED | OUTPATIENT
Start: 2022-10-25

## 2022-10-25 RX ORDER — FENOFIBRATE 160 MG/1
TABLET ORAL
Qty: 90 TABLET | Refills: 3 | Status: SHIPPED | OUTPATIENT
Start: 2022-10-25

## 2022-10-25 RX ORDER — ALLOPURINOL 100 MG/1
TABLET ORAL
Qty: 180 TABLET | Refills: 3 | Status: SHIPPED | OUTPATIENT
Start: 2022-10-25

## 2022-10-25 RX ORDER — LISINOPRIL 2.5 MG/1
TABLET ORAL
Qty: 90 TABLET | Refills: 3 | OUTPATIENT
Start: 2022-10-25

## 2022-10-25 RX ORDER — FENOFIBRATE 160 MG/1
TABLET ORAL
Qty: 90 TABLET | Refills: 3 | OUTPATIENT
Start: 2022-10-25

## 2022-10-25 RX ORDER — ALLOPURINOL 100 MG/1
TABLET ORAL
Qty: 180 TABLET | Refills: 3 | OUTPATIENT
Start: 2022-10-25

## 2023-01-27 NOTE — PROGRESS NOTES
ICD-10-CM ICD-9-CM    1. Routine adult health maintenance  Z00.00 V70.0       2. Type II diabetes mellitus with nephropathy (HCC)  E11.21 250.40 HEMOGLOBIN A1C WITH EAG     583.81       3. Stage 3b chronic kidney disease (HCC)  R22.88 424.0 METABOLIC PANEL, COMPREHENSIVE      4. Primary hypertension  I10 401.9       5. Atherosclerotic vascular disease  I70.90 440.9       6. Dyslipidemia  E78.5 272.4 LIPID PANEL               Subjective:     Chief Complaint   Patient presents with    Follow-up       Georgie Eli. is a 74 y.o. M.  he  has a past medical history of Atherosclerotic vascular disease (05/2018), CKD stage G3b/A1, GFR 30-44 and albumin creatinine ratio <30 mg/g ([facility] [address] Dyslipidemia, History of echocardiogram (05/2018), History of kidney stones (01/2012), History of pericarditis (05/23/2018), Hypertension, Idiopathic chronic gout, Obstructive sleep apnea syndrome, and Type II diabetes mellitus with nephropathy ([facility] [address] (09/18/2019). He has no past medical history of Arrhythmia, Arthritis, Asthma, Autoimmune disease ([address] Chronic kidney disease, Chronic pain, Coagulation defects, GERD (gastroesophageal reflux disease), Heart failure ([address] Liver disease, Nausea & vomiting, Psychiatric disorder, Seizures ([address] Stroke [facility]), or Thyroid disease. his last appointment in this clinic was 7/29/2022 . I reviewed and updated the medical record. At the patient's last visit, he had reported feeling fine. He was following regularly with his nephrologist for his history of chronic kidney disease. He was felt to be tolerating Chen Greg, as started previously by his prior primary care provider. Physical examination at the time had been generally unremarkable. He was referred to ophthalmology for routine diabetic evaluation. Routine laboratory studies were ordered. No changes were made to his medication regimen at the time. Today, the patient comes in for routine follow-up.   He reports feeling generally fine overall today and has no significant acute somatic complaints. Since his last visit, he has lost approximately 20 pounds or so. He attributes this to having made significant dietary modifications and increasing his exercise over the past few months. He continues on lisinopril for his blood pressure. He denies having had any coughing, lightheadedness, dizziness, or other problems with this. He does have a history of atherosclerotic vascular disease. He also continues on fenofibrate alone for his history of hypercholesterolemia. He denies any muscle aches or GI symptoms with the use of this medication. He has not been on statin medications in the past.  No recent chest pain, shortness breath, or any further cardiopulmonary concerns. He does not take any medications for diabetes currently. Previously, we had discussed the possibility of starting Trulicity, and I had prescribed this for him but he never ended up taking this. He denies any polyuria or polydipsia. He wonders if his A1c will have improved with his weight loss. He was seen in the summertime by ophthalmology for routine eye examination and by podiatry for routine diabetic foot examination. He continues to follow-up with his nephrologist for his history of kidney disease. He denies having had any recent foamy urine, hematuria, flank pain, or other problems. No lower extremity edema. His review of systems is otherwise negative. Routine Healthcare Maintenance issues are reviewed and discussed with the patient as noted below. Orders to update gaps in healthcare maintenance were placed as noted below in the Assessment and Plan, where applicable.      Past Medical History:  Past Medical History:   Diagnosis Date    Atherosclerotic vascular disease 05/2018    CXR Finding - Aorta    CKD stage G3b/A1, GFR 30-44 and albumin creatinine ratio <30 mg/g (HCC)     Dyslipidemia     History of echocardiogram 05/2018    Left ventricle: Systolic function was normal. Ejection fraction was estimated in the range of 60 % to 65 %. There were no regional wall motion abnormalities. History of kidney stones 01/2012    CT Finding    History of pericarditis 05/23/2018    idiopathic    Hypertension     Idiopathic chronic gout     gout    Obstructive sleep apnea syndrome     septum surgery to fix, still an issue    Type II diabetes mellitus with nephropathy ([facility] [address] 09/18/2019       Past Surgical Histor:  Past Surgical History:   Procedure Laterality Date    HX KNEE ARTHROSCOPY      HX OTHER SURGICAL      kidney stones       Allergies:  No Known Allergies    Medications:  Current Outpatient Medications   Medication Sig Dispense Refill    allopurinoL (ZYLOPRIM) 100 mg tablet TAKE 2 TABLETS DAILY 180 Tablet 3    fenofibrate (LOFIBRA) 160 mg tablet TAKE 1 TABLET DAILY WITH LUNCH 90 Tablet 3    lisinopriL (PRINIVIL, ZESTRIL) 2.5 mg tablet TAKE 1 TABLET DAILY 90 Tablet 3    cyanocobalamin (VITAMIN B12) 100 mcg tablet Take 100 mcg by mouth in the morning. aspirin (ASPIRIN) 325 mg tablet Take 1 Tab by mouth nightly. Potassium Citrate (UROCIT-K) TbER tablet Take 15 mEq by mouth daily (with lunch). vitamin E (AQUA GEMS) 400 unit capsule Take 400 Units by mouth daily (with lunch). cholecalciferol (VITAMIN D3) 1,000 unit tablet Take 500 Units by mouth two (2) times a day. ascorbic acid, vitamin C, (VITAMIN C) 500 mg tablet Take 500 mg by mouth daily (with lunch).          Social History:  Social History     Socioeconomic History    Marital status:    Tobacco Use    Smoking status: Never    Smokeless tobacco: Never   Vaping Use    Vaping Use: Never used   Substance and Sexual Activity    Alcohol use: No    Drug use: No       Family History:  Family History   Problem Relation Age of Onset    Diabetes Mother     Hypertension Father        Immunizations:  Immunization History   Administered Date(s) Administered    COVID-19, [employer] BLUE border, Primary or Immunocompromised, (age 18y+), IM, 100 mcg/0.5mL 04/10/2021, 05/18/2021    Influenza High Dose Vaccine PF 12/01/2021, 09/30/2022        Healthcare Maintenance:  Health Maintenance   Topic Date Due    Pneumococcal 65+ years (1 - PCV) Never done    Eye Exam Retinal or Dilated  Never done    DTaP/Tdap/Td series (1 - Tdap) Never done    Shingles Vaccine (1 of 2) Never done    Foot Exam Q1  09/18/2020    COVID-19 Vaccine (3 - Booster for Moderna series) 07/13/2021    Depression Screen  07/29/2023    A1C test (Diabetic or Prediabetic)  07/29/2023    Lipid Screen  07/29/2023    Medicare Yearly Exam  07/30/2023    Colorectal Cancer Screening Combo  08/06/2023    Hepatitis C Screening  Completed    Flu Vaccine  Completed        Review of Systems:  ROS:  Review of Systems   Constitutional:  Positive for weight loss (intentional). HENT: Negative. Eyes: Negative. Respiratory: Negative. Cardiovascular: Negative. Gastrointestinal: Negative. Genitourinary: Negative. Musculoskeletal: Negative. Skin: Negative. Neurological: Negative. Endo/Heme/Allergies: Negative. Psychiatric/Behavioral: Negative. ROS otherwise negative      Objective:     Vital Signs:  Visit Vitals  /80 (BP 1 Location: Left upper arm, BP Patient Position: Sitting, BP Cuff Size: Adult)   Pulse 68   Temp 97.7 °F (36.5 °C) (Oral)   Resp 18   Ht 5' 6\" (1.676 m)   Wt 166 lb (75.3 kg)   SpO2 99%   BMI 26.79 kg/m²       BMI:  Body mass index is 26.79 kg/m². Physical Examination:  Physical Exam  Vitals reviewed. Constitutional:       Appearance: Normal appearance. HENT:      Head: Normocephalic and atraumatic. Nose: Nose normal.      Mouth/Throat:      Mouth: Mucous membranes are moist.   Eyes:      Extraocular Movements: Extraocular movements intact. Conjunctiva/sclera: Conjunctivae normal.      Pupils: Pupils are equal, round, and reactive to light.    Cardiovascular:      Rate and Rhythm: Normal rate and regular rhythm. Pulses: Normal pulses. Heart sounds: Normal heart sounds. No murmur heard. No friction rub. No gallop. Pulmonary:      Effort: Pulmonary effort is normal. No respiratory distress. Breath sounds: Normal breath sounds. No wheezing, rhonchi or rales. Abdominal:      General: Bowel sounds are normal. There is no distension. Palpations: Abdomen is soft. There is no mass. Tenderness: There is no abdominal tenderness. There is no guarding or rebound. Musculoskeletal:         General: No tenderness, deformity or signs of injury. Normal range of motion. Cervical back: Normal range of motion and neck supple. Right lower leg: No edema. Left lower leg: No edema. Skin:     General: Skin is warm and dry. Findings: No bruising, lesion or rash. Neurological:      General: No focal deficit present. Mental Status: He is alert and oriented to person, place, and time. Mental status is at baseline. Cranial Nerves: No cranial nerve deficit. Sensory: No sensory deficit. Motor: No weakness. Coordination: Coordination normal.      Gait: Gait normal.      Deep Tendon Reflexes: Reflexes normal.   Psychiatric:         Mood and Affect: Mood normal.         Behavior: Behavior normal.        Physical exam otherwise negative    Diagnostic Testing:    Laboratory Studies:  No visits with results within 6 Month(s) from this visit. Latest known visit with results is:   Office Visit on 07/29/2022   Component Date Value Ref Range Status    Cholesterol, total 07/29/2022 187  <200 MG/DL Final    Triglyceride 07/29/2022 216 (A)  <150 MG/DL Final    Based on NCEP-ATP III:  Triglycerides <150 mg/dL  is considered normal, 150-199 mg/dL  borderline high,  200-499 mg/dL high and  greater than or equal to 500 mg/dL very high.     HDL Cholesterol 07/29/2022 32  MG/DL Final    Based on NCEP ATP III, HDL Cholesterol <40 mg/dL is considered low and >60 mg/dL is elevated. LDL, calculated 07/29/2022 111.8 (A)  0 - 100 MG/DL Final    Comment: Based on the NCEP-ATP: LDL-C concentrations are considered  optimal <100 mg/dL,  near optimal/above Normal 100-129 mg/dL  Borderline High: 130-159, High: 160-189 mg/dL  Very High: Greater than or equal to 190 mg/dL      VLDL, calculated 07/29/2022 43.2  MG/DL Final    CHOL/HDL Ratio 07/29/2022 5.8 (A)  0.0 - 5.0   Final    Sodium 07/29/2022 140  136 - 145 mmol/L Final    Potassium 07/29/2022 4.9  3.5 - 5.1 mmol/L Final    Chloride 07/29/2022 107  97 - 108 mmol/L Final    CO2 07/29/2022 25  21 - 32 mmol/L Final    Anion gap 07/29/2022 8  5 - 15 mmol/L Final    Glucose 07/29/2022 95  65 - 100 mg/dL Final    BUN 07/29/2022 46 (A)  6 - 20 MG/DL Final    Creatinine 07/29/2022 2.27 (A)  0.70 - 1.30 MG/DL Final    BUN/Creatinine ratio 07/29/2022 20  12 - 20   Final    GFR est AA 07/29/2022 34 (A)  >60 ml/min/1.73m2 Final    GFR est non-AA 07/29/2022 28 (A)  >60 ml/min/1.73m2 Final    Estimated GFR is calculated using the IDMS-traceable Modification of Diet in Renal Disease (MDRD) Study equation, reported for both  Americans (GFRAA) and non- Americans (GFRNA), and normalized to 1.73m2 body surface area. The physician must decide which value applies to the patient. Calcium 07/29/2022 9.7  8.5 - 10.1 MG/DL Final    Bilirubin, total 07/29/2022 0.8  0.2 - 1.0 MG/DL Final    ALT (SGPT) 07/29/2022 42  12 - 78 U/L Final    AST (SGOT) 07/29/2022 25  15 - 37 U/L Final    Alk. phosphatase 07/29/2022 48  45 - 117 U/L Final    Protein, total 07/29/2022 7.4  6.4 - 8.2 g/dL Final    Albumin 07/29/2022 4.1  3.5 - 5.0 g/dL Final    Globulin 07/29/2022 3.3  2.0 - 4.0 g/dL Final    A-G Ratio 07/29/2022 1.2  1.1 - 2.2   Final    WBC 07/29/2022 5.0  4.1 - 11.1 K/uL Final    RBC 07/29/2022 4.10  4. 10 - 5.70 M/uL Final    HGB 07/29/2022 14.1  12.1 - 17.0 g/dL Final    HCT 07/29/2022 43.2  36.6 - 50.3 % Final    MCV 07/29/2022 105.4 (A)  80.0 - 99.0 FL Final    MCH 07/29/2022 34.4 (A)  26.0 - 34.0 PG Final    MCHC 07/29/2022 32.6  30.0 - 36.5 g/dL Final    RDW 07/29/2022 14.3  11.5 - 14.5 % Final    PLATELET 86/25/5848 183  150 - 400 K/uL Final    MPV 07/29/2022 12.0  8.9 - 12.9 FL Final    NRBC 07/29/2022 0.0  0  WBC Final    ABSOLUTE NRBC 07/29/2022 0.00  0.00 - 0.01 K/uL Final    NEUTROPHILS 07/29/2022 38  32 - 75 % Final    LYMPHOCYTES 07/29/2022 39  12 - 49 % Final    MONOCYTES 07/29/2022 11  5 - 13 % Final    EOSINOPHILS 07/29/2022 11 (A)  0 - 7 % Final    BASOPHILS 07/29/2022 1  0 - 1 % Final    IMMATURE GRANULOCYTES 07/29/2022 0  0.0 - 0.5 % Final    ABS. NEUTROPHILS 07/29/2022 1.9  1.8 - 8.0 K/UL Final    ABS. LYMPHOCYTES 07/29/2022 1.9  0.8 - 3.5 K/UL Final    ABS. MONOCYTES 07/29/2022 0.5  0.0 - 1.0 K/UL Final    ABS. EOSINOPHILS 07/29/2022 0.6 (A)  0.0 - 0.4 K/UL Final    ABS. BASOPHILS 07/29/2022 0.1  0.0 - 0.1 K/UL Final    ABS. IMM. GRANS. 07/29/2022 0.0  0.00 - 0.04 K/UL Final    DF 07/29/2022 AUTOMATED    Final    Hemoglobin A1c 07/29/2022 8.2 (A)  4.0 - 5.6 % Final    Comment: NEW METHOD  PLEASE NOTE NEW REFERENCE RANGE  (NOTE)  HbA1C Interpretive Ranges  <5.7              Normal  5.7 - 6.4         Consider Prediabetes  >6.5              Consider Diabetes      Est. average glucose 07/29/2022 189  mg/dL Final    Microalbumin,urine random 07/29/2022 0.69  MG/DL Final    No reference range has been established. Creatinine, urine random 07/29/2022 123.00  mg/dL Final    No reference range has been established. Microalbumin/Creat ratio (mg/g cre* 07/29/2022 6  0 - 30 mg/g Final         Radiographic Studies:  XR Results (most recent):  Results from [place] encounter on 05/18/18    XR CHEST PA LAT    Narrative  EXAM:  XR CHEST PA LAT. INDICATION: Chest pain. COMPARISON: 5/12/2010. FINDINGS:  PA and lateral radiographs of the chest were obtained. Lungs:  The lungs are clear of mass, nodule, airspace disease or edema. Pleura: There is no pleural effusion or pneumothorax. Mediastinum: The cardiac and mediastinal contours and pulmonary vascularity are  normal.  The aorta is atherosclerotic. Bones and soft tissues: There are mild degenerative changes of the spine. Impression  IMPRESSION: No acute abnormality and no change. IRENE Results (most recent):  No results found for this or any previous visit. CT Results (most recent):  Results from Hospital Encounter encounter on 01/31/12    CT ABDOMEN PELVIS WITHOUT CONTRAST    Narrative  **Final Report**      ICD Codes / Adm. Diagnosis: 443133   / Flank Pain  Examination:  CT ABD PELVIS WO CON  - [id number] - Jan 31 2012  5:08AM  Accession No:  [id number]  Reason:  suspect left ureteral stone      REPORT:  INDICATION:   suspect left ureteral stone    COMPARISON: 2007. TECHNIQUE:  Unenhanced multislice helical CT was performed from the diaphragm to the  symphysis pubis without oral or intravenous contrast administration. Contiguous 5 mm axial images were reconstructed and lung and soft tissue  windows were generated. Coronal reformations were generated. FINDINGS:  The visualized portions of the lung bases are clear. The absence of intravenous contrast material reduces the sensitivity for  evaluation of the solid parenchymal organs of the abdomen. No focal  abnormalities are seen within the liver, spleen, pancreas or adrenal glands  or kidneys. There are 2 nonobstructing right lower pole calculus individually measures 3  mm. There is a nonobstructing left lower renal pole calculus measures 4 mm. There is left hydronephrosis and hydroureter to a proximal ureteral 4.4 mm  calculus (coronal image 44). A moderate inflammatory change surrounds the  left kidney. The aorta tapers without aneurysm. There is no retroperitoneal adenopathy  or mass. The bowel is not obstructed. The appendix is not seen.  There is no ascites  or free intraperitoneal air.    The prostate is normal.   There is no pelvic mass or adenopathy. IMPRESSION:  Normal abdomen. Nonobstructing bilateral renal calculi  Left proximal ureteral obstructing calculus. Associated inflammatory changes  left kidney. This may be secondary to obstruction. Please clinically exclude  superinfection. Signing/Reading Doctor: Maritza Turcios ([id number])  Approved: Maritza Turcios ([id number])  01/31/2012     DEXA Results (most recent):  No results found for this or any previous visit. MRI Results (most recent):  No results found for this or any previous visit. Assessment/Plan:       ICD-10-CM ICD-9-CM    1. Routine adult health maintenance  Z00.00 V70.0       2. Type II diabetes mellitus with nephropathy (HCC)  E11.21 250.40 HEMOGLOBIN A1C WITH EAG     583.81       3. Stage 3b chronic kidney disease (HCC)  D16.24 639.2 METABOLIC PANEL, COMPREHENSIVE      4. Primary hypertension  I10 401.9       5. Atherosclerotic vascular disease  I70.90 440.9       6. Dyslipidemia  E78.5 272.4 LIPID PANEL                 Healthcare Maintenance:  - Preventive measures are reviewed as per above  - Up to date on routine interventions except as noted above  - Orders placed to update gaps as noted  - Notes: repeat labs ordered      Chronic Kidney Disease:   - Last Serum Creatinine:   Lab Results   Component Value Date/Time    Creatinine (POC) 2.3 (A) 01/31/2018 08:34 AM    Creatinine 2.27 (H) 07/29/2022 02:56 PM       - Last GFR:   Lab Results   Component Value Date/Time    GFR est AA 34 (L) 07/29/2022 02:56 PM    GFR est non-AA 28 (L) 07/29/2022 02:56 PM      - Current Stage by eGFR: G3b-G4   - Proteinuria: A1   -   Key CKD Meds               lisinopriL (PRINIVIL, ZESTRIL) 2.5 mg tablet (Taking) TAKE 1 TABLET DAILY    Potassium Citrate (UROCIT-K) TbER tablet (Taking) Take 15 mEq by mouth daily (with lunch). cholecalciferol (VITAMIN D3) 1,000 unit tablet (Taking) Take 500 Units by mouth two (2) times a day. - Blood Pressure Target: See Hypertension/Blood Pressure Management Section   - Advised avoidance of NSAIDs and other nephrotoxins wherever possible   - Advised renal dosing of medications where necessary   - Acid/Base Management: ---   - Phosphorus Management: Vit D   - Nephrology Consultation: ongoing   - Notes: [facility]    Diabetes Mellitus:   - Type: Type 2 Diabetes   - Current A1C:   Lab Results   Component Value Date/Time    Hemoglobin A1c 8.2 (H) 07/29/2022 02:56 PM    Hemoglobin A1c (POC) 6.4 (A) 01/31/2018 08:34 AM    Hemoglobin A1c, External 6.1 01/18/2017 12:00 AM       - Target A1C: <4-1.3%   - Complications: none   - Relevant Diabetes Medications:  Key Antihyperglycemic Medications       Patient is on no antihyperglycemic meds. - General Recommendations discussed today: ---   - Notes: repeat A1C pending, consider GLP1a vs. SGLT2i if still not at target s/p wt loss        Essential Hypertension/Blood Pressure Management:   - Home BP Readings: not doing   - Current Control: optimal   - Target BP: <130/80 mmHg - Relevant BP Meds:  Key CAD CHF Meds               fenofibrate (LOFIBRA) 160 mg tablet (Taking) TAKE 1 TABLET DAILY WITH LUNCH    lisinopriL (PRINIVIL, ZESTRIL) 2.5 mg tablet (Taking) TAKE 1 TABLET DAILY    aspirin (ASPIRIN) 325 mg tablet (Taking) Take 1 Tab by mouth nightly.            - Plan: continue current treatment regimen, continue current meds, dietary sodium restriction, regular aerobic exercise, weight loss   - Notes: [facility]      Hyperlipidemia/Dyslipidemia:   - Summary of Cardiovascular Risks and Goals:     LDL goal is under 100  diabetic  hypertension  hyperlipidemia  [place] 10-year risk >20%   -   Lab Results   Component Value Date/Time    LDL, calculated 111.8 (H) 07/29/2022 02:56 PM    HDL Cholesterol 32 07/29/2022 02:56 PM       - Relevant Cholesterol Meds:  Key Antihyperlipidemia Meds               fenofibrate (LOFIBRA) 160 mg tablet (Taking) TAKE 1 TABLET DAILY WITH LUNCH              - Cholesterol at target: no   - Does patient meet USPSTF and ACC/AHA indications for pharmacotherapy (e.g., statin): yes   - GI symptoms with meds: NO   - Muscle aches with meds: NO   - Other Adverse effects with meds: NO   - Medication Plan: continue   - Notes: repeat LDL pending, consider switch to atorvastatin from fenofibrate depending on LDL if not at target            I have reviewed the patient's medical history in detail and updated the computerized patient record. We had a prolonged discussion about these complex clinical issues and went over the various important aspects to consider. All questions were answered. Advised the patient to call back or return to office if symptoms do not improve, change in nature, or persist.     The patient was given an after visit summary or informed of [facility] which includes patient instructions, diagnoses, current medications, & vitals. he expressed understanding with the diagnosis and plan. Prema Santacruz MD    Please note that this dictation was completed with AQS, the computer voice recognition software. Quite often unanticipated grammatical, syntax, homophones, and other interpretive errors are inadvertently transcribed by the computer software. Please disregard these errors. Please excuse any errors that have escaped final proofreading.

## 2023-01-31 ENCOUNTER — OFFICE VISIT (OUTPATIENT)
Dept: INTERNAL MEDICINE CLINIC | Age: 73
End: 2023-01-31
Payer: MEDICARE

## 2023-01-31 VITALS
HEIGHT: 66 IN | TEMPERATURE: 97.7 F | OXYGEN SATURATION: 99 % | WEIGHT: 166 LBS | BODY MASS INDEX: 26.68 KG/M2 | SYSTOLIC BLOOD PRESSURE: 132 MMHG | DIASTOLIC BLOOD PRESSURE: 80 MMHG | RESPIRATION RATE: 18 BRPM | HEART RATE: 68 BPM

## 2023-01-31 DIAGNOSIS — I70.90 ATHEROSCLEROTIC VASCULAR DISEASE: ICD-10-CM

## 2023-01-31 DIAGNOSIS — I10 PRIMARY HYPERTENSION: ICD-10-CM

## 2023-01-31 DIAGNOSIS — N18.32 STAGE 3B CHRONIC KIDNEY DISEASE (HCC): ICD-10-CM

## 2023-01-31 DIAGNOSIS — E11.21 TYPE II DIABETES MELLITUS WITH NEPHROPATHY (HCC): ICD-10-CM

## 2023-01-31 DIAGNOSIS — Z00.00 ROUTINE ADULT HEALTH MAINTENANCE: Primary | ICD-10-CM

## 2023-01-31 DIAGNOSIS — E78.5 DYSLIPIDEMIA: ICD-10-CM

## 2023-01-31 PROCEDURE — 1101F PT FALLS ASSESS-DOCD LE1/YR: CPT | Performed by: INTERNAL MEDICINE

## 2023-01-31 PROCEDURE — G8417 CALC BMI ABV UP PARAM F/U: HCPCS | Performed by: INTERNAL MEDICINE

## 2023-01-31 PROCEDURE — G8510 SCR DEP NEG, NO PLAN REQD: HCPCS | Performed by: INTERNAL MEDICINE

## 2023-01-31 PROCEDURE — 99214 OFFICE O/P EST MOD 30 MIN: CPT | Performed by: INTERNAL MEDICINE

## 2023-01-31 PROCEDURE — G8427 DOCREV CUR MEDS BY ELIG CLIN: HCPCS | Performed by: INTERNAL MEDICINE

## 2023-01-31 PROCEDURE — 2022F DILAT RTA XM EVC RTNOPTHY: CPT | Performed by: INTERNAL MEDICINE

## 2023-01-31 PROCEDURE — 1123F ACP DISCUSS/DSCN MKR DOCD: CPT | Performed by: INTERNAL MEDICINE

## 2023-01-31 PROCEDURE — 3075F SYST BP GE 130 - 139MM HG: CPT | Performed by: INTERNAL MEDICINE

## 2023-01-31 PROCEDURE — 3046F HEMOGLOBIN A1C LEVEL >9.0%: CPT | Performed by: INTERNAL MEDICINE

## 2023-01-31 PROCEDURE — G8536 NO DOC ELDER MAL SCRN: HCPCS | Performed by: INTERNAL MEDICINE

## 2023-01-31 PROCEDURE — 3017F COLORECTAL CA SCREEN DOC REV: CPT | Performed by: INTERNAL MEDICINE

## 2023-01-31 PROCEDURE — 3079F DIAST BP 80-89 MM HG: CPT | Performed by: INTERNAL MEDICINE

## 2023-01-31 NOTE — PROGRESS NOTES
Chief Complaint   Patient presents with    Follow-up     Visit Vitals  /80 (BP 1 Location: Left upper arm, BP Patient Position: Sitting, BP Cuff Size: Adult)   Pulse 68   Temp 97.7 °F (36.5 °C) (Oral)   Resp 18   Ht 5' 6\" (1.676 m)   Wt 166 lb (75.3 kg)   SpO2 99%   BMI 26.79 kg/m²         1. \"Have you been to the ER, urgent care clinic since your last visit? Hospitalized since your last visit? \" No    2. \"Have you seen or consulted any other health care providers outside of the [address] [place] [address] since your last visit? \" No     3. For patients aged 39-70: Has the patient had a colonoscopy / FIT/ Cologuard? No      If the patient is female:    4. For patients aged 41-77: Has the patient had a mammogram within the past 2 years? No      5. For patients aged 21-65: Has the patient had a pap smear?  No

## 2023-01-31 NOTE — PATIENT INSTRUCTIONS
Atorvastatin (By mouth)   Atorvastatin (a-tor-va-STAT-in)  Treats high cholesterol and triglyceride levels. Reduces the risk of angina, stroke, heart attack, or certain heart and blood vessel problems. This medicine is a statin. Brand Name(s): Lipitor   There may be other brand names for this medicine. When This Medicine Should Not Be Used: This medicine is not right for everyone. Do not use it if you had an allergic reaction to atorvastatin, if you have active liver disease, or if you are pregnant or breastfeeding. How to Use This Medicine:   Tablet  Take your medicine as directed. Your dose may need to be changed several times to find what works best for you. Take this medicine at the same time each day. Swallow the tablet whole. Do not break it. Missed dose: Take a dose as soon as you remember. If it is less than 12 hours until your next dose, skip the missed dose and take the next dose at the regular time. Do not take 2 doses of this medicine within 12 hours. Read and follow the patient instructions that come with this medicine. Talk to your doctor or pharmacist if you have any questions. Store the medicine in a closed container at room temperature, away from heat, moisture, and direct light. Drugs and Foods to Avoid:   Ask your doctor or pharmacist before using any other medicine, including over-the-counter medicines, vitamins, and herbal products. Some medicines can affect how atorvastatin works. Tell your doctor if you also use birth control pills, boceprevir, cimetidine, colchicine, cyclosporine, digoxin, niacin, rifampin, spironolactone, telaprevir, medicine to treat an infection, or medicine to treat HIV/AIDS. Warnings While Using This Medicine: It is not safe to take this medicine during pregnancy. It could harm an unborn baby. Tell your doctor right away if you become pregnant.   Tell your doctor if you have kidney disease, diabetes, muscle pain or weakness, thyroid problems, have recently had a stroke or TIA (transient ischemic attack), or have a history of liver disease. Tell your doctor if you drink grapefruit juice or drink alcohol regularly. This medicine can cause muscle problems, which can lead to kidney problems. Tell any doctor or dentist who treats you that you use this medicine. You may need to stop using it if you have surgery, have an injury, or develop serious health problems. Your doctor will do lab tests at regular visits to check on the effects of this medicine. Keep all appointments. Keep all medicine out of the reach of children. Never share your medicine with anyone. Possible Side Effects While Using This Medicine:   Call your doctor right away if you notice any of these side effects: Allergic reaction: Itching or hives, swelling in your face or hands, swelling or tingling in your mouth or throat, chest tightness, trouble breathing  Blistering, peeling, red skin rash  Change in how much or how often you urinate  Dark urine or pale stools, nausea, vomiting, loss of appetite, stomach pain, yellow skin or eyes  Fever  Muscle pain, tenderness, or weakness  Unusual tiredness  If you notice these less serious side effects, talk with your doctor:   Diarrhea  Joint pain  If you notice other side effects that you think are caused by this medicine, tell your doctor. Call your doctor for medical advice about side effects. You may report side effects to FDA at 3-514-FDA-4856  © 2017 [facility] Information is for End User's use only and may not be sold, redistributed or otherwise used for commercial purposes. The above information is an  only. It is not intended as medical advice for individual conditions or treatments. Talk to your doctor, nurse or pharmacist before following any medical regimen to see if it is safe and effective for you.

## 2023-02-01 LAB
ALBUMIN SERPL-MCNC: 4.3 G/DL (ref 3.5–5)
ALBUMIN/GLOB SERPL: 1.4 (ref 1.1–2.2)
ALP SERPL-CCNC: 45 U/L (ref 45–117)
ALT SERPL-CCNC: 28 U/L (ref 12–78)
ANION GAP SERPL CALC-SCNC: 6 MMOL/L (ref 5–15)
AST SERPL-CCNC: 23 U/L (ref 15–37)
BILIRUB SERPL-MCNC: 0.8 MG/DL (ref 0.2–1)
BUN SERPL-MCNC: 49 MG/DL (ref 6–20)
BUN/CREAT SERPL: 22 (ref 12–20)
CALCIUM SERPL-MCNC: 9.9 MG/DL (ref 8.5–10.1)
CHLORIDE SERPL-SCNC: 107 MMOL/L (ref 97–108)
CHOLEST SERPL-MCNC: 161 MG/DL
CO2 SERPL-SCNC: 26 MMOL/L (ref 21–32)
CREAT SERPL-MCNC: 2.21 MG/DL (ref 0.7–1.3)
EST. AVERAGE GLUCOSE BLD GHB EST-MCNC: 120 MG/DL
GLOBULIN SER CALC-MCNC: 3 G/DL (ref 2–4)
GLUCOSE SERPL-MCNC: 104 MG/DL (ref 65–100)
HBA1C MFR BLD: 5.8 % (ref 4–5.6)
HDLC SERPL-MCNC: 39 MG/DL
HDLC SERPL: 4.1 (ref 0–5)
LDLC SERPL CALC-MCNC: 94.8 MG/DL (ref 0–100)
POTASSIUM SERPL-SCNC: 4.7 MMOL/L (ref 3.5–5.1)
PROT SERPL-MCNC: 7.3 G/DL (ref 6.4–8.2)
SODIUM SERPL-SCNC: 139 MMOL/L (ref 136–145)
TRIGL SERPL-MCNC: 136 MG/DL (ref ?–150)
VLDLC SERPL CALC-MCNC: 27.2 MG/DL

## 2023-02-01 NOTE — PROGRESS NOTES
Patient: Linh Gibbs MRN: [id number]  SSN: [id number]    YOB: 1959  Age: 59 y.o. Sex: male      Chief Complaint   Patient presents with    Fall     hit arm and bumped head     Linh Gibbs is a 59 y.o. male walk in who sustained laceration of left forearm 30 minutes ago. Nature of injury: patient fell. Tetanus vaccination status reviewed: last tetanus booster within 10 years. Patioent with hx of brain injury and frequent falls. No LOC. Complains of left shoulder pain. Patient denies HA, dizziness, SOB, CP, weakness or paresthesia. .Patient sans other complaints or concerns at this time. Patient Active Problem List   Diagnosis Code    Hand pain M79.643    Tobacco abuse Z72.0    HTN (hypertension) I10    Tinnitus H93.19    Chest pain, unspecified R07.9    Back pain M54.9    Chronic SI joint pain M53.3, G89.29    Hyperlipidemia E78.5    Hearing loss of both ears H91.93    Neurogenic pain M79.2    Skull fracture (HCC) S02. 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 No pertinent surgical history. Social History     Social History    Marital status: SINGLE     Spouse name: N/A    Number of children: N/A    Years of education: N/A     Occupational History    Not on file. Social History Main Topics    Smoking status: Current Every Day Smoker     Packs/day: 1.00     Types: Cigarettes    Smokeless tobacco: Never Used    Alcohol use No    Drug use: No    Sexual activity: Not on file     Other Topics Concern    Not on file     Social History Narrative     Family History   Problem Relation Age of Onset    Family history unknown: Yes     Current Outpatient Prescriptions   Medication Sig    mupirocin (BACTROBAN) 2 % ointment Apply  to affected area daily.  diclofenac EC (VOLTAREN) 75 mg EC tablet Take 1 Tab by mouth two (2) times a day.     metoprolol tartrate (LOPRESSOR) 25 mg tablet TAKE 1/2 Please call the patient regarding his diagnostic evaluation. LDL is reasonable but still could be improved, he should continue with atorvastatin as we discussed. Stable chronic kidney disease stage IIIb. Stable A1c, significantly improved compared to 6 months ago. Continue otherwise with current care. TABLET BY MOUTH TWICE DAILY    carBAMazepine (TEGRETOL) 100 mg chewable tablet CHEW & SWALLOW 1 TABLET BY MOUTH 3 TIMES A DAY    pravastatin (PRAVACHOL) 10 mg tablet TAKE ONE TABLET BY MOUTH NIGHTLY    FLUoxetine (PROZAC) 10 mg capsule Take 1 Cap by mouth daily.  losartan (COZAAR) 100 mg tablet TAKE ONE TABLET BY MOUTH DAILY    aspirin delayed-release 81 mg tablet Take  by mouth daily.  cetirizine (ZYRTEC) 10 mg tablet Take 1 Tab by mouth daily. No current facility-administered medications for this visit. No Known Allergies    Review of Systems  Constitutional: feeling well, negative for fever, chills  Skin: see HPI  Respiratory: negative for cough, wheezing or SOB  Cardiovascular: negative for chest pain, dizziness or palpitations  Musculoskeletal: negative for myalgias or arthralgias   Neurological: negative for numbness, weakness or paresthesia      Vitals:    07/26/17 1435   BP: 130/79   Pulse: 69   Resp: 20   Temp: 97.5 °F (36.4 °C)   TempSrc: Oral   SpO2: 96%       Physical Examination:  General: Well developed, well nourished in no acute distress  Skin: superficial abrasion of left forearm  Head: Normocephalic, atraumatic  Eyes: Sclera clear, EOMI, PERRL  Oropharynx: Moist mucous membranes   Neck: Normal range of motion  Respiratory: CTAB with symmetrical, unlabored effort  Cardiovascular: Normal S1, S2, Regular rate and rhythm  Extremities: Full range of motion, no edema  Neurology: Active, alert and oriented, No focal deficits  Psych: Affect appropriate     Diagnoses and all orders for this visit:    1. Abrasion of left arm, initial encounter    2. Shoulder strain, left, initial encounter    3. Fall, initial encounter    Other orders  -     mupirocin (BACTROBAN) 2 % ointment; Apply  to affected area daily. -     diclofenac EC (VOLTAREN) 75 mg EC tablet; Take 1 Tab by mouth two (2) times a day.         PLAN:    Wound cleansed, debrided of visible foreign material. Antibiotic ointment and dressing applied. Wound care instructions provided. Observe for any signs of infection or other problems. I have discussed the diagnosis with the patient and the intended plan as seen in the above orders. The patient expresses understanding and agreement with our plan of care. The patient has received an after-visit summary. The patient knows to call our office if there are any questions or concerns regarding diagnosis and treatment plans. I have discussed medication side effects and warnings with the patient as well.       Follow-up Disposition: Not on File

## 2023-04-21 DIAGNOSIS — E11.21 TYPE II DIABETES MELLITUS WITH NEPHROPATHY (HCC): Primary | ICD-10-CM

## 2023-04-26 NOTE — PROGRESS NOTES
Subjective: (As above and below)     Chief Complaint   Patient presents with    [address] [place] [address] Stella Joseph. is a 74 y.o. M.  and presents to the office to establish care. He has a past medical history of Atherosclerotic vascular disease (05/2018), CKD stage G3b/A1, GFR 30-44 and albumin creatinine ratio <30 mg/g ([facility] Dyslipidemia, History of echocardiogram (05/2018), History of kidney stones (01/2012), History of pericarditis (05/23/2018), Hypertension, Idiopathic chronic gout, Obstructive sleep apnea syndrome, and Type II diabetes mellitus with nephropathy ([facility] (09/18/2019). He had a routine physical done on 1/2023 with Dr. Noemi Burgos      Today, the patient comes in to establish care and he reports feeling generally fine overall today and has no significant acute somatic complaints. The patient has essential hypertension and HLD  Current medication: Lisinopril 2.5 mg and fenofibrate. Compliance? Yes- tolerating medication well. Home BP Monitoring: Yes and he notes he has a BP machine at home that will then send BP readings to his nephrologist.   He notes he gets very anxious when he has to get his blood pressure check and so it is typically elevated in the office. He also states he takes his lisinopril around noon each day so he has not taken his BP medication before this visit. Pertinent ROS: no TIA's, no chest pain on exertion, no dyspnea on exertion, no swelling of ankles. He does not take any medications for diabetes currently. Per chart review it was noted possibly starting trulicity but he never ended up taking the medication. He denies any polyuria or polydipsia. Denies any numbness/tingling. He wonders if his A1c will have improved with his weight loss. He was seen in the summertime by ophthalmology for routine eye examination and by podiatry for routine diabetic foot examination. Last HgbA1c (1/2023): 5.8.     He got frustrated with his previous elevated lab work and so he started eating chicken, turkey and fish and looks like he has lost about 20 lbs over 6 months. He rarely eats any sweets. He checks his feet regularly and denies any wounds. He currently has regular follow-up with his nephrologist for his history of stage 3 kidney disease Dr. Rock Haywood with [facility]. He reports that Dr. Tim Juarez has been managing his blood pressure. He has a machine at home that will send his BP readings. He notes they are in the [id number] systolic. He denies having had any recent foamy urine, hematuria, flank pain, or other problems. No lower extremity edema. He does not wear a CPAP machine for his BETSY. He reports he has never been tested for BETSY. He notes he had a deviated septum from hx of broken nose and went to ? Dr. Jax Graham and had this corrected. He notes he is breathing better since the repair. He notes wife notes he does snore. Denies waking up gasping for air not feeling fatigue in the morning. He has a history of prostate cancer 12 years ago. He reports having his prostate removed by Dr. Salome Boland at [facility]. He does continue to see Dr. Salome Boland once a year and patient reports PSA has been 0. Denies any foam in urine, blood in urine, flank pain and unintentional weight loss. Nutrition Hx:  Diet: Generally eat a low carb and low salt diet. Exercise: Lifts weights every other day and walk about 1.5 miles 4 times a week. Reviewed and agree with Nurse Note duplicated in this note. Reviewed PmHx, RxHx, FmHx, SocHx, AllgHx and updated in chart. Current Outpatient Medications   Medication Sig    aspirin 81 mg chewable tablet Take 1 Tablet by mouth daily. allopurinoL (ZYLOPRIM) 100 mg tablet TAKE 2 TABLETS DAILY    fenofibrate (LOFIBRA) 160 mg tablet TAKE 1 TABLET DAILY WITH LUNCH    lisinopriL (PRINIVIL, ZESTRIL) 2.5 mg tablet TAKE 1 TABLET DAILY    cyanocobalamin (VITAMIN B12) 100 mcg tablet Take 1 Tablet by mouth daily.     Potassium Citrate (UROCIT-K) TbER tablet Take 1 Tablet by mouth daily (with lunch). vitamin E (AQUA GEMS) 400 unit capsule Take 1 Capsule by mouth daily (with lunch). cholecalciferol (VITAMIN D3) 1,000 unit tablet Take 0.5 Tablets by mouth two (2) times a day. ascorbic acid, vitamin C, (VITAMIN C) 500 mg tablet Take 1 Tablet by mouth daily (with lunch). No current facility-administered medications for this visit. No Known Allergies   Past Medical History:   Diagnosis Date    Atherosclerotic vascular disease 05/2018    CXR Finding - Aorta    CKD stage G3b/A1, GFR 30-44 and albumin creatinine ratio <30 mg/g (HCC)     Dyslipidemia     History of echocardiogram 05/2018    Left ventricle: Systolic function was normal. Ejection fraction was estimated in the range of 60 % to 65 %. There were no regional wall motion abnormalities.     History of kidney stones 01/2012    CT Finding    History of pericarditis 05/23/2018    idiopathic    Hypertension     Idiopathic chronic gout     gout    Obstructive sleep apnea syndrome     septum surgery to fix, still an issue    Type II diabetes mellitus with nephropathy ([facility] 09/18/2019      Past Surgical History:   Procedure Laterality Date    HX KNEE ARTHROSCOPY      HX OTHER SURGICAL      kidney stones      Family History   Problem Relation Age of Onset    Diabetes Mother     Hypertension Father       Social History     Socioeconomic History    Marital status:      Spouse name: Not on file    Number of children: Not on file    Years of education: Not on file    Highest education level: Not on file   Occupational History    Not on file   Tobacco Use    Smoking status: Never     Passive exposure: Never    Smokeless tobacco: Never   Vaping Use    Vaping Use: Never used   Substance and Sexual Activity    Alcohol use: No    Drug use: No    Sexual activity: Yes     Partners: Female   Other Topics Concern    Not on file   Social History Narrative    Not on file     Social Determinants of Health     Financial Resource Strain: Not on file   Food Insecurity: Not on file   Transportation Needs: Not on file   Physical Activity: Sufficiently Active    Days of Exercise per Week: 5 days    Minutes of Exercise per Session: 60 min   Stress: Not on file   Social Connections: Not on file   Intimate Partner Violence: Not At Risk    Fear of Current or Ex-Partner: No    Emotionally Abused: No    Physically Abused: No    Sexually Abused: No   Housing Stability: Not on file             Review of Systems   Constitutional:  Negative for chills, diaphoresis, fever, malaise/fatigue and weight loss. HENT: Negative. Eyes: Negative. Respiratory:  Negative for cough and shortness of breath. Cardiovascular:  Negative for chest pain, palpitations and leg swelling. Gastrointestinal:  Negative for abdominal pain, blood in stool, constipation, diarrhea, heartburn, melena, nausea and vomiting. Genitourinary:  Negative for dysuria, flank pain, frequency, hematuria and urgency. Musculoskeletal: Negative. Skin: Negative. Neurological:  Negative for dizziness, tingling, tremors, sensory change, speech change, focal weakness, seizures, loss of consciousness, weakness and headaches. Endo/Heme/Allergies: Negative. Psychiatric/Behavioral: Negative. Objective:     Physical Examination:    Visit Vitals  BP (!) 154/70 (BP 1 Location: Right arm, BP Patient Position: Sitting, BP Cuff Size: Adult)   Pulse 68   Temp 98 °F (36.7 °C) (Temporal)   Resp 17   Ht 5' 6\" (1.676 m)   Wt 161 lb (73 kg)   SpO2 98%   BMI 25.99 kg/m²       Physical Exam  Vitals and nursing note reviewed. Constitutional:       General: He is not in acute distress. Appearance: Normal appearance. HENT:      Head: Normocephalic.       Right Ear: Tympanic membrane, ear canal and external ear normal.      Left Ear: Tympanic membrane, ear canal and external ear normal.      Nose: Nose normal.      Mouth/Throat:      Mouth: Mucous membranes are moist.      Pharynx: Oropharynx is clear. Eyes:      Extraocular Movements: Extraocular movements intact. Conjunctiva/sclera: Conjunctivae normal.      Pupils: Pupils are equal, round, and reactive to light. Neck:      Thyroid: No thyromegaly or thyroid tenderness. Vascular: No carotid bruit. Cardiovascular:      Rate and Rhythm: Normal rate and regular rhythm. Pulses: Normal pulses. Heart sounds: Normal heart sounds. Pulmonary:      Effort: Pulmonary effort is normal. No respiratory distress. Breath sounds: Normal breath sounds. Abdominal:      General: Bowel sounds are normal. There is no distension. Palpations: Abdomen is soft. Tenderness: There is no abdominal tenderness. Musculoskeletal:      Cervical back: No tenderness. Right lower leg: No edema. Left lower leg: No edema. Lymphadenopathy:      Cervical: No cervical adenopathy. Skin:     General: Skin is warm and dry. Neurological:      General: No focal deficit present. Mental Status: He is alert and oriented to person, place, and time. Mental status is at baseline. Psychiatric:         Mood and Affect: Mood normal.         Behavior: Behavior normal.         Thought Content: Thought content normal.         Judgment: Judgment normal.           3 most recent PHQ Screens 4/27/2023   Little interest or pleasure in doing things Not at all   Feeling down, depressed, irritable, or hopeless Not at all   Total Score PHQ 2 0      Assessment/ Plan:   Differential diagnosis and treatment options reviewed with patient who is in agreement with treatment plan as outlined below. 1. Encounter for medical examination to establish care    2. Primary hypertension  Continue with lisinopril.    Patient was very hesitant to return for BP check after he takes his medication and to make any medication changes without the approval of his nephrologist. Patient notes he takes his BP at home and readings get sent to nephrologist.   He notes he gets very anxious when he has to think about getting his BP checked. He would prefer that his nephrologist manage BP and taking BP in the comfort of his home. Discussed hypertensive symptoms and to follow-up sooner if they develop  Discussed diet, exercise and lifestyle modifications. 3. Dyslipidemia  Well controlled. Continue with fenofibrate. Within goal. LDL <100  Discussed diet, exercise and lifestyle modifications. 4. Type II diabetes mellitus with nephropathy ([address]  Diet controlled. Continue with great diet, exercise and lifestyle modifications. Continue with regular feet checks. Need for regular eye exams. 5. CKD stage G3b/A1, GFR 30-44 and albumin creatinine ratio <30 mg/g (HCC)  Continue management with Dr. Yaneth Skaggs   Discussed being careful with overuse of NSAIDs. 6. Gout, unspecified cause, unspecified chronicity, unspecified site  Well controlled on allopurinol. No recent flare-up     7. Obstructive sleep apnea syndrome  Never been tested for BETSY. Patient will let me know if he is interested in being tested in the future. 8. History of prostate cancer  Continue management with Dr. Ashvin Herrera. Follow-up and Dispositions    Return in about 6 months (around 10/27/2023), or if symptoms worsen or fail to improve, for chronic illnesses. .          Medication Side Effects and Warnings were discussed with patient: yes  Patient Labs were reviewed: yes  Patient Past Records were reviewed:  yes    Verbal and written instructions (see AVS) provided. Patient expresses understanding and agreement of diagnosis and treatment plan.     Aguilar Chan NP

## 2023-04-27 ENCOUNTER — OFFICE VISIT (OUTPATIENT)
Dept: FAMILY MEDICINE CLINIC | Age: 73
End: 2023-04-27
Payer: MEDICARE

## 2023-04-27 VITALS
HEIGHT: 66 IN | TEMPERATURE: 98 F | HEART RATE: 68 BPM | BODY MASS INDEX: 25.88 KG/M2 | RESPIRATION RATE: 17 BRPM | SYSTOLIC BLOOD PRESSURE: 154 MMHG | WEIGHT: 161 LBS | DIASTOLIC BLOOD PRESSURE: 70 MMHG | OXYGEN SATURATION: 98 %

## 2023-04-27 DIAGNOSIS — M10.9 GOUT, UNSPECIFIED CAUSE, UNSPECIFIED CHRONICITY, UNSPECIFIED SITE: ICD-10-CM

## 2023-04-27 DIAGNOSIS — E78.5 DYSLIPIDEMIA: Chronic | ICD-10-CM

## 2023-04-27 DIAGNOSIS — I10 PRIMARY HYPERTENSION: Chronic | ICD-10-CM

## 2023-04-27 DIAGNOSIS — G47.33 OBSTRUCTIVE SLEEP APNEA SYNDROME: ICD-10-CM

## 2023-04-27 DIAGNOSIS — N18.32 CKD STAGE G3B/A1, GFR 30-44 AND ALBUMIN CREATININE RATIO <30 MG/G (HCC): ICD-10-CM

## 2023-04-27 DIAGNOSIS — Z00.00 ENCOUNTER FOR MEDICAL EXAMINATION TO ESTABLISH CARE: Primary | ICD-10-CM

## 2023-04-27 DIAGNOSIS — E11.21 TYPE II DIABETES MELLITUS WITH NEPHROPATHY (HCC): Chronic | ICD-10-CM

## 2023-04-27 DIAGNOSIS — Z85.46 HISTORY OF PROSTATE CANCER: ICD-10-CM

## 2023-04-27 PROCEDURE — 3044F HG A1C LEVEL LT 7.0%: CPT

## 2023-04-27 PROCEDURE — 2022F DILAT RTA XM EVC RTNOPTHY: CPT

## 2023-04-27 PROCEDURE — G0463 HOSPITAL OUTPT CLINIC VISIT: HCPCS

## 2023-04-27 PROCEDURE — G8536 NO DOC ELDER MAL SCRN: HCPCS

## 2023-04-27 PROCEDURE — 99204 OFFICE O/P NEW MOD 45 MIN: CPT

## 2023-04-27 PROCEDURE — 3017F COLORECTAL CA SCREEN DOC REV: CPT

## 2023-04-27 PROCEDURE — 3078F DIAST BP <80 MM HG: CPT

## 2023-04-27 PROCEDURE — G8417 CALC BMI ABV UP PARAM F/U: HCPCS

## 2023-04-27 PROCEDURE — G8432 DEP SCR NOT DOC, RNG: HCPCS

## 2023-04-27 PROCEDURE — 3077F SYST BP >= 140 MM HG: CPT

## 2023-04-27 PROCEDURE — 1101F PT FALLS ASSESS-DOCD LE1/YR: CPT

## 2023-04-27 PROCEDURE — 1123F ACP DISCUSS/DSCN MKR DOCD: CPT

## 2023-04-27 PROCEDURE — G8427 DOCREV CUR MEDS BY ELIG CLIN: HCPCS

## 2023-04-27 RX ORDER — GUAIFENESIN 100 MG/5ML
81 LIQUID (ML) ORAL DAILY
COMMUNITY

## 2023-04-27 NOTE — PATIENT INSTRUCTIONS
DASH Diet: Care Instructions  Your Care Instructions     The DASH diet is an eating plan that can help lower your blood pressure. DASH stands for Dietary Approaches to Stop Hypertension. Hypertension is high blood pressure. The DASH diet focuses on eating foods that are high in calcium, potassium, and magnesium. These nutrients can lower blood pressure. The foods that are highest in these nutrients are fruits, vegetables, low-fat dairy products, nuts, seeds, and legumes. But taking calcium, potassium, and magnesium supplements instead of eating foods that are high in those nutrients does not have the same effect. The DASH diet also includes whole grains, fish, and poultry. The DASH diet is one of several lifestyle changes your doctor may recommend to lower your high blood pressure. Your doctor may also want you to decrease the amount of sodium in your diet. Lowering sodium while following the DASH diet can lower blood pressure even further than just the DASH diet alone. Follow-up care is a key part of your treatment and safety. Be sure to make and go to all appointments, and call your doctor if you are having problems. It's also a good idea to know your test results and keep a list of the medicines you take. How can you care for yourself at home? Following the DASH diet  Eat 4 to 5 servings of fruit each day. A serving is 1 medium-sized piece of fruit, ½ cup chopped or canned fruit, 1/4 cup dried fruit, or 4 ounces (½ cup) of fruit juice. Choose fruit more often than fruit juice. Eat 4 to 5 servings of vegetables each day. A serving is 1 cup of lettuce or raw leafy vegetables, ½ cup of chopped or cooked vegetables, or 4 ounces (½ cup) of vegetable juice. Choose vegetables more often than vegetable juice. Get 2 to 3 servings of low-fat and fat-free dairy each day. A serving is 8 ounces of milk, 1 cup of yogurt, or 1 ½ ounces of cheese. Eat 6 to 8 servings of grains each day.  A serving is 1 slice of bread, 1 ounce of dry cereal, or ½ cup of cooked rice, pasta, or cooked cereal. Try to choose whole-grain products as much as possible. Limit lean meat, poultry, and fish to 2 servings each day. A serving is 3 ounces, about the size of a deck of cards. Eat 4 to 5 servings of nuts, seeds, and legumes (cooked dried beans, lentils, and split peas) each week. A serving is 1/3 cup of nuts, 2 tablespoons of seeds, or ½ cup of cooked beans or peas. Limit fats and oils to 2 to 3 servings each day. A serving is 1 teaspoon of vegetable oil or 2 tablespoons of salad dressing. Limit sweets and added sugars to 5 servings or less a week. A serving is 1 tablespoon jelly or jam, ½ cup sorbet, or 1 cup of lemonade. Eat less than 2,300 milligrams (mg) of sodium a day. If you limit your sodium to 1,500 mg a day, you can lower your blood pressure even more. Be aware that all of these are the suggested number of servings for people who eat 1,800 to 2,000 calories a day. Your recommended number of servings may be different if you need more or fewer calories. Tips for success  Start small. Do not try to make dramatic changes to your diet all at once. You might feel that you are missing out on your favorite foods and then be more likely to not follow the plan. Make small changes, and stick with them. Once those changes become habit, add a few more changes. Try some of the following:  Make it a goal to eat a fruit or vegetable at every meal and at snacks. This will make it easy to get the recommended amount of fruits and vegetables each day. Try yogurt topped with fruit and nuts for a snack or healthy dessert. Add lettuce, tomato, cucumber, and onion to sandwiches. Combine a ready-made pizza crust with low-fat mozzarella cheese and lots of vegetable toppings. Try using tomatoes, squash, spinach, broccoli, carrots, cauliflower, and onions.   Have a variety of cut-up vegetables with a low-fat dip as an appetizer instead of chips and dip.  Sprinkle sunflower seeds or chopped almonds over salads. Or try adding chopped walnuts or almonds to cooked vegetables. Try some vegetarian meals using beans and peas. Add garbanzo or kidney beans to salads. Make burritos and tacos with mashed esquivel beans or black beans. Where can you learn more? Go to http://www.pel[employer]ier.com/  Enter H967 in the search box to learn more about \"DASH Diet: Care Instructions. \"  Current as of: May 9, 2022               Content Version: 13.6  © 2928-1991 [employer]. Care instructions adapted under license by [employer] (which disclaims liability or warranty for this information). If you have questions about a medical condition or this instruction, always ask your healthcare professional. [address] any warranty or liability for your use of this information.

## 2023-04-27 NOTE — PROGRESS NOTES
Chief Complaint   Patient presents with    [address] Maintenance reviewed     1. Have you been to the ER, urgent care clinic since your last visit? Hospitalized since your last visit? No     2. Have you seen or consulted any other health care providers outside of the [address] [place] [address] since your last visit? Include any pap smears or colon screening.   No

## 2023-08-24 ENCOUNTER — TELEPHONE (OUTPATIENT)
Age: 73
End: 2023-08-24

## 2023-08-24 NOTE — TELEPHONE ENCOUNTER
----- Message from Baudilio Mayen sent at 8/21/2023 12:05 PM EDT -----  Subject: Message to Provider    QUESTIONS  Information for Provider? Patient has a 6 month follow up appt with   Chip Seat scheduled . for 10/27. He is requesting an early morning   appointment for 10/27 or any other day. Can you please contact patient   with scheduling information?  ---------------------------------------------------------------------------  --------------  [address]  [phone]; OK to leave message on voicemail  ---------------------------------------------------------------------------  --------------  SCRIPT ANSWERS  Relationship to Patient?  Self

## 2023-09-20 RX ORDER — LISINOPRIL 2.5 MG/1
TABLET ORAL
Qty: 90 TABLET | Refills: 3 | Status: SHIPPED | OUTPATIENT
Start: 2023-09-20

## 2023-09-20 RX ORDER — FENOFIBRATE 160 MG/1
TABLET ORAL
Qty: 90 TABLET | Refills: 3 | Status: SHIPPED | OUTPATIENT
Start: 2023-09-20

## 2023-09-20 RX ORDER — ALLOPURINOL 100 MG/1
TABLET ORAL
Qty: 180 TABLET | Refills: 3 | Status: SHIPPED | OUTPATIENT
Start: 2023-09-20

## 2023-10-27 ENCOUNTER — OFFICE VISIT (OUTPATIENT)
Age: 73
End: 2023-10-27
Payer: MEDICARE

## 2023-10-27 VITALS
SYSTOLIC BLOOD PRESSURE: 136 MMHG | HEART RATE: 71 BPM | BODY MASS INDEX: 25.91 KG/M2 | WEIGHT: 161.2 LBS | RESPIRATION RATE: 17 BRPM | OXYGEN SATURATION: 98 % | HEIGHT: 66 IN | DIASTOLIC BLOOD PRESSURE: 73 MMHG | TEMPERATURE: 98.5 F

## 2023-10-27 DIAGNOSIS — Z00.00 ROUTINE GENERAL MEDICAL EXAMINATION AT A HEALTH CARE FACILITY: Primary | ICD-10-CM

## 2023-10-27 DIAGNOSIS — Z12.11 COLON CANCER SCREENING: ICD-10-CM

## 2023-10-27 DIAGNOSIS — Z23 NEED FOR PROPHYLACTIC VACCINATION AGAINST STREPTOCOCCUS PNEUMONIAE (PNEUMOCOCCUS): ICD-10-CM

## 2023-10-27 DIAGNOSIS — E11.21 TYPE 2 DIABETES MELLITUS WITH DIABETIC NEPHROPATHY, WITHOUT LONG-TERM CURRENT USE OF INSULIN (HCC): ICD-10-CM

## 2023-10-27 DIAGNOSIS — E11.21 TYPE II DIABETES MELLITUS WITH NEPHROPATHY (HCC): ICD-10-CM

## 2023-10-27 DIAGNOSIS — E55.9 VITAMIN D DEFICIENCY: ICD-10-CM

## 2023-10-27 DIAGNOSIS — Z23 NEEDS FLU SHOT: ICD-10-CM

## 2023-10-27 DIAGNOSIS — E53.8 B12 DEFICIENCY: ICD-10-CM

## 2023-10-27 DIAGNOSIS — I10 PRIMARY HYPERTENSION: ICD-10-CM

## 2023-10-27 LAB — HBA1C MFR BLD: 5.6 %

## 2023-10-27 PROCEDURE — 90677 PCV20 VACCINE IM: CPT | Performed by: FAMILY MEDICINE

## 2023-10-27 PROCEDURE — 90694 VACC AIIV4 NO PRSRV 0.5ML IM: CPT | Performed by: FAMILY MEDICINE

## 2023-10-27 PROCEDURE — 83036 HEMOGLOBIN GLYCOSYLATED A1C: CPT | Performed by: FAMILY MEDICINE

## 2023-10-27 PROCEDURE — 99214 OFFICE O/P EST MOD 30 MIN: CPT | Performed by: FAMILY MEDICINE

## 2023-10-27 RX ORDER — ASPIRIN 81 MG/1
81 TABLET ORAL DAILY
COMMUNITY

## 2023-10-27 SDOH — ECONOMIC STABILITY: INCOME INSECURITY: HOW HARD IS IT FOR YOU TO PAY FOR THE VERY BASICS LIKE FOOD, HOUSING, MEDICAL CARE, AND HEATING?: NOT HARD AT ALL

## 2023-10-27 SDOH — ECONOMIC STABILITY: FOOD INSECURITY: WITHIN THE PAST 12 MONTHS, THE FOOD YOU BOUGHT JUST DIDN'T LAST AND YOU DIDN'T HAVE MONEY TO GET MORE.: NEVER TRUE

## 2023-10-27 SDOH — ECONOMIC STABILITY: HOUSING INSECURITY
IN THE LAST 12 MONTHS, WAS THERE A TIME WHEN YOU DID NOT HAVE A STEADY PLACE TO SLEEP OR SLEPT IN A SHELTER (INCLUDING NOW)?: NO

## 2023-10-27 SDOH — ECONOMIC STABILITY: FOOD INSECURITY: WITHIN THE PAST 12 MONTHS, YOU WORRIED THAT YOUR FOOD WOULD RUN OUT BEFORE YOU GOT MONEY TO BUY MORE.: NEVER TRUE

## 2023-10-28 LAB
25(OH)D3 SERPL-MCNC: 46.5 NG/ML (ref 30–100)
ALBUMIN SERPL-MCNC: 4.1 G/DL (ref 3.5–5)
ALBUMIN/GLOB SERPL: 1.2 (ref 1.1–2.2)
ALP SERPL-CCNC: 45 U/L (ref 45–117)
ALT SERPL-CCNC: 29 U/L (ref 12–78)
ANION GAP SERPL CALC-SCNC: 3 MMOL/L (ref 5–15)
AST SERPL-CCNC: 25 U/L (ref 15–37)
BASOPHILS # BLD: 0 K/UL (ref 0–0.1)
BASOPHILS NFR BLD: 1 % (ref 0–1)
BILIRUB SERPL-MCNC: 1 MG/DL (ref 0.2–1)
BUN SERPL-MCNC: 53 MG/DL (ref 6–20)
BUN/CREAT SERPL: 25 (ref 12–20)
CALCIUM SERPL-MCNC: 9.5 MG/DL (ref 8.5–10.1)
CHLORIDE SERPL-SCNC: 108 MMOL/L (ref 97–108)
CHOLEST SERPL-MCNC: 157 MG/DL
CO2 SERPL-SCNC: 27 MMOL/L (ref 21–32)
CREAT SERPL-MCNC: 2.16 MG/DL (ref 0.7–1.3)
DIFFERENTIAL METHOD BLD: ABNORMAL
EOSINOPHIL # BLD: 0.5 K/UL (ref 0–0.4)
EOSINOPHIL NFR BLD: 10 % (ref 0–7)
ERYTHROCYTE [DISTWIDTH] IN BLOOD BY AUTOMATED COUNT: 13.7 % (ref 11.5–14.5)
FOLATE SERPL-MCNC: 15.3 NG/ML (ref 5–21)
GLOBULIN SER CALC-MCNC: 3.3 G/DL (ref 2–4)
GLUCOSE SERPL-MCNC: 110 MG/DL (ref 65–100)
HCT VFR BLD AUTO: 42.8 % (ref 36.6–50.3)
HDLC SERPL-MCNC: 38 MG/DL
HDLC SERPL: 4.1 (ref 0–5)
HGB BLD-MCNC: 14 G/DL (ref 12.1–17)
IMM GRANULOCYTES # BLD AUTO: 0 K/UL (ref 0–0.04)
IMM GRANULOCYTES NFR BLD AUTO: 0 % (ref 0–0.5)
LDLC SERPL CALC-MCNC: 97.6 MG/DL (ref 0–100)
LYMPHOCYTES # BLD: 1.5 K/UL (ref 0.8–3.5)
LYMPHOCYTES NFR BLD: 31 % (ref 12–49)
MCH RBC QN AUTO: 33.1 PG (ref 26–34)
MCHC RBC AUTO-ENTMCNC: 32.7 G/DL (ref 30–36.5)
MCV RBC AUTO: 101.2 FL (ref 80–99)
MONOCYTES # BLD: 0.7 K/UL (ref 0–1)
MONOCYTES NFR BLD: 14 % (ref 5–13)
NEUTS SEG # BLD: 2.2 K/UL (ref 1.8–8)
NEUTS SEG NFR BLD: 44 % (ref 32–75)
NRBC # BLD: 0 K/UL (ref 0–0.01)
NRBC BLD-RTO: 0 PER 100 WBC
PLATELET # BLD AUTO: 161 K/UL (ref 150–400)
PMV BLD AUTO: 12.1 FL (ref 8.9–12.9)
POTASSIUM SERPL-SCNC: 4.8 MMOL/L (ref 3.5–5.1)
PROT SERPL-MCNC: 7.4 G/DL (ref 6.4–8.2)
RBC # BLD AUTO: 4.23 M/UL (ref 4.1–5.7)
SODIUM SERPL-SCNC: 138 MMOL/L (ref 136–145)
TRIGL SERPL-MCNC: 107 MG/DL
TSH SERPL DL<=0.05 MIU/L-ACNC: 1.63 UIU/ML (ref 0.36–3.74)
VIT B12 SERPL-MCNC: 561 PG/ML (ref 193–986)
VLDLC SERPL CALC-MCNC: 21.4 MG/DL
WBC # BLD AUTO: 4.9 K/UL (ref 4.1–11.1)

## 2023-11-07 LAB — HEMOCCULT STL QL IA: NEGATIVE

## 2024-04-10 ENCOUNTER — OFFICE VISIT (OUTPATIENT)
Age: 74
End: 2024-04-10
Payer: MEDICARE

## 2024-04-10 VITALS
RESPIRATION RATE: 16 BRPM | BODY MASS INDEX: 25.97 KG/M2 | OXYGEN SATURATION: 96 % | HEIGHT: 66 IN | TEMPERATURE: 98.9 F | DIASTOLIC BLOOD PRESSURE: 77 MMHG | WEIGHT: 161.6 LBS | HEART RATE: 76 BPM | SYSTOLIC BLOOD PRESSURE: 136 MMHG

## 2024-04-10 DIAGNOSIS — N18.30 STAGE 3 CHRONIC KIDNEY DISEASE, UNSPECIFIED WHETHER STAGE 3A OR 3B CKD (HCC): ICD-10-CM

## 2024-04-10 DIAGNOSIS — N18.32 CHRONIC KIDNEY DISEASE, STAGE 3B (HCC): ICD-10-CM

## 2024-04-10 DIAGNOSIS — R09.89 CHEST CONGESTION: Primary | ICD-10-CM

## 2024-04-10 DIAGNOSIS — R05.1 ACUTE COUGH: ICD-10-CM

## 2024-04-10 DIAGNOSIS — Z20.828 EXPOSURE TO INFLUENZA: ICD-10-CM

## 2024-04-10 DIAGNOSIS — E11.21 TYPE 2 DIABETES MELLITUS WITH DIABETIC NEPHROPATHY, WITHOUT LONG-TERM CURRENT USE OF INSULIN (HCC): ICD-10-CM

## 2024-04-10 LAB
HBA1C MFR BLD: 5.7 %
INFLUENZA A ANTIGEN, POC: NEGATIVE
INFLUENZA B ANTIGEN, POC: NEGATIVE
VALID INTERNAL CONTROL, POC: YES

## 2024-04-10 PROCEDURE — 87804 INFLUENZA ASSAY W/OPTIC: CPT | Performed by: FAMILY MEDICINE

## 2024-04-10 PROCEDURE — 83036 HEMOGLOBIN GLYCOSYLATED A1C: CPT | Performed by: FAMILY MEDICINE

## 2024-04-10 PROCEDURE — 99214 OFFICE O/P EST MOD 30 MIN: CPT | Performed by: FAMILY MEDICINE

## 2024-04-10 RX ORDER — BENZONATATE 200 MG/1
200 CAPSULE ORAL 3 TIMES DAILY PRN
Qty: 30 CAPSULE | Refills: 0 | Status: SHIPPED | OUTPATIENT
Start: 2024-04-10 | End: 2024-04-20

## 2024-04-10 ASSESSMENT — PATIENT HEALTH QUESTIONNAIRE - PHQ9
SUM OF ALL RESPONSES TO PHQ9 QUESTIONS 1 & 2: 0
SUM OF ALL RESPONSES TO PHQ QUESTIONS 1-9: 0
2. FEELING DOWN, DEPRESSED OR HOPELESS: NOT AT ALL
SUM OF ALL RESPONSES TO PHQ QUESTIONS 1-9: 0
1. LITTLE INTEREST OR PLEASURE IN DOING THINGS: NOT AT ALL

## 2024-04-10 NOTE — PROGRESS NOTES
Chief Complaint   Patient presents with    6 Month Follow-Up    Chest Congestion     Wife has the flu per pt         Health Maintenance Due   Topic Date Due    DTaP/Tdap/Td vaccine (1 - Tdap) Never done    Shingles vaccine (1 of 2) Never done    Respiratory Syncytial Virus (RSV) Pregnant or age 60 yrs+ (1 - 1-dose 60+ series) Never done    COVID-19 Vaccine (3 - 2023-24 season) 09/01/2023    Depression Screen  04/27/2024         \"Have you been to the ER, urgent care clinic since your last visit?  Hospitalized since your last visit?\"    NO    “Have you seen or consulted any other health care providers outside of [facility] since your last visit?”    Nephrology Dr IMANI Bustos             
Flonase, fluids  Also happens to be high pollen levels today.  This bothered him a little bit last year but he does not typically have allergies.  Consider taking Allegra if your symptoms are persisting for another week    Diabetes  A1c 5.7 stable from from 5.6  Diet controlled    Hypertension  Care of nephrology  Well-controlled.  Using a home monitor that transmits directly to nephrology  Lisinopril 2.5     CKD 3  GFR around 30  Care of nephrology  Theorized to be due to hypertension  Lisinopril 2.5  Allopurinol 100  Potassium (apparently helping with kidney stones)     Gout  No flare since allopurinol 100.     History prostate cancer  Prostatectomy  Annual follow-up with urology Dr. Gauthier.     Recommend 6-month follow-up      ICD-10-CM    1. Chest congestion  R09.89 AMB POC POLI INFLUENZA A/B TEST      2. Acute cough  R05.1 AMB POC POLI INFLUENZA A/B TEST      3. Exposure to influenza  Z20.828 AMB POC POLI INFLUENZA A/B TEST      4. Type 2 diabetes mellitus with diabetic nephropathy, without long-term current use of insulin (HCC)  E11.21       5. Stage 3 chronic kidney disease, unspecified whether stage 3a or 3b CKD (HCC)  N18.30       6. Chronic kidney disease, stage 3b (HCC)  N18.32             AVS given. Pt expressed understanding of instructions

## 2024-09-10 RX ORDER — FENOFIBRATE 160 MG/1
160 TABLET ORAL DAILY
Qty: 90 TABLET | Refills: 3 | Status: SHIPPED | OUTPATIENT
Start: 2024-09-10

## 2024-09-10 RX ORDER — ALLOPURINOL 100 MG/1
200 TABLET ORAL DAILY
Qty: 180 TABLET | Refills: 3 | Status: SHIPPED | OUTPATIENT
Start: 2024-09-10

## 2024-09-10 RX ORDER — LISINOPRIL 2.5 MG/1
2.5 TABLET ORAL DAILY
Qty: 90 TABLET | Refills: 3 | Status: SHIPPED | OUTPATIENT
Start: 2024-09-10

## 2024-10-11 ENCOUNTER — OFFICE VISIT (OUTPATIENT)
Age: 74
End: 2024-10-11
Payer: MEDICARE

## 2024-10-11 ENCOUNTER — CLINICAL DOCUMENTATION (OUTPATIENT)
Age: 74
End: 2024-10-11

## 2024-10-11 VITALS
DIASTOLIC BLOOD PRESSURE: 82 MMHG | RESPIRATION RATE: 16 BRPM | HEART RATE: 78 BPM | WEIGHT: 160.8 LBS | TEMPERATURE: 98.2 F | SYSTOLIC BLOOD PRESSURE: 146 MMHG | HEIGHT: 66 IN | BODY MASS INDEX: 25.84 KG/M2 | OXYGEN SATURATION: 98 %

## 2024-10-11 DIAGNOSIS — E53.8 B12 DEFICIENCY: ICD-10-CM

## 2024-10-11 DIAGNOSIS — I10 PRIMARY HYPERTENSION: ICD-10-CM

## 2024-10-11 DIAGNOSIS — R01.1 HEART MURMUR: ICD-10-CM

## 2024-10-11 DIAGNOSIS — M10.9 ACUTE GOUT, UNSPECIFIED CAUSE, UNSPECIFIED SITE: ICD-10-CM

## 2024-10-11 DIAGNOSIS — E55.9 VITAMIN D DEFICIENCY: ICD-10-CM

## 2024-10-11 DIAGNOSIS — Z23 ENCOUNTER FOR IMMUNIZATION: ICD-10-CM

## 2024-10-11 DIAGNOSIS — E11.21 TYPE 2 DIABETES MELLITUS WITH DIABETIC NEPHROPATHY, WITHOUT LONG-TERM CURRENT USE OF INSULIN (HCC): ICD-10-CM

## 2024-10-11 DIAGNOSIS — Z00.00 ROUTINE GENERAL MEDICAL EXAMINATION AT A HEALTH CARE FACILITY: Primary | ICD-10-CM

## 2024-10-11 DIAGNOSIS — N18.32 CHRONIC KIDNEY DISEASE, STAGE 3B (HCC): ICD-10-CM

## 2024-10-11 DIAGNOSIS — R01.1 MURMUR: ICD-10-CM

## 2024-10-11 PROCEDURE — 99214 OFFICE O/P EST MOD 30 MIN: CPT | Performed by: FAMILY MEDICINE

## 2024-10-11 PROCEDURE — 90653 IIV ADJUVANT VACCINE IM: CPT | Performed by: FAMILY MEDICINE

## 2024-10-11 SDOH — ECONOMIC STABILITY: FOOD INSECURITY: WITHIN THE PAST 12 MONTHS, YOU WORRIED THAT YOUR FOOD WOULD RUN OUT BEFORE YOU GOT MONEY TO BUY MORE.: NEVER TRUE

## 2024-10-11 SDOH — ECONOMIC STABILITY: INCOME INSECURITY: HOW HARD IS IT FOR YOU TO PAY FOR THE VERY BASICS LIKE FOOD, HOUSING, MEDICAL CARE, AND HEATING?: NOT HARD AT ALL

## 2024-10-11 SDOH — ECONOMIC STABILITY: FOOD INSECURITY: WITHIN THE PAST 12 MONTHS, THE FOOD YOU BOUGHT JUST DIDN'T LAST AND YOU DIDN'T HAVE MONEY TO GET MORE.: NEVER TRUE

## 2024-10-11 ASSESSMENT — PATIENT HEALTH QUESTIONNAIRE - PHQ9
SUM OF ALL RESPONSES TO PHQ QUESTIONS 1-9: 0
SUM OF ALL RESPONSES TO PHQ QUESTIONS 1-9: 0
2. FEELING DOWN, DEPRESSED OR HOPELESS: NOT AT ALL
SUM OF ALL RESPONSES TO PHQ9 QUESTIONS 1 & 2: 0
SUM OF ALL RESPONSES TO PHQ QUESTIONS 1-9: 0
1. LITTLE INTEREST OR PLEASURE IN DOING THINGS: NOT AT ALL
SUM OF ALL RESPONSES TO PHQ QUESTIONS 1-9: 0

## 2024-10-11 ASSESSMENT — LIFESTYLE VARIABLES: HOW MANY STANDARD DRINKS CONTAINING ALCOHOL DO YOU HAVE ON A TYPICAL DAY: PATIENT DOES NOT DRINK

## 2024-10-11 NOTE — PROGRESS NOTES
Chief Complaint   Patient presents with    Medicare AWV         Health Maintenance Due   Topic Date Due    DTaP/Tdap/Td vaccine (1 - Tdap) Never done    Shingles vaccine (1 of 2) Never done    Respiratory Syncytial Virus (RSV) Pregnant or age 60 yrs+ (1 - 1-dose 60+ series) Never done    Flu vaccine (1) 08/01/2024    COVID-19 Vaccine (3 - 2023-24 season) 09/01/2024         \"Have you been to the ER, urgent care clinic since your last visit?  Hospitalized since your last visit?\"    NO    “Have you seen or consulted any other health care providers outside of [facility] since your last visit?”    YES - When: approximately Yes ago.  Where and Why: Ophthalmology.

## 2024-10-11 NOTE — PROGRESS NOTES
Medicare Annual Wellness Visit     I have reviewed the patient's medical history in detail and updated the computerized patient record.     History   Maycol Naqvi Jr. is a 74 y.o. male who presents to follow-up on chronic medical issues.  Established with STACIE Rosales 4/23.  Coming from primary care [place]    Diabetes is diet controlled    Remote history of prostate cancer 12 years ago.  Prostatectomy.  Sees urologist once a year.    [facility]    Checks his blood pressure at home.  Monitor is through his nephrologist    Past Medical History:   Diagnosis Date    Atherosclerotic vascular disease 05/2018    CXR Finding - Aorta    CKD stage G3b/A1, GFR 30-44 and albumin creatinine ratio <30 mg/g (HCC)     Dyslipidemia     History of echocardiogram 05/2018    Left ventricle: Systolic function was normal. Ejection fraction was estimated in the range of 60 % to 65 %. There were no regional wall motion abnormalities.    History of kidney stones 01/2012    CT Finding    History of pericarditis 05/23/2018    idiopathic    Hypertension     Idiopathic chronic gout     gout    Obstructive sleep apnea syndrome     septum surgery to fix, still an issue    Type II diabetes mellitus with nephropathy (HCC) 09/18/2019      Past Surgical History:   Procedure Laterality Date    KNEE ARTHROSCOPY      OTHER SURGICAL HISTORY      kidney stones     Current Outpatient Medications   Medication Sig Dispense Refill    allopurinol (ZYLOPRIM) 100 MG tablet Take 2 tablets by mouth daily 180 tablet 3    lisinopril (PRINIVIL;ZESTRIL) 2.5 MG tablet Take 1 tablet by mouth daily 90 tablet 3    fenofibrate (TRIGLIDE) 160 MG tablet Take 1 tablet by mouth daily 90 tablet 3    aspirin 81 MG EC tablet Take 1 tablet by mouth daily      ascorbic acid (VITAMIN C) 500 MG tablet Take 1 tablet by mouth Daily with lunch      vitamin D (CHOLECALCIFEROL) 25 MCG (1000 UT) TABS tablet Take 0.5 tablets by mouth 2 times daily

## 2024-10-12 LAB
25(OH)D3 SERPL-MCNC: 40.4 NG/ML (ref 30–100)
ALBUMIN SERPL-MCNC: 4.1 G/DL (ref 3.5–5)
ALBUMIN/GLOB SERPL: 1.2 (ref 1.1–2.2)
ALP SERPL-CCNC: 43 U/L (ref 45–117)
ALT SERPL-CCNC: 28 U/L (ref 12–78)
ANION GAP SERPL CALC-SCNC: 2 MMOL/L (ref 2–12)
AST SERPL-CCNC: 24 U/L (ref 15–37)
BASOPHILS # BLD: 0.1 K/UL (ref 0–0.1)
BASOPHILS NFR BLD: 1 % (ref 0–1)
BILIRUB SERPL-MCNC: 1 MG/DL (ref 0.2–1)
BUN SERPL-MCNC: 53 MG/DL (ref 6–20)
BUN/CREAT SERPL: 22 (ref 12–20)
CALCIUM SERPL-MCNC: 9.6 MG/DL (ref 8.5–10.1)
CHLORIDE SERPL-SCNC: 111 MMOL/L (ref 97–108)
CHOLEST SERPL-MCNC: 174 MG/DL
CO2 SERPL-SCNC: 25 MMOL/L (ref 21–32)
CREAT SERPL-MCNC: 2.39 MG/DL (ref 0.7–1.3)
DIFFERENTIAL METHOD BLD: ABNORMAL
EOSINOPHIL # BLD: 0.4 K/UL (ref 0–0.4)
EOSINOPHIL NFR BLD: 9 % (ref 0–7)
ERYTHROCYTE [DISTWIDTH] IN BLOOD BY AUTOMATED COUNT: 14 % (ref 11.5–14.5)
EST. AVERAGE GLUCOSE BLD GHB EST-MCNC: 117 MG/DL
FOLATE SERPL-MCNC: 14.5 NG/ML (ref 5–21)
GLOBULIN SER CALC-MCNC: 3.3 G/DL (ref 2–4)
GLUCOSE SERPL-MCNC: 117 MG/DL (ref 65–100)
HBA1C MFR BLD: 5.7 % (ref 4–5.6)
HCT VFR BLD AUTO: 42.5 % (ref 36.6–50.3)
HDLC SERPL-MCNC: 38 MG/DL
HDLC SERPL: 4.6 (ref 0–5)
HGB BLD-MCNC: 13.9 G/DL (ref 12.1–17)
IMM GRANULOCYTES # BLD AUTO: 0 K/UL (ref 0–0.04)
IMM GRANULOCYTES NFR BLD AUTO: 0 % (ref 0–0.5)
LDLC SERPL CALC-MCNC: 118.4 MG/DL (ref 0–100)
LYMPHOCYTES # BLD: 1.5 K/UL (ref 0.8–3.5)
LYMPHOCYTES NFR BLD: 32 % (ref 12–49)
MCH RBC QN AUTO: 33.4 PG (ref 26–34)
MCHC RBC AUTO-ENTMCNC: 32.7 G/DL (ref 30–36.5)
MCV RBC AUTO: 102.2 FL (ref 80–99)
MONOCYTES # BLD: 0.6 K/UL (ref 0–1)
MONOCYTES NFR BLD: 13 % (ref 5–13)
NEUTS SEG # BLD: 2 K/UL (ref 1.8–8)
NEUTS SEG NFR BLD: 45 % (ref 32–75)
NRBC # BLD: 0 K/UL (ref 0–0.01)
NRBC BLD-RTO: 0 PER 100 WBC
PLATELET # BLD AUTO: 152 K/UL (ref 150–400)
PMV BLD AUTO: 12.1 FL (ref 8.9–12.9)
POTASSIUM SERPL-SCNC: 5 MMOL/L (ref 3.5–5.1)
PROT SERPL-MCNC: 7.4 G/DL (ref 6.4–8.2)
RBC # BLD AUTO: 4.16 M/UL (ref 4.1–5.7)
SODIUM SERPL-SCNC: 138 MMOL/L (ref 136–145)
TRIGL SERPL-MCNC: 88 MG/DL
URATE SERPL-MCNC: 5.6 MG/DL (ref 3.5–7.2)
VIT B12 SERPL-MCNC: 507 PG/ML (ref 193–986)
VLDLC SERPL CALC-MCNC: 17.6 MG/DL
WBC # BLD AUTO: 4.6 K/UL (ref 4.1–11.1)

## 2024-10-14 ENCOUNTER — TELEPHONE (OUTPATIENT)
Age: 74
End: 2024-10-14

## 2024-10-14 RX ORDER — ATORVASTATIN CALCIUM 10 MG/1
10 TABLET, FILM COATED ORAL DAILY
Qty: 90 TABLET | Refills: 3 | Status: CANCELLED | OUTPATIENT
Start: 2024-10-14

## 2024-10-14 NOTE — TELEPHONE ENCOUNTER
Labs reviewed.    Cholesterol is elevated.  Should be taking a statin to reduce chance of heart attack and stroke.  He used to take atorvastatin but it appears to fallen off his list.  Refill pended to encounter if needed

## 2024-10-15 ENCOUNTER — TELEPHONE (OUTPATIENT)
Age: 74
End: 2024-10-15

## 2024-10-15 NOTE — TELEPHONE ENCOUNTER
verified. Informed pt of message from provider regarding lab results. Pt verified understanding, pt states since he is on the fenofibrate he is going to just try to work on diet and exercise and will recheck at his 6 month follow up before starting a statin. Pt had no further questions.

## 2024-10-15 NOTE — TELEPHONE ENCOUNTER
Pt is returning Caitie's call concerning provider message. Pt was informed to check his MyChart    Please call to discuss

## 2024-10-15 NOTE — TELEPHONE ENCOUNTER
Unable to reach pt to inform of lab results. Unable to leave voicemail (message does not cut off to leave voicemail). Sent results through my chart as well.

## 2024-12-03 ENCOUNTER — HOSPITAL ENCOUNTER (OUTPATIENT)
Facility: HOSPITAL | Age: 74
Discharge: HOME | End: 2024-12-05
Payer: MEDICARE

## 2024-12-03 DIAGNOSIS — R01.1 MURMUR: ICD-10-CM

## 2024-12-03 DIAGNOSIS — I10 PRIMARY HYPERTENSION: ICD-10-CM

## 2024-12-03 DIAGNOSIS — R01.1 HEART MURMUR: ICD-10-CM

## 2024-12-03 LAB
ECHO AO ASC DIAM: 3.8 CM
ECHO AO ROOT DIAM: 3.4 CM
ECHO AR MAX VEL PISA: 3.9 M/S
ECHO AV AREA PEAK VELOCITY: 0.6 CM2
ECHO AV AREA PEAK VELOCITY: 0.7 CM2
ECHO AV AREA VTI: 0.6 CM2
ECHO AV MEAN GRADIENT: 41 MMHG
ECHO AV MEAN VELOCITY: 2.7 M/S
ECHO AV PEAK GRADIENT: 42 MMHG
ECHO AV PEAK GRADIENT: 54 MMHG
ECHO AV PEAK VELOCITY: 3.2 M/S
ECHO AV PEAK VELOCITY: 3.7 M/S
ECHO AV REGURGITANT PHT: 470.1 MILLISECOND
ECHO AV VTI: 86.7 CM
ECHO LA VOL A-L A4C: 63 ML (ref 18–58)
ECHO LA VOL MOD A4C: 57 ML (ref 18–58)
ECHO LV EDV A2C: 132 ML
ECHO LV EDV A4C: 104 ML
ECHO LV EDV BP: 119 ML (ref 67–155)
ECHO LV EF PHYSICIAN: 55 %
ECHO LV EJECTION FRACTION A2C: 68 %
ECHO LV EJECTION FRACTION A4C: 58 %
ECHO LV EJECTION FRACTION BIPLANE: 64 % (ref 55–100)
ECHO LV ESV A2C: 42 ML
ECHO LV ESV A4C: 44 ML
ECHO LV ESV BP: 43 ML (ref 22–58)
ECHO LV INTERNAL DIMENSION DIASTOLIC: 4.6 CM (ref 4.2–5.9)
ECHO LV IVSD: 1.1 CM (ref 0.6–1)
ECHO LV MASS 2D: 169.9 G (ref 88–224)
ECHO LV POSTERIOR WALL DIASTOLIC: 1 CM (ref 0.6–1)
ECHO LV RELATIVE WALL THICKNESS RATIO: 0.43
ECHO LVOT AREA: 2.3 CM2
ECHO LVOT AV VTI INDEX: 0.28
ECHO LVOT DIAM: 1.7 CM
ECHO LVOT MEAN GRADIENT: 2 MMHG
ECHO LVOT PEAK GRADIENT: 4 MMHG
ECHO LVOT PEAK VELOCITY: 1 M/S
ECHO LVOT SV: 55.4 ML
ECHO LVOT VTI: 24.4 CM
ECHO MV A VELOCITY: 0.78 M/S
ECHO MV AREA VTI: 0.2 CM2
ECHO MV E DECELERATION TIME (DT): 412.2 MS
ECHO MV E VELOCITY: 0.54 M/S
ECHO MV E/A RATIO: 0.69
ECHO MV LVOT VTI INDEX: 9.94
ECHO MV MAX VELOCITY: 6.3 M/S
ECHO MV MEAN GRADIENT: 126 MMHG
ECHO MV MEAN VELOCITY: 5.5 M/S
ECHO MV PEAK GRADIENT: 156 MMHG
ECHO MV REGURGITANT PEAK GRADIENT: 41 MMHG
ECHO MV REGURGITANT PEAK VELOCITY: 3.2 M/S
ECHO MV VTI: 242.6 CM
ECHO RV INTERNAL DIMENSION: 2.8 CM
ECHO RV TAPSE: 2.9 CM (ref 1.7–?)
ECHO TV REGURGITANT MAX VELOCITY: 2.17 M/S
ECHO TV REGURGITANT PEAK GRADIENT: 20 MMHG

## 2024-12-03 PROCEDURE — 93306 TTE W/DOPPLER COMPLETE: CPT

## 2024-12-04 ENCOUNTER — TELEPHONE (OUTPATIENT)
Age: 74
End: 2024-12-04

## 2024-12-04 DIAGNOSIS — I35.0 AORTIC VALVE STENOSIS, ETIOLOGY OF CARDIAC VALVE DISEASE UNSPECIFIED: Primary | ICD-10-CM

## 2024-12-04 NOTE — TELEPHONE ENCOUNTER
Echocardiogram reviewed.    The murmur that I heard appears to be due to \"severe stenosis of the aortic valve\".  This is an important valve for all the blood leaves the heart.  Severe stenosis could mean that he is heart is not working as well as it needs to.  I recommend that he see a cardiologist to discuss what if anything needs to be done about this.    Choice of either [facility] or [facility]

## 2024-12-18 ENCOUNTER — HOSPITAL ENCOUNTER (OUTPATIENT)
Facility: HOSPITAL | Age: 74
Discharge: HOME | End: 2024-12-21
Attending: INTERNAL MEDICINE
Payer: MEDICARE

## 2024-12-18 DIAGNOSIS — I35.0 NODULAR CALCIFIC AORTIC VALVE STENOSIS: ICD-10-CM

## 2024-12-18 LAB — CREAT BLD-MCNC: 2.2 MG/DL (ref 0.6–1.3)

## 2024-12-18 PROCEDURE — 75572 CT HRT W/3D IMAGE: CPT

## 2024-12-18 PROCEDURE — 6360000004 HC RX CONTRAST MEDICATION: Performed by: INTERNAL MEDICINE

## 2024-12-18 PROCEDURE — 82565 ASSAY OF CREATININE: CPT

## 2024-12-18 RX ORDER — IOPAMIDOL 755 MG/ML
100 INJECTION, SOLUTION INTRAVASCULAR
Status: COMPLETED | OUTPATIENT
Start: 2024-12-18 | End: 2024-12-18

## 2024-12-18 RX ADMIN — IOPAMIDOL 100 ML: 755 INJECTION, SOLUTION INTRAVENOUS at 14:57

## 2024-12-23 ENCOUNTER — INITIAL CONSULT (OUTPATIENT)
Age: 74
End: 2024-12-23
Payer: MEDICARE

## 2024-12-23 VITALS
WEIGHT: 163 LBS | HEIGHT: 66 IN | TEMPERATURE: 98.5 F | HEART RATE: 66 BPM | OXYGEN SATURATION: 98 % | RESPIRATION RATE: 20 BRPM | BODY MASS INDEX: 26.2 KG/M2 | SYSTOLIC BLOOD PRESSURE: 170 MMHG | DIASTOLIC BLOOD PRESSURE: 85 MMHG

## 2024-12-23 DIAGNOSIS — I35.0 AORTIC STENOSIS, SEVERE: Primary | ICD-10-CM

## 2024-12-23 PROCEDURE — 3079F DIAST BP 80-89 MM HG: CPT | Performed by: NURSE PRACTITIONER

## 2024-12-23 PROCEDURE — 1123F ACP DISCUSS/DSCN MKR DOCD: CPT | Performed by: NURSE PRACTITIONER

## 2024-12-23 PROCEDURE — G8419 CALC BMI OUT NRM PARAM NOF/U: HCPCS | Performed by: NURSE PRACTITIONER

## 2024-12-23 PROCEDURE — G8428 CUR MEDS NOT DOCUMENT: HCPCS | Performed by: NURSE PRACTITIONER

## 2024-12-23 PROCEDURE — 1036F TOBACCO NON-USER: CPT | Performed by: THORACIC SURGERY (CARDIOTHORACIC VASCULAR SURGERY)

## 2024-12-23 PROCEDURE — 3077F SYST BP >= 140 MM HG: CPT | Performed by: NURSE PRACTITIONER

## 2024-12-23 PROCEDURE — G8482 FLU IMMUNIZE ORDER/ADMIN: HCPCS | Performed by: NURSE PRACTITIONER

## 2024-12-23 PROCEDURE — 99205 OFFICE O/P NEW HI 60 MIN: CPT | Performed by: NURSE PRACTITIONER

## 2024-12-23 PROCEDURE — 1126F AMNT PAIN NOTED NONE PRSNT: CPT | Performed by: NURSE PRACTITIONER

## 2024-12-23 NOTE — PROGRESS NOTES
Factors / Comorbidities: Hypertension   Cardiac Status: NYHA Class II, Ejection Fraction = 60%   Coronary Artery Disease: No coronary symptoms   Valve Disease: Aortic Stenosis     Risk stratification for severe AS: Low operative risk    Assessment/Plan:     Severe, aortic stenosis: 12/2024 Echo with EF 55-60%. MG 41 mmHg. TANG 0.6 cm2. Plan for TAVR 1/15/2025 with Dr. Hernandez/Richard  HTN: cont PTA 2.5mg lisinopril po and ASA 81mg daily.  CKD III: optimize electrolytes. Avoid nephrotoxic agents.   Dyslipidemia: continue PTA fenofibrate.      The patient was evaluated by Jose Hernandez/Richard who discussed conventional aortic valve replacement and TAVR, as well as the risks and benefits of both versus continuing medical therapy with the patient and wife. All questions were answered. Through shared decision making, Jose Hernandez/Richard felt that TAVR is a better option for this patient, given age, frailty, and co-morbidities. With all the options available, the patient has agreed to proceed with TAVR for the treatment of his aortic stenosis and will be scheduled for 1/15/2025.     Marcus Frias, APRN - CNP

## 2025-01-08 ENCOUNTER — HOSPITAL ENCOUNTER (OUTPATIENT)
Facility: HOSPITAL | Age: 75
Discharge: HOME | End: 2025-01-11
Payer: MEDICARE

## 2025-01-08 ENCOUNTER — CLINICAL DOCUMENTATION (OUTPATIENT)
Age: 75
End: 2025-01-08

## 2025-01-08 VITALS
RESPIRATION RATE: 16 BRPM | HEART RATE: 69 BPM | HEIGHT: 66 IN | SYSTOLIC BLOOD PRESSURE: 148 MMHG | DIASTOLIC BLOOD PRESSURE: 62 MMHG | TEMPERATURE: 98.1 F | BODY MASS INDEX: 26.43 KG/M2 | OXYGEN SATURATION: 98 % | WEIGHT: 164.46 LBS

## 2025-01-08 LAB
ABO + RH BLD: NORMAL
ALBUMIN SERPL-MCNC: 3.8 G/DL (ref 3.5–5)
ALBUMIN/GLOB SERPL: 1 (ref 1.1–2.2)
ALP SERPL-CCNC: 44 U/L (ref 45–117)
ALT SERPL-CCNC: 25 U/L (ref 12–78)
ANION GAP SERPL CALC-SCNC: 8 MMOL/L (ref 2–12)
AST SERPL-CCNC: 23 U/L (ref 15–37)
BASOPHILS # BLD: 0.06 K/UL (ref 0–0.1)
BASOPHILS NFR BLD: 1.1 % (ref 0–1)
BILIRUB SERPL-MCNC: 0.7 MG/DL (ref 0.2–1)
BLOOD GROUP ANTIBODIES SERPL: NORMAL
BUN SERPL-MCNC: 47 MG/DL (ref 6–20)
BUN/CREAT SERPL: 22 (ref 12–20)
CALCIUM SERPL-MCNC: 9.6 MG/DL (ref 8.5–10.1)
CHLORIDE SERPL-SCNC: 110 MMOL/L (ref 97–108)
CO2 SERPL-SCNC: 22 MMOL/L (ref 21–32)
CREAT SERPL-MCNC: 2.1 MG/DL (ref 0.7–1.3)
DIFFERENTIAL METHOD BLD: ABNORMAL
EOSINOPHIL # BLD: 0.29 K/UL (ref 0–0.4)
EOSINOPHIL NFR BLD: 5.3 % (ref 0–7)
ERYTHROCYTE [DISTWIDTH] IN BLOOD BY AUTOMATED COUNT: 14.1 % (ref 11.5–14.5)
EST. AVERAGE GLUCOSE BLD GHB EST-MCNC: 117 MG/DL
GLOBULIN SER CALC-MCNC: 3.7 G/DL (ref 2–4)
GLUCOSE SERPL-MCNC: 101 MG/DL (ref 65–100)
HBA1C MFR BLD: 5.7 % (ref 4–5.6)
HCT VFR BLD AUTO: 41 % (ref 36.6–50.3)
HGB BLD-MCNC: 14 G/DL (ref 12.1–17)
HISTORY CHECK: NORMAL
IMM GRANULOCYTES # BLD AUTO: 0.01 K/UL (ref 0–0.04)
IMM GRANULOCYTES NFR BLD AUTO: 0.2 % (ref 0–0.5)
INR PPP: 1.1 (ref 0.9–1.1)
LYMPHOCYTES # BLD: 1.41 K/UL (ref 0.8–3.5)
LYMPHOCYTES NFR BLD: 25.9 % (ref 12–49)
MAGNESIUM SERPL-MCNC: 2.6 MG/DL (ref 1.6–2.4)
MCH RBC QN AUTO: 34.1 PG (ref 26–34)
MCHC RBC AUTO-ENTMCNC: 34.1 G/DL (ref 30–36.5)
MCV RBC AUTO: 99.8 FL (ref 80–99)
MONOCYTES # BLD: 0.67 K/UL (ref 0–1)
MONOCYTES NFR BLD: 12.3 % (ref 5–13)
NEUTS SEG # BLD: 3 K/UL (ref 1.8–8)
NEUTS SEG NFR BLD: 55.2 % (ref 32–75)
NRBC # BLD: 0 K/UL (ref 0–0.01)
NRBC BLD-RTO: 0 PER 100 WBC
NT PRO BNP: 796 PG/ML
PLATELET # BLD AUTO: 151 K/UL (ref 150–400)
PMV BLD AUTO: 11.4 FL (ref 8.9–12.9)
POTASSIUM SERPL-SCNC: 4.9 MMOL/L (ref 3.5–5.1)
PROT SERPL-MCNC: 7.5 G/DL (ref 6.4–8.2)
PROTHROMBIN TIME: 11.4 SEC (ref 9–11.1)
RBC # BLD AUTO: 4.11 M/UL (ref 4.1–5.7)
SARS-COV-2 RNA RESP QL NAA+PROBE: NOT DETECTED
SODIUM SERPL-SCNC: 140 MMOL/L (ref 136–145)
SOURCE: NORMAL
SPECIMEN EXP DATE BLD: NORMAL
TSH SERPL DL<=0.05 MIU/L-ACNC: 1.51 UIU/ML (ref 0.36–3.74)
WBC # BLD AUTO: 5.4 K/UL (ref 4.1–11.1)

## 2025-01-08 PROCEDURE — 86900 BLOOD TYPING SEROLOGIC ABO: CPT

## 2025-01-08 PROCEDURE — 85025 COMPLETE CBC W/AUTO DIFF WBC: CPT

## 2025-01-08 PROCEDURE — 83036 HEMOGLOBIN GLYCOSYLATED A1C: CPT

## 2025-01-08 PROCEDURE — 85610 PROTHROMBIN TIME: CPT

## 2025-01-08 PROCEDURE — 83735 ASSAY OF MAGNESIUM: CPT

## 2025-01-08 PROCEDURE — 86901 BLOOD TYPING SEROLOGIC RH(D): CPT

## 2025-01-08 PROCEDURE — 36415 COLL VENOUS BLD VENIPUNCTURE: CPT

## 2025-01-08 PROCEDURE — 83880 ASSAY OF NATRIURETIC PEPTIDE: CPT

## 2025-01-08 PROCEDURE — 80053 COMPREHEN METABOLIC PANEL: CPT

## 2025-01-08 PROCEDURE — 87635 SARS-COV-2 COVID-19 AMP PRB: CPT

## 2025-01-08 PROCEDURE — 84443 ASSAY THYROID STIM HORMONE: CPT

## 2025-01-08 PROCEDURE — 86850 RBC ANTIBODY SCREEN: CPT

## 2025-01-08 RX ORDER — VITAMIN E 268 MG
400 CAPSULE ORAL DAILY
COMMUNITY

## 2025-01-08 ASSESSMENT — PAIN SCALES - GENERAL: PAINLEVEL_OUTOF10: 0

## 2025-01-08 NOTE — PROGRESS NOTES
Incentive Spirometer        Using the incentive spirometer helps expand the small air sacs of your lungs, helps you breathe deeply, and helps improve your lung function.  Use your incentive spirometer twice a day (10 breaths each time) prior to surgery.      How to Use Your Incentive Spirometer:  Hold the incentive spirometer in an upright position.   Breathe out as usual.   Place the mouthpiece in your mouth and seal your lips tightly around it.   Take a deep breath.  Breathe in slowly and as deeply as possible. Keep the blue flow rate guide between the arrows.   Hold your breath as long as possible. Then exhale slowly and allow the piston to fall to the bottom of the column.   Rest for a few seconds and repeat steps one through five at least 10 times.     PAT Tidal Volume_3000_________________  x_2_______________  Date_1/8/25______________________    BRING THE INCENTIVE SPIROMETER WITH YOU TO THE HOSPITAL ON THE DAY OF YOUR SURGERY.  Opportunity given to ask and answer questions as well as to observe return demonstration.    Patient signature_____________________________    Witness____________________________  
surgery:  Shower again thoroughly with an antibacterial soap, such as Dial or the soap provided at your preassessment appointment. If needed, ask someone for help to reach all areas of your body. Don’t forget to clean your belly button! Rinse.  With bottle of Hibiclens/Chlorhexidine in hand, turn water off.  Apply Hibiclens antiseptic skin cleanser with a clean, freshly washed washcloth.  Gently apply to your body from chin to toes (except the genital area) and especially the area(s) where your incision(s) will be.  Leave Hibiclens/Chlorhexidine on your skin for at least 20 seconds.     CAUTION: If needed, Hibiclens/chlorhexidine may be used to clean the folds of skin of the legs (such as in the area of the groin) and on your buttocks and hips. However, do not use Hibiclens/Chlorhexidine above the neck or in the genital area (your bottom) or put inside any area of your body.  Turn the water back on and rinse.  Dry gently with a clean, freshly washed towel.  After your shower, do not use any powder, deodorant, perfumes or lotion prior to surgery.  Put on clean, freshly washed clothing.    Tips to help prevent infections after your surgery:  Protect your surgical wound from germs:  Hand washing is the most important thing you and your caregivers can do to prevent infections.  Keep your bandage clean and dry!  Do not touch your surgical wound.  Use clean, freshly washed towels and washcloths every time you shower; do not share bath linens with others.  Until your surgical wound is healed, wear clothing and sleep on bed linens each day that are clean and freshly washed.  Do not allow pets to sleep in your bed with you or touch your surgical wound.  Do not smoke - smoking delays wound healing. This may be a good time to stop smoking.  If you have diabetes, it is important for you to manage your blood sugar levels properly before your surgery as well as after your surgery. Poorly managed blood sugar levels slow down wound 
you may be late, please call [phone] (Cath Lab).    9. If you have any questions and or problems, please call [phone] (Pre-admission Testing)    Only take Aspirin the morning of surgery with a sip of water.         ___________________      __________   _________    (Signature of Patient)             (Witness)                (Date and Time)

## 2025-01-08 NOTE — H&P
Patient: Maycol Naqvi Jr.   Age: 75 y.o.     Patient Care Team:  Paul Bullock MD as PCP - General (Family Medicine)  [facility]  Tate Tabor MD as Physician  David, Janie Worthy MD as Consulting Physician (Cardiology)  Devon Ramos MD (Cardiothoracic Surgery)    PCP: Paul Bullock MD    Cardiologist: David    Diagnosis/Reason for Consultation: The encounter diagnosis was Aortic stenosis.     HPI: 74 y.o. male with PMHx of HTN, CKD III, kidney stones, dyslipidemia, sleep apnea, and recent diagnosed severe, aortic stenosis that is referred [facility] by Dr. Hernandez for interventional evaluation of  Severe, Aortic Stenosis.    Endorses CP, SOB, and fatigue (chest pain more so GERD like pain). Denies PND, orthopnea, LE edema, syncope, dizziness, palpitations, claudication, and weight gain >3 lbs in one day or >5 lbs in one week    Past medical/surgical history positive for kidney disease (CKD III) and prostate disease (cancer w/removal). PM/SH negative for CVA/TIA, difficulty swallowing, thyroid disease, dysrhythmias, liver disease, blood, blackness or tarriness of stool, frequent UTIs, issues with bleeding or blood clots, cancer within the past 5 years, hospitalizations for HF within the past year, medication changes in the past 3 months, and is not considered immunocompromised.    Social history positive for  none . Denies tobacco use and alcohol use. Family history positive for diabetes .     Functional status: ambulates without assistive device, driving, independent ADLs. Lives [place] from [facility] with wife.     COVID vaccine status: Fully vaccinated.    (-) hx of CVA/TIA: Date:   (-) previous TAVR  (-) PPM  Date of initial implant:     NYHA Classification: II   Class I (Mild): No limitation of physical activity. Ordinary physical activity does not cause undue fatigue, palpitation, or dyspnea.   Class II (Mild): Slight limitation of

## 2025-01-08 NOTE — PROGRESS NOTES
Patient was discussed in valve conference on 1/8/2025 with Dr. Ortiz, Dr. Hernandez, Dr. Ramos, and Dr. Shannon. Plan for TAVR #23+ RTF with Dr. Hernandez and Dr. Ramos on 01/15/2025.

## 2025-01-08 NOTE — PROGRESS NOTES
Please obtain the following for PAT:  CBC with diff  CMP  Mag  PT/INR  BNP  A1C  TSH  Type and cross: Prepare 1 and keep 1 units ahead  UA with reflex to culture *only if symptomatic*    Thank you!    Medication instructions:     Only take ASA the day of procedure.     Marcus Frias, APRN - CNP

## 2025-01-08 NOTE — CONSENT
Informed Consent for Blood Component Transfusion Note    I have discussed with the patient the rationale for blood component transfusion; its benefits in treating or preventing fatigue, organ damage, or death; and its risk which includes mild transfusion reactions, rare risk of blood borne infection, or more serious but rare reactions. I have discussed the alternatives to transfusion, including the risk and consequences of not receiving transfusion. The patient had an opportunity to ask questions and had agreed to proceed with transfusion of blood components.    Electronically signed by RYAN James CNP on 1/8/25 at 1:16 PM EST

## 2025-01-15 ENCOUNTER — APPOINTMENT (OUTPATIENT)
Facility: HOSPITAL | Age: 75
DRG: 267 | End: 2025-01-15
Attending: INTERNAL MEDICINE
Payer: MEDICARE

## 2025-01-15 ENCOUNTER — ANESTHESIA EVENT (OUTPATIENT)
Facility: HOSPITAL | Age: 75
End: 2025-01-15
Payer: MEDICARE

## 2025-01-15 ENCOUNTER — ANESTHESIA (OUTPATIENT)
Facility: HOSPITAL | Age: 75
End: 2025-01-15
Payer: MEDICARE

## 2025-01-15 ENCOUNTER — HOSPITAL ENCOUNTER (INPATIENT)
Facility: HOSPITAL | Age: 75
LOS: 1 days | Discharge: HOME | DRG: 267 | End: 2025-01-16
Attending: INTERNAL MEDICINE | Admitting: INTERNAL MEDICINE
Payer: MEDICARE

## 2025-01-15 DIAGNOSIS — I35.0 NONRHEUMATIC AORTIC VALVE STENOSIS: Primary | ICD-10-CM

## 2025-01-15 DIAGNOSIS — Z95.2 S/P TAVR (TRANSCATHETER AORTIC VALVE REPLACEMENT): Primary | ICD-10-CM

## 2025-01-15 DIAGNOSIS — I35.0 AORTIC STENOSIS: ICD-10-CM

## 2025-01-15 LAB
ABO + RH BLD: NORMAL
ACT BLD: 262 SECS (ref 79–138)
BLD PROD TYP BPU: NORMAL
BLOOD BANK DISPENSE STATUS: NORMAL
BLOOD GROUP ANTIBODIES SERPL: NORMAL
BPU ID: NORMAL
CROSSMATCH RESULT: NORMAL
ECHO AV AREA PEAK VELOCITY: 1.5 CM2
ECHO AV AREA VTI: 1.6 CM2
ECHO AV AREA/BSA PEAK VELOCITY: 0.8 CM2/M2
ECHO AV AREA/BSA VTI: 0.9 CM2/M2
ECHO AV MEAN GRADIENT: 9 MMHG
ECHO AV MEAN VELOCITY: 1.4 M/S
ECHO AV PEAK GRADIENT: 18 MMHG
ECHO AV PEAK VELOCITY: 2.1 M/S
ECHO AV VELOCITY RATIO: 0.71
ECHO AV VTI: 44.4 CM
ECHO BSA: 1.83 M2
ECHO LV EF PHYSICIAN: 60 %
ECHO LV EF PHYSICIAN: 60 %
ECHO LV FRACTIONAL SHORTENING: 52 % (ref 28–44)
ECHO LV INTERNAL DIMENSION DIASTOLE INDEX: 2.54 CM/M2
ECHO LV INTERNAL DIMENSION DIASTOLIC: 4.6 CM (ref 4.2–5.9)
ECHO LV INTERNAL DIMENSION SYSTOLIC INDEX: 1.22 CM/M2
ECHO LV INTERNAL DIMENSION SYSTOLIC: 2.2 CM
ECHO LV IVSD: 1 CM (ref 0.6–1)
ECHO LV MASS 2D: 158.8 G (ref 88–224)
ECHO LV MASS INDEX 2D: 87.7 G/M2 (ref 49–115)
ECHO LV POSTERIOR WALL DIASTOLIC: 1 CM (ref 0.6–1)
ECHO LV RELATIVE WALL THICKNESS RATIO: 0.43
ECHO LVOT AREA: 2 CM2
ECHO LVOT AV VTI INDEX: 0.79
ECHO LVOT DIAM: 1.6 CM
ECHO LVOT MEAN GRADIENT: 5 MMHG
ECHO LVOT PEAK GRADIENT: 10 MMHG
ECHO LVOT PEAK VELOCITY: 1.5 M/S
ECHO LVOT STROKE VOLUME INDEX: 39.1 ML/M2
ECHO LVOT SV: 70.7 ML
ECHO LVOT VTI: 35.2 CM
HCT VFR BLD AUTO: 36 % (ref 36.6–50.3)
HGB BLD-MCNC: 12 G/DL (ref 12.1–17)
SPECIMEN EXP DATE BLD: NORMAL
UNIT DIVISION: 0

## 2025-01-15 PROCEDURE — C1894 INTRO/SHEATH, NON-LASER: HCPCS | Performed by: INTERNAL MEDICINE

## 2025-01-15 PROCEDURE — 2500000003 HC RX 250 WO HCPCS: Performed by: NURSE PRACTITIONER

## 2025-01-15 PROCEDURE — 6360000002 HC RX W HCPCS: Performed by: NURSE ANESTHETIST, CERTIFIED REGISTERED

## 2025-01-15 PROCEDURE — C1769 GUIDE WIRE: HCPCS | Performed by: INTERNAL MEDICINE

## 2025-01-15 PROCEDURE — 02RF38Z REPLACEMENT OF AORTIC VALVE WITH ZOOPLASTIC TISSUE, PERCUTANEOUS APPROACH: ICD-10-PCS | Performed by: THORACIC SURGERY (CARDIOTHORACIC VASCULAR SURGERY)

## 2025-01-15 PROCEDURE — 6360000004 HC RX CONTRAST MEDICATION: Performed by: INTERNAL MEDICINE

## 2025-01-15 PROCEDURE — 2500000003 HC RX 250 WO HCPCS: Performed by: NURSE ANESTHETIST, CERTIFIED REGISTERED

## 2025-01-15 PROCEDURE — 85018 HEMOGLOBIN: CPT

## 2025-01-15 PROCEDURE — 3700000001 HC ADD 15 MINUTES (ANESTHESIA): Performed by: INTERNAL MEDICINE

## 2025-01-15 PROCEDURE — 93308 TTE F-UP OR LMTD: CPT

## 2025-01-15 PROCEDURE — 3700000000 HC ANESTHESIA ATTENDED CARE: Performed by: INTERNAL MEDICINE

## 2025-01-15 PROCEDURE — 2709999900 HC NON-CHARGEABLE SUPPLY: Performed by: INTERNAL MEDICINE

## 2025-01-15 PROCEDURE — 2060000000 HC ICU INTERMEDIATE R&B

## 2025-01-15 PROCEDURE — 33210 INSERT ELECTRD/PM CATH SNGL: CPT | Performed by: INTERNAL MEDICINE

## 2025-01-15 PROCEDURE — C1760 CLOSURE DEV, VASC: HCPCS | Performed by: INTERNAL MEDICINE

## 2025-01-15 PROCEDURE — 93005 ELECTROCARDIOGRAM TRACING: CPT | Performed by: INTERNAL MEDICINE

## 2025-01-15 PROCEDURE — 33361 REPLACE AORTIC VALVE PERQ: CPT | Performed by: INTERNAL MEDICINE

## 2025-01-15 PROCEDURE — 93306 TTE W/DOPPLER COMPLETE: CPT

## 2025-01-15 PROCEDURE — 33361 REPLACE AORTIC VALVE PERQ: CPT | Performed by: THORACIC SURGERY (CARDIOTHORACIC VASCULAR SURGERY)

## 2025-01-15 PROCEDURE — 6370000000 HC RX 637 (ALT 250 FOR IP): Performed by: NURSE PRACTITIONER

## 2025-01-15 PROCEDURE — C1725 CATH, TRANSLUMIN NON-LASER: HCPCS | Performed by: INTERNAL MEDICINE

## 2025-01-15 PROCEDURE — 36415 COLL VENOUS BLD VENIPUNCTURE: CPT

## 2025-01-15 PROCEDURE — 85347 COAGULATION TIME ACTIVATED: CPT

## 2025-01-15 PROCEDURE — 85014 HEMATOCRIT: CPT

## 2025-01-15 PROCEDURE — 2580000003 HC RX 258: Performed by: NURSE ANESTHETIST, CERTIFIED REGISTERED

## 2025-01-15 RX ORDER — CEFAZOLIN SODIUM 1 G/3ML
INJECTION, POWDER, FOR SOLUTION INTRAMUSCULAR; INTRAVENOUS
Status: DISCONTINUED | OUTPATIENT
Start: 2025-01-15 | End: 2025-01-15 | Stop reason: SDUPTHER

## 2025-01-15 RX ORDER — SODIUM CHLORIDE 0.9 % (FLUSH) 0.9 %
5-40 SYRINGE (ML) INJECTION PRN
Status: DISCONTINUED | OUTPATIENT
Start: 2025-01-15 | End: 2025-01-16 | Stop reason: HOSPADM

## 2025-01-15 RX ORDER — LISINOPRIL 5 MG/1
2.5 TABLET ORAL DAILY
Status: DISCONTINUED | OUTPATIENT
Start: 2025-01-16 | End: 2025-01-16 | Stop reason: HOSPADM

## 2025-01-15 RX ORDER — ALLOPURINOL 100 MG/1
200 TABLET ORAL DAILY
Status: DISCONTINUED | OUTPATIENT
Start: 2025-01-16 | End: 2025-01-16 | Stop reason: HOSPADM

## 2025-01-15 RX ORDER — SODIUM CHLORIDE 9 MG/ML
INJECTION, SOLUTION INTRAVENOUS PRN
Status: DISCONTINUED | OUTPATIENT
Start: 2025-01-15 | End: 2025-01-16 | Stop reason: HOSPADM

## 2025-01-15 RX ORDER — ONDANSETRON 2 MG/ML
4 INJECTION INTRAMUSCULAR; INTRAVENOUS EVERY 6 HOURS PRN
Status: DISCONTINUED | OUTPATIENT
Start: 2025-01-15 | End: 2025-01-16 | Stop reason: HOSPADM

## 2025-01-15 RX ORDER — UBIDECARENONE 75 MG
100 CAPSULE ORAL DAILY
Status: DISCONTINUED | OUTPATIENT
Start: 2025-01-16 | End: 2025-01-16 | Stop reason: HOSPADM

## 2025-01-15 RX ORDER — POTASSIUM CITRATE 15 MEQ/1
15 TABLET, EXTENDED RELEASE ORAL
Status: DISCONTINUED | OUTPATIENT
Start: 2025-01-16 | End: 2025-01-16 | Stop reason: HOSPADM

## 2025-01-15 RX ORDER — ASCORBIC ACID 500 MG
500 TABLET ORAL
Status: DISCONTINUED | OUTPATIENT
Start: 2025-01-15 | End: 2025-01-16 | Stop reason: HOSPADM

## 2025-01-15 RX ORDER — PROTAMINE SULFATE 10 MG/ML
INJECTION, SOLUTION INTRAVENOUS
Status: DISCONTINUED | OUTPATIENT
Start: 2025-01-15 | End: 2025-01-15 | Stop reason: SDUPTHER

## 2025-01-15 RX ORDER — ASPIRIN 81 MG/1
81 TABLET ORAL DAILY
Status: DISCONTINUED | OUTPATIENT
Start: 2025-01-16 | End: 2025-01-16 | Stop reason: HOSPADM

## 2025-01-15 RX ORDER — ACETAMINOPHEN 325 MG/1
650 TABLET ORAL EVERY 4 HOURS PRN
Status: DISCONTINUED | OUTPATIENT
Start: 2025-01-15 | End: 2025-01-16 | Stop reason: HOSPADM

## 2025-01-15 RX ORDER — SODIUM CHLORIDE 9 MG/ML
INJECTION, SOLUTION INTRAVENOUS
Status: DISCONTINUED | OUTPATIENT
Start: 2025-01-15 | End: 2025-01-15 | Stop reason: SDUPTHER

## 2025-01-15 RX ORDER — SODIUM CHLORIDE 0.9 % (FLUSH) 0.9 %
5-40 SYRINGE (ML) INJECTION EVERY 12 HOURS SCHEDULED
Status: DISCONTINUED | OUTPATIENT
Start: 2025-01-15 | End: 2025-01-16 | Stop reason: HOSPADM

## 2025-01-15 RX ORDER — IOPAMIDOL 755 MG/ML
INJECTION, SOLUTION INTRAVASCULAR PRN
Status: DISCONTINUED | OUTPATIENT
Start: 2025-01-15 | End: 2025-01-15 | Stop reason: HOSPADM

## 2025-01-15 RX ORDER — ONDANSETRON 4 MG/1
4 TABLET, ORALLY DISINTEGRATING ORAL EVERY 8 HOURS PRN
Status: DISCONTINUED | OUTPATIENT
Start: 2025-01-15 | End: 2025-01-16 | Stop reason: HOSPADM

## 2025-01-15 RX ORDER — VITAMIN B COMPLEX
500 TABLET ORAL 2 TIMES DAILY
Status: DISCONTINUED | OUTPATIENT
Start: 2025-01-16 | End: 2025-01-16 | Stop reason: HOSPADM

## 2025-01-15 RX ORDER — HEPARIN SODIUM 1000 [USP'U]/ML
INJECTION, SOLUTION INTRAVENOUS; SUBCUTANEOUS
Status: DISCONTINUED | OUTPATIENT
Start: 2025-01-15 | End: 2025-01-15 | Stop reason: SDUPTHER

## 2025-01-15 RX ORDER — EPHEDRINE SULFATE/0.9% NACL/PF 50 MG/5 ML
SYRINGE (ML) INTRAVENOUS
Status: DISCONTINUED | OUTPATIENT
Start: 2025-01-15 | End: 2025-01-15 | Stop reason: SDUPTHER

## 2025-01-15 RX ORDER — SODIUM CHLORIDE, SODIUM LACTATE, POTASSIUM CHLORIDE, CALCIUM CHLORIDE 600; 310; 30; 20 MG/100ML; MG/100ML; MG/100ML; MG/100ML
INJECTION, SOLUTION INTRAVENOUS
Status: DISCONTINUED | OUTPATIENT
Start: 2025-01-15 | End: 2025-01-15 | Stop reason: SDUPTHER

## 2025-01-15 RX ORDER — DEXMEDETOMIDINE HYDROCHLORIDE 100 UG/ML
INJECTION, SOLUTION INTRAVENOUS
Status: DISCONTINUED | OUTPATIENT
Start: 2025-01-15 | End: 2025-01-15 | Stop reason: SDUPTHER

## 2025-01-15 RX ORDER — FENOFIBRATE 160 MG/1
160 TABLET ORAL DAILY
Status: DISCONTINUED | OUTPATIENT
Start: 2025-01-16 | End: 2025-01-16 | Stop reason: HOSPADM

## 2025-01-15 RX ORDER — VITAMIN E 268 MG
400 CAPSULE ORAL DAILY
Status: DISCONTINUED | OUTPATIENT
Start: 2025-01-16 | End: 2025-01-16 | Stop reason: HOSPADM

## 2025-01-15 RX ORDER — ONDANSETRON 2 MG/ML
INJECTION INTRAMUSCULAR; INTRAVENOUS
Status: DISCONTINUED | OUTPATIENT
Start: 2025-01-15 | End: 2025-01-15 | Stop reason: SDUPTHER

## 2025-01-15 RX ORDER — FENTANYL CITRATE 50 UG/ML
INJECTION, SOLUTION INTRAMUSCULAR; INTRAVENOUS
Status: DISCONTINUED | OUTPATIENT
Start: 2025-01-15 | End: 2025-01-15 | Stop reason: SDUPTHER

## 2025-01-15 RX ORDER — GLYCOPYRROLATE 0.2 MG/ML
INJECTION INTRAMUSCULAR; INTRAVENOUS
Status: DISCONTINUED | OUTPATIENT
Start: 2025-01-15 | End: 2025-01-15 | Stop reason: SDUPTHER

## 2025-01-15 RX ADMIN — DEXMEDETOMIDINE 40 MCG: 100 INJECTION, SOLUTION INTRAVENOUS at 07:27

## 2025-01-15 RX ADMIN — PHENYLEPHRINE HYDROCHLORIDE 40 MCG: 10 INJECTION INTRAVENOUS at 09:12

## 2025-01-15 RX ADMIN — SODIUM CHLORIDE, PRESERVATIVE FREE 20 ML: 5 INJECTION INTRAVENOUS at 12:05

## 2025-01-15 RX ADMIN — HEPARIN SODIUM 4000 UNITS: 1000 INJECTION, SOLUTION INTRAVENOUS; SUBCUTANEOUS at 08:52

## 2025-01-15 RX ADMIN — HEPARIN SODIUM 5000 UNITS: 1000 INJECTION, SOLUTION INTRAVENOUS; SUBCUTANEOUS at 08:37

## 2025-01-15 RX ADMIN — OXYCODONE HYDROCHLORIDE AND ACETAMINOPHEN 500 MG: 500 TABLET ORAL at 13:52

## 2025-01-15 RX ADMIN — FENTANYL CITRATE 25 MCG: 50 INJECTION, SOLUTION INTRAMUSCULAR; INTRAVENOUS at 09:21

## 2025-01-15 RX ADMIN — PROPOFOL 30 MG: 10 INJECTION, EMULSION INTRAVENOUS at 08:00

## 2025-01-15 RX ADMIN — GLYCOPYRROLATE 0.2 MG: 0.2 INJECTION, SOLUTION INTRAMUSCULAR; INTRAVENOUS at 08:16

## 2025-01-15 RX ADMIN — ONDANSETRON HYDROCHLORIDE 4 MG: 2 INJECTION, SOLUTION INTRAMUSCULAR; INTRAVENOUS at 08:26

## 2025-01-15 RX ADMIN — Medication 5 MG: at 08:38

## 2025-01-15 RX ADMIN — PHENYLEPHRINE HYDROCHLORIDE 120 MCG: 10 INJECTION INTRAVENOUS at 09:16

## 2025-01-15 RX ADMIN — PROTAMINE SULFATE 100 MG: 10 INJECTION, SOLUTION INTRAVENOUS at 09:12

## 2025-01-15 RX ADMIN — CEFAZOLIN 2 G: 1 INJECTION, POWDER, FOR SOLUTION INTRAMUSCULAR; INTRAVENOUS; PARENTERAL at 08:31

## 2025-01-15 RX ADMIN — SODIUM CHLORIDE, POTASSIUM CHLORIDE, SODIUM LACTATE AND CALCIUM CHLORIDE: 600; 310; 30; 20 INJECTION, SOLUTION INTRAVENOUS at 07:52

## 2025-01-15 RX ADMIN — HEPARIN SODIUM 3000 UNITS: 1000 INJECTION, SOLUTION INTRAVENOUS; SUBCUTANEOUS at 09:05

## 2025-01-15 RX ADMIN — PHENYLEPHRINE HYDROCHLORIDE 30 MCG/MIN: 10 INJECTION INTRAVENOUS at 08:02

## 2025-01-15 RX ADMIN — FENTANYL CITRATE 50 MCG: 50 INJECTION, SOLUTION INTRAMUSCULAR; INTRAVENOUS at 08:31

## 2025-01-15 RX ADMIN — SODIUM CHLORIDE: 9 INJECTION, SOLUTION INTRAVENOUS at 08:35

## 2025-01-15 RX ADMIN — PROPOFOL 50 MCG/KG/MIN: 10 INJECTION, EMULSION INTRAVENOUS at 08:01

## 2025-01-15 RX ADMIN — FENTANYL CITRATE 25 MCG: 50 INJECTION, SOLUTION INTRAMUSCULAR; INTRAVENOUS at 09:25

## 2025-01-15 RX ADMIN — SODIUM CHLORIDE, PRESERVATIVE FREE 10 ML: 5 INJECTION INTRAVENOUS at 12:05

## 2025-01-15 NOTE — PROGRESS NOTES
Patient arrived to IVCU from Recovery post TAVR. Right radial, TR band at 16cc, Left radial TR band at 11, CDI, no bleeding/hematoma, RIJ dressing CDI, Right femoral site CDI, no bleeding/hematoma.VSS,Sinus Jamison, RA, Afebrile. Patient on bedrest.

## 2025-01-15 NOTE — ANESTHESIA POSTPROCEDURE EVALUATION
Post-Anesthesia Evaluation and Assessment    Patient: Maycol Naqvi Jr. MRN: [id number]  SSN: [id number]    YOB: 1950  Age: 75 y.o.  Sex: male      I have evaluated the patient and they are stable and ready for discharge from the PACU.     Cardiovascular Function/Vital Signs  Visit Vitals  BP (!) 93/59   Pulse 52   Temp 98 °F (36.7 °C) (Oral)   Resp 11   Ht 1.676 m (5' 5.98\")   Wt 72.1 kg (159 lb)   SpO2 97%   BMI 25.68 kg/m²       Patient is status post General anesthesia for Procedure(s):  Transcatheter aortic valve replacement.    Nausea/Vomiting: None    Postoperative hydration reviewed and adequate.    Pain:      Managed    Neurological Status:       At baseline    Mental Status, Level of Consciousness: Alert and oriented     Pulmonary Status:       Adequate oxygenation and airway patent    Complications related to anesthesia: None    Post-anesthesia assessment completed. No concerns    Signed By: Yuval Costa MD     January 15, 2025

## 2025-01-15 NOTE — CARDIO/PULMONARY
Chart reviewed: Patient is 75 y.o. male admitted with Aortic stenosis [I35.0]  Aortic stenosis, severe [I35.0]    Education: Trans-catheter education folder at the bedside.  Met with Maycol Naqvi Jr..     Educated using teach back method. Reviewed daily weights and temperatures, signs and symptoms of infection at surgery sites, valve type and card, and use of incentive spirometer.  Smoking history assessed. Patient is not a smoker.  Encouraged Heart Healthy, Low Sodium (2000 mg) diet.    Discussed Cardiac Rehab Program benefits, format, and encouraged participation. Initial Cardiac Rehab Program intake appointment not scheduled at this time. Patient is requesting to be contacted in a couple weeks to see if interested in program.   General questions answered. Maycol Naqvi Jr. verbalized understanding.     Will continue to follow for educational needs and enrollment in the Cardiac Rehab Program.     ZI HERMOSILLO RN

## 2025-01-15 NOTE — PROGRESS NOTES
Update History & Physical    The patient's History and Physical of January 8, 2025 was reviewed with the patient and I examined the patient. There was no change. The surgical site was confirmed by the patient and me.       Plan: The risks, benefits, expected outcome, and alternative to the recommended procedure have been discussed with the patient. Patient understands and wants to proceed with the procedure.     Electronically signed by RYAN James CNP on 1/15/2025 at 7:19 AM

## 2025-01-15 NOTE — ANESTHESIA PRE PROCEDURE
Component Value Date/Time    WBC 5.4 01/08/2025 01:05 PM    RBC 4.11 01/08/2025 01:05 PM    HGB 14.0 01/08/2025 01:05 PM    HCT 41.0 01/08/2025 01:05 PM    MCV 99.8 01/08/2025 01:05 PM    RDW 14.1 01/08/2025 01:05 PM     01/08/2025 01:05 PM       CMP:   Lab Results   Component Value Date/Time     01/08/2025 01:05 PM    K 4.9 01/08/2025 01:05 PM     01/08/2025 01:05 PM    CO2 22 01/08/2025 01:05 PM    BUN 47 01/08/2025 01:05 PM    CREATININE 2.10 01/08/2025 01:05 PM    GFRAA 34 07/29/2022 02:56 PM    AGRATIO 1.4 01/31/2023 10:11 AM    LABGLOM 32 01/08/2025 01:05 PM    LABGLOM 31 10/27/2023 11:03 AM    LABGLOM 30 01/31/2023 10:11 AM    GLUCOSE 101 01/08/2025 01:05 PM    CALCIUM 9.6 01/08/2025 01:05 PM    BILITOT 0.7 01/08/2025 01:05 PM    ALKPHOS 44 01/08/2025 01:05 PM    ALKPHOS 45 01/31/2023 10:11 AM    AST 23 01/08/2025 01:05 PM    ALT 25 01/08/2025 01:05 PM       POC Tests: No results for input(s): \"POCGLU\", \"POCNA\", \"POCK\", \"POCCL\", \"POCBUN\", \"POCHEMO\", \"POCHCT\" in the last 72 hours.    Coags:   Lab Results   Component Value Date/Time    PROTIME 11.4 01/08/2025 01:05 PM    INR 1.1 01/08/2025 01:05 PM       HCG (If Applicable): No results found for: \"PREGTESTUR\", \"PREGSERUM\", \"HCG\", \"HCGQUANT\"     ABGs: No results found for: \"PHART\", \"PO2ART\", \"QVT5PUD\", \"PFM5JMJ\", \"BEART\", \"Y8BKTMOO\"     Type & Screen (If Applicable):  Lab Results   Component Value Date    ABORH O POSITIVE 01/08/2025    LABANTI NEG 01/08/2025       Drug/Infectious Status (If Applicable):  Lab Results   Component Value Date/Time    HEPCAB <0.1 03/05/2019 04:25 PM       COVID-19 Screening (If Applicable):   Lab Results   Component Value Date/Time    COVID19 Not detected 01/08/2025 01:05 PM           Anesthesia Evaluation  Patient summary reviewed and Nursing notes reviewed  Airway: Mallampati: II  TM distance: >3 FB   Neck ROM: full  Mouth opening: > = 3 FB   Dental: normal exam         Pulmonary:Negative Pulmonary ROS and

## 2025-01-15 NOTE — PROGRESS NOTES
Clinical Update:    Maycol Naqvi Jr. seen in [facility] s/p TAVR with Dr. Ramos and with Dr. Hernandez under MAC anesthesia on 1/15/2025.      Patient is groggy but wakes to speech. Access sites soft with gauze dressing-clean, dry, intact. PPP.     Anticoagulation plan for ASA low dose. Plan for postoperative TTE and EKG.    Vitals:    01/15/25 0940   BP: (!) 112/57   Pulse: 59   Resp: 15   Temp:    SpO2: 98%       Signed:  Marcus Frias, RYAN - CNP  1/15/2025  9:50 AM

## 2025-01-15 NOTE — PROGRESS NOTES
Post-Structural Heart End of Shift Note/Checklist    Maycol Naqvi Jr.  POD: Day of Surgery    Procedure:  Procedure(s):  Transcatheter aortic valve replacement    Shift: 1722-1960  Significant events or complaints during shift: None.  Cardiac rhythm: NSR SB at rest     Vital Signs: Vital signs stable  O2 status: On room air  Febrile/afebrile: Afebrile  Current infusions: none.    Number of times and distance patient has ambulated: ambulated in the hallway, tolerated well .   How is patient tolerating ambulation? Tolerated well .     Patient diet: Cardiac diet.  Intake of diet: tolerating well.  Is the patient tolerating eating?  Yes.  Is the patient voiding without difficulty? Yes.  Is the patient passing gas? Yes  Date of last BM: yesterday  .      Checklist:   Are I&O documented and accurate? Yes.  For nightshift, is a daily weight documented? N/A  Was this weight standing? N/A.  Are labs drawn and resulted? N/A.  Has the patient's post-procedure echo been done yet? Yes.  Post-TAVR patients without an existing PPM- is the EKG done and has it transmitted to Epic? Yes.  Have dressings been removed? N/A.

## 2025-01-15 NOTE — PROGRESS NOTES
SHEATH PULL NOTE:    Patient informed of procedure with questions answered with review. Sheath site prepped with Chloraprep swab. 6 fr sheath in right ij pulled by GABBY colon. Hand hold and manual pressure, with manual compression to site. No bleeding, no hematoma, no pain at site. Hemostasis obtained with hand hold/manual compression at site. Patient tolerated well. No change in status. Handhold for 10 minutes. No change at site. Quick clot and tegaderm dressing applied to site. No bleeding, no hematoma, no pain/discomfort at site. Groin instructions provided with review. Continue to monitor procedure site and patient status.    *Advised patient to keep head flat and extremity flat to decrease risk of bleeding.    *Recommended that patient not drink for ONE HOUR post sheath pull completion.    *Recommended that patient not eat for TWO HOURS post sheath pull completion.    *Instructed patient on rationale for delay of PO products to decrease risk for aspiration and if additional treatment to procedure site is required. Patient verbalized understanding of instructions with review.

## 2025-01-15 NOTE — OP NOTE
Operative Note      PTAVR OPERATIVE NOTE     Date of Procedure: 1/15/2025      Referring Physician/Primary cardioogist: Dr Tabor     Primary CARE provider: Paul Bullock MD      Primary Surgeon: Janie Hernandez MD [facility]     Co-surgeons: Claudine Ramos/Rajani        Procedures performed  Transcatheter aortic valve replacement (TAVR) with 23 mm Hernandes Sapein resilia  (+2)via right femoral approach regurgitation  Aortic root and Femoral angiography  Placement and removal of temporary pacemaker     Indication: Severe symptomatic degenerative non- rheumatic aortic stenosis.  Heart team recommended percutaneous approach.     Description of Procedure:  I discussed, in detail ,the risks/benefits/alternatives of the procedure with the patient. I discussed risks  and possible complications, including  (but are not limited to) bleeding,infection,stroke,heart attack, need for emergency surgery/pericardiocentesis, need for dialysis or death. All the questions were answered. The patient understands and wishes to proceed.   After informed consent patient was brought to the cardiac catheterization lab results [facility].  Patient was prepped in a strict sterile manner.  Detailed time was performed.  Prophylactic antibiotics given.  We accessed right radial artery under ultrasound guidance and a 6 Marshallese sheath was placed.  Patient is given 5000 units heparin.  We had that sheath a pigtail catheter was advanced and aortogram was performed.  Simultaneously we obtained access to the patient's right internal jugular vein under ultrasound and fluoroscopic guidance and a 6 Marshallese sheath was placed.  Via venous sheath a temporary pacemaker was advanced and placed in appropriate position with confirmed capture.   Patient right common femoral artery was accessed using modified technique and ultrasound and fluoroscopic guidance using micropuncture set.  Micropuncture cath angiogram showed appropriate arteriotomy  site followed by appropriate dilation and placement of 2 Pro-glide suture mid devices.  Initially 8 Burmese sheath was placed, there was quickly upgraded to a 14F delivery sheath.  Patient fully heparinized this point.  Aortic was crossed in retrograde fashion using JR4 and a J-tip wire.  BAV was performed with True Balloon. Valve delivery system was advanced under fluoroscopic guidance the valve was deployed in excellent position under rapid pacing.  Transthoracic echocardiogram showed well-functioning bioprosthesis no paravalvular leak no pericardial effusion.  At this point all the catheters and wires removed patient's cardiac chambers.  Protamine sulfate was given to reverse heparinization.  Right common femoral artery was closed using pre deployed suture mid devices.  Completion angiogram of the radial site showed patency of the femoral vessels and good hemostasis.  Temporary pacemaker removed and venous site was closed with additional Perclose.  Right radial site was closed using TR band.  Patient tolerated procedure well and there is no immediate complication.  I personally updated the family.        Estimated Blood Loss: Minimal  Specimens Removed: None     Complications: None   Implants: 23 ESIII resilia

## 2025-01-15 NOTE — PROGRESS NOTES
Cardiac Cath Lab Recovery Arrival Note:      Maycol Nqavi Jr. arrived to Cardiac Cath Lab, Recovery Area. Staff introduced to patient. Patient identifiers verified with NAME and DATE OF BIRTH. Procedure verified with patient. Consent forms reviewed and signed by patient or authorized representative and verified. Allergies verified.     Patient and family oriented to department. Patient and family informed of procedure and plan of care.     Questions answered with review. Patient prepped for procedure, per orders from physician, prior to arrival.    Patient on cardiac monitor, non-invasive blood pressure, SPO2 monitor. On RA. Patient is A&Ox 4. Patient reports NO PAIN.     Patient in stretcher, in low position, with side rails up, call bell within reach, patient instructed to call if assistance as needed.    Patient prep in: [facility] Recovery Area, [place] [address].   Patient family has pager #   Family in: .   Prep by: MORAIMA

## 2025-01-15 NOTE — ANESTHESIA PROCEDURE NOTES
Arterial Line:    An arterial line was placed using surface landmarks, in the pre-op for the following indication(s): continuous blood pressure monitoring and blood sampling needed.    A 20 gauge (size) (length), Arrow (type) catheter was placed, Seldinger technique used, into the left radial artery, secured by Tegaderm.  Anesthesia type: Local    Events:  patient tolerated procedure well with no complications.1/15/2025 7:20 AM1/15/2025 7:25 AM  Performed: Other anesthesia staff   Preanesthetic Checklist  Completed: patient identified, risks and benefits discussed, surgical/procedural consents, equipment checked, timeout performed, anesthesia consent given, oxygen available and monitors applied/VS acknowledged

## 2025-01-16 ENCOUNTER — APPOINTMENT (OUTPATIENT)
Facility: HOSPITAL | Age: 75
DRG: 267 | End: 2025-01-16
Attending: INTERNAL MEDICINE
Payer: MEDICARE

## 2025-01-16 VITALS
WEIGHT: 159 LBS | TEMPERATURE: 98.3 F | HEART RATE: 66 BPM | BODY MASS INDEX: 25.55 KG/M2 | OXYGEN SATURATION: 95 % | SYSTOLIC BLOOD PRESSURE: 127 MMHG | RESPIRATION RATE: 14 BRPM | DIASTOLIC BLOOD PRESSURE: 63 MMHG | HEIGHT: 66 IN

## 2025-01-16 LAB
ANION GAP SERPL CALC-SCNC: 5 MMOL/L (ref 2–12)
BUN SERPL-MCNC: 36 MG/DL (ref 6–20)
BUN/CREAT SERPL: 18 (ref 12–20)
CALCIUM SERPL-MCNC: 9.1 MG/DL (ref 8.5–10.1)
CHLORIDE SERPL-SCNC: 111 MMOL/L (ref 97–108)
CO2 SERPL-SCNC: 24 MMOL/L (ref 21–32)
CREAT SERPL-MCNC: 1.96 MG/DL (ref 0.7–1.3)
ERYTHROCYTE [DISTWIDTH] IN BLOOD BY AUTOMATED COUNT: 14.1 % (ref 11.5–14.5)
GLUCOSE SERPL-MCNC: 119 MG/DL (ref 65–100)
HCT VFR BLD AUTO: 37.5 % (ref 36.6–50.3)
HGB BLD-MCNC: 12.8 G/DL (ref 12.1–17)
MAGNESIUM SERPL-MCNC: 2 MG/DL (ref 1.6–2.4)
MCH RBC QN AUTO: 34.7 PG (ref 26–34)
MCHC RBC AUTO-ENTMCNC: 34.1 G/DL (ref 30–36.5)
MCV RBC AUTO: 101.6 FL (ref 80–99)
NRBC # BLD: 0 K/UL (ref 0–0.01)
NRBC BLD-RTO: 0 PER 100 WBC
PLATELET # BLD AUTO: 122 K/UL (ref 150–400)
PMV BLD AUTO: 12.4 FL (ref 8.9–12.9)
POTASSIUM SERPL-SCNC: 4.3 MMOL/L (ref 3.5–5.1)
RBC # BLD AUTO: 3.69 M/UL (ref 4.1–5.7)
SODIUM SERPL-SCNC: 140 MMOL/L (ref 136–145)
WBC # BLD AUTO: 7.3 K/UL (ref 4.1–11.1)

## 2025-01-16 PROCEDURE — 6370000000 HC RX 637 (ALT 250 FOR IP): Performed by: INTERNAL MEDICINE

## 2025-01-16 PROCEDURE — 6370000000 HC RX 637 (ALT 250 FOR IP): Performed by: NURSE PRACTITIONER

## 2025-01-16 PROCEDURE — 2500000003 HC RX 250 WO HCPCS: Performed by: NURSE PRACTITIONER

## 2025-01-16 PROCEDURE — 36415 COLL VENOUS BLD VENIPUNCTURE: CPT

## 2025-01-16 PROCEDURE — 80048 BASIC METABOLIC PNL TOTAL CA: CPT

## 2025-01-16 PROCEDURE — 85027 COMPLETE CBC AUTOMATED: CPT

## 2025-01-16 PROCEDURE — 93005 ELECTROCARDIOGRAM TRACING: CPT | Performed by: NURSE PRACTITIONER

## 2025-01-16 PROCEDURE — 83735 ASSAY OF MAGNESIUM: CPT

## 2025-01-16 PROCEDURE — 71045 X-RAY EXAM CHEST 1 VIEW: CPT

## 2025-01-16 RX ORDER — AMLODIPINE BESYLATE 5 MG/1
5 TABLET ORAL DAILY
Qty: 30 TABLET | Refills: 3 | Status: SHIPPED | OUTPATIENT
Start: 2025-01-17

## 2025-01-16 RX ORDER — AMLODIPINE BESYLATE 5 MG/1
5 TABLET ORAL DAILY
Status: DISCONTINUED | OUTPATIENT
Start: 2025-01-16 | End: 2025-01-16 | Stop reason: HOSPADM

## 2025-01-16 RX ADMIN — Medication 100 MCG: at 09:39

## 2025-01-16 RX ADMIN — ALLOPURINOL 200 MG: 100 TABLET ORAL at 09:40

## 2025-01-16 RX ADMIN — Medication 500 UNITS: at 09:39

## 2025-01-16 RX ADMIN — SODIUM CHLORIDE, PRESERVATIVE FREE 10 ML: 5 INJECTION INTRAVENOUS at 09:41

## 2025-01-16 RX ADMIN — ASPIRIN 81 MG: 81 TABLET, COATED ORAL at 09:39

## 2025-01-16 RX ADMIN — FENOFIBRATE 160 MG: 160 TABLET ORAL at 09:39

## 2025-01-16 RX ADMIN — LISINOPRIL 2.5 MG: 5 TABLET ORAL at 09:39

## 2025-01-16 RX ADMIN — SODIUM CHLORIDE, PRESERVATIVE FREE 10 ML: 5 INJECTION INTRAVENOUS at 00:37

## 2025-01-16 RX ADMIN — AMLODIPINE BESYLATE 5 MG: 5 TABLET ORAL at 09:44

## 2025-01-16 NOTE — PROGRESS NOTES
[facility] FLOOR Progress Note    Admit Date: 1/15/2025  POD: 1 Day Post-Op      Procedure:  Procedure(s):  Transcatheter aortic valve replacement    Subjective/overnight events:   Pt seen with Dr. Hernandez, , in bed.. In NSR, On room air, Afebrile.   Vital signs stable. No events overnight. Access sites stable. No complaints at present.  Objective:     BP (!) 152/61   Pulse 66   Temp 98.3 °F (36.8 °C) (Oral)   Resp 14   Ht 1.676 m (5' 6\")   Wt 72.1 kg (159 lb)   SpO2 98%   BMI 25.66 kg/m²   Temp (24hrs), Av °F (36.7 °C), Min:97 °F (36.1 °C), Max:98.5 °F (36.9 °C)      Last 24hr Input/Output:    Intake/Output Summary (Last 24 hours) at 2025 0839  Last data filed at 2025 0320  Gross per 24 hour   Intake 2460 ml   Output 1650 ml   Net 810 ml        EKG/Rhythm:   Encounter Date: 01/15/25   EKG 12 lead   Result Value    Ventricular Rate 62    Atrial Rate 62    P-R Interval 146    QRS Duration 88    Q-T Interval 404    QTc Calculation (Bazett) 410    P Axis 43    R Axis -4    T Axis 22    Diagnosis      Normal sinus rhythm  Possible Anterior infarct , age undetermined  Abnormal ECG  When compared with ECG of 15-ELLEN-2025 09:54,  MANUAL COMPARISON REQUIRED, DATA IS UNCONFIRMED         Oxygen: As above    CXR: Xray Result (most recent):  XR CHEST PORTABLE 2025    Narrative  INDICATION: s/p structural heart intervention    COMPARISON: May 18, 2018    FINDINGS: Single AP portable view of the chest demonstrates normal heart size.  Aortic valve replacement now present. The cardiomediastinal silhouette is  unchanged. Lungs are adequately expanded and clear. Skinfold overlies the left  upper lobe.    Impression  Status post aortic valve replacement. Lungs are clear.    Electronically signed by Med Carter      Echo: 01/15/25    ECHO (TTE) COMPLETE (PRN CONTRAST/BUBBLE/STRAIN/3D) 01/15/2025  3:42 PM (Final)    Interpretation Summary    Left Ventricle: Normal left ventricular systolic function with a visually

## 2025-01-16 NOTE — PLAN OF CARE
Problem: Chronic Conditions and Co-morbidities  Goal: Patient's chronic conditions and co-morbidity symptoms are monitored and maintained or improved  Outcome: Adequate for Discharge  Flowsheets (Taken 1/16/2025 0753)  Care Plan - Patient's Chronic Conditions and Co-Morbidity Symptoms are Monitored and Maintained or Improved: Collaborate with multidisciplinary team to address chronic and comorbid conditions and prevent exacerbation or deterioration     Problem: Discharge Planning  Goal: Discharge to home or other facility with appropriate resources  Outcome: Adequate for Discharge  Flowsheets (Taken 1/16/2025 0753)  Discharge to home or other facility with appropriate resources: Identify discharge learning needs (meds, wound care, etc)     Problem: ABCDS Injury Assessment  Goal: Absence of physical injury  Outcome: Adequate for Discharge     Problem: Safety - Adult  Goal: Free from fall injury  Outcome: Adequate for Discharge  Flowsheets  Taken 1/16/2025 0320 by Suni Ruvalcaba RN  Free From Fall Injury: Instruct family/caregiver on patient safety  Taken 1/16/2025 0000 by Suni Ruvalcaba RN  Free From Fall Injury: Instruct family/caregiver on patient safety

## 2025-01-16 NOTE — DISCHARGE INSTRUCTIONS
Cardiac Surgery Specialist - Valve Clinic    [address]      [place] [address]  Office- [phone]  Fax- [phone]  _____________________________________________________________  Dr. Anand Tabor, [facility]   Dr. Thanh Ortiz, [facility]  Dr. Janie Hernandez, [facility]     Jennie Mahan, MSN, ACNPC-AG  Amanda Elliott, MSN, AGPCNP-BC  ___________________________________________________________     Surgery & Date: Procedure(s):  Transcatheter aortic valve replacement    Patient ID:  Maycol Naqvi .  [id number]  75 y.o.  1950    Admit Date: 1/15/2025    Discharge Date: 1/16/2025     Admitting Physician: [unfilled]     Discharge Physician: [unfilled]    Admission Diagnoses:   Aortic stenosis [I35.0]  Aortic stenosis, severe [I35.0]    Discharge Diagnoses:   [unfilled]    Discharge Condition: {condition:15023759}    Cardiology Procedures this Admission:  {CARDIOLOGY PROCEDURES:45699824}    Disposition: {disposition:19131}    Reference discharge instructions provided by nursing for diet and activity.    Signed:  RYAN James CNP  1/16/2025  9:02 AM        MEDICATIONS:  Please refer to your After Visit Summary for your medication list.           Guidelines to Follow After Your TAVR  The purpose of these instructions is to provide you with information on how to care for yourself after your Transcatheter Aortic Valve Replacement (TAVR). If you have any questions after your discharge, please call us at [phone].  Prior to discharge, you will receive a temporary implant card. A permanent card will be sent to you in approximately 6 to 8 weeks. Share this card with your doctors, including your dentist. It is important to share information regarding your heart valve replacement before any medical, dental, or  MRI procedures.     Seek care immediately (dial “911”) if you experience:    Sudden decrease in strength and sensation (numbness or tingling) of face, arm, or leg, especially one side of your body  Sudden confusion or trouble speaking or understanding speech  Sudden trouble seeing in one or both eyes   A change in skin color or temperature (numb, tingly, coolness to touch, blue color) of the arms or legs  Sudden swelling, bleeding, and/or sudden onset of pain in one of your groins  Sudden onset of chest, back, or abdominal pain  Sudden severe shortness of breath    Notify your cardiologist if you develop:    Fever and chills with a temperature above 101.0°F  Bleeding, redness, swelling, increased pain, or foul smelling discharge at incision site  Shortness of breath  Dizzy or fainting spells  Increased swelling in feet and ankles or weight gain of more than 5 pounds in 2 days  Prolonged increased fatigue, severe weakness, nausea, or vomiting  Irregular or rapid heart rate  Questions regarding cardiac medication    Activity    You should walk at least 3 to 4 times per day. Pace your activities, increasing them gradually. Daily exercise and adequate rest are important.   You can climb stairs slowly, as tolerated. Sexual activity can be resumed 2 to 3 weeks after being discharged, or when you can easily climb a flight of stairs.    Avoid lifting, pushing, or pulling objects heavier than 10 pounds for 10 days after your procedure. As a reference, a gallon of milk weights about 9 pounds.   In most cases, driving can be resumed 1 week after being discharged. However, each patient is different, and your cardiologist will advise you when you can drive at the time of your discharge. Do not drive if you are taking narcotic pain medication. You can ride as a passenger in a car at any time, but wear your seatbelt.    Generally, you may return to work about 1 week after being discharged, but you surgeon/cardiologist will verify

## 2025-01-16 NOTE — CARE COORDINATION
Chart reviewed. CM acknowledged d/c order. Pt admitted for TAVR procedure. Pt spouse to transport pt home. No CM needs identified at this time.     GAYLE Mistry  Care Management  [facility]   [id number]

## 2025-01-16 NOTE — PROGRESS NOTES
Pt ready for discharge. RN reviewed all new medications and where to  the prescriptions. RN reviewed how to follow up with the cardiologist. RN reviewed right radial , left radial, and right groin site care instructions. Pt verbalized understanding. RN provided time to answer any and all questions.

## 2025-01-17 LAB
EKG ATRIAL RATE: 62 BPM
EKG DIAGNOSIS: NORMAL
EKG P AXIS: 43 DEGREES
EKG P-R INTERVAL: 146 MS
EKG Q-T INTERVAL: 404 MS
EKG QRS DURATION: 88 MS
EKG QTC CALCULATION (BAZETT): 410 MS
EKG R AXIS: -4 DEGREES
EKG T AXIS: 22 DEGREES
EKG VENTRICULAR RATE: 62 BPM

## 2025-01-19 LAB
EKG ATRIAL RATE: 59 BPM
EKG DIAGNOSIS: NORMAL
EKG P AXIS: 64 DEGREES
EKG P-R INTERVAL: 174 MS
EKG Q-T INTERVAL: 456 MS
EKG QRS DURATION: 88 MS
EKG QTC CALCULATION (BAZETT): 451 MS
EKG R AXIS: 28 DEGREES
EKG T AXIS: -15 DEGREES
EKG VENTRICULAR RATE: 59 BPM

## 2025-02-24 ENCOUNTER — OFFICE VISIT (OUTPATIENT)
Age: 75
End: 2025-02-24
Payer: MEDICARE

## 2025-02-24 VITALS
TEMPERATURE: 98.1 F | DIASTOLIC BLOOD PRESSURE: 79 MMHG | OXYGEN SATURATION: 97 % | BODY MASS INDEX: 26.5 KG/M2 | SYSTOLIC BLOOD PRESSURE: 150 MMHG | HEART RATE: 74 BPM | WEIGHT: 164.2 LBS

## 2025-02-24 DIAGNOSIS — Z95.2 S/P TAVR (TRANSCATHETER AORTIC VALVE REPLACEMENT): Primary | ICD-10-CM

## 2025-02-24 PROCEDURE — 1159F MED LIST DOCD IN RCRD: CPT

## 2025-02-24 PROCEDURE — 3077F SYST BP >= 140 MM HG: CPT

## 2025-02-24 PROCEDURE — 1123F ACP DISCUSS/DSCN MKR DOCD: CPT

## 2025-02-24 PROCEDURE — 1036F TOBACCO NON-USER: CPT | Performed by: THORACIC SURGERY (CARDIOTHORACIC VASCULAR SURGERY)

## 2025-02-24 PROCEDURE — 3078F DIAST BP <80 MM HG: CPT

## 2025-02-24 PROCEDURE — 99213 OFFICE O/P EST LOW 20 MIN: CPT

## 2025-02-24 PROCEDURE — G0403 EKG FOR INITIAL PREVENT EXAM: HCPCS

## 2025-02-24 PROCEDURE — G8427 DOCREV CUR MEDS BY ELIG CLIN: HCPCS

## 2025-02-24 PROCEDURE — G8419 CALC BMI OUT NRM PARAM NOF/U: HCPCS

## 2025-02-24 NOTE — PROGRESS NOTES
Name: Maycol Naqvi Jr.   : 1950   Home Phone: [phone]     Reviewed record in preparation for visit and have obtained necessary documentation. Identified pt with two pt identifiers (name and ).     1. Have you been to the ER, urgent care clinic since your last visit?  Hospitalized since your last visit?No    2. Have you seen or consulted any other health care providers outside of the [facility] since your last visit?  Include any pap smears or colon screening. No      Maycol Naqvi Jr. is being seen for TAVR follow up approximately One month post-operatively”.     Patient counseled on Office contact information and instructed to follow up  with primary cardiologist. Patient given opportunity to ask questions and receive clarification.     Current Medications-Reviewed with Patient    Allergies-Reviewed with Patient        Vitals  BP (!) 150/79   Pulse 74   Temp 98.1 °F (36.7 °C)   Wt 74.5 kg (164 lb 3.2 oz)   SpO2 97%   BMI 26.50 kg/m²

## 2025-02-24 NOTE — PROGRESS NOTES
Patient: Maycol Naqvi Jr.   Age: 75 y.o.     Patient Care Team:  Paul Bullock MD as PCP - General (Family Medicine)  [facility] Bullock [facility]  Tate Tabor MD as Physician  David, Janie Worthy MD as Consulting Physician (Cardiology)  Devon Ramos MD (Cardiothoracic Surgery)    PCP: Paul Bullock MD    Cardiologist: [facility]    Diagnosis/Reason for Consultation: The encounter diagnosis was S/P TAVR (transcatheter aortic valve replacement).     HPI: 75 y.o. y.o. male  S/P TAVR with Dr. Ramos and with Dr. Hernandez under MAC anesthesia on 1/15/2025. There were no intra-op or post-op complications . Maycol Naqvi Jr. was discharged to home on POD1 and is here today for his 1 month follow up. Pre-op sx included CP, SOB, and fatigue (chest pain more so GERD like pain).     2/24/25:Overall doing well. Still has the same intermittent, mild chest discomfort to his chest (dull, sometimes attributed to gas- improves with belching)- but hasn't had any in the past 2-3 weeks. He is active, walked on the treadmill this AM. Does wood working. Denies syncope, dizziness, palpitations, SOB, orthopnea, PND, LE edema, weight gain >3 lbs in one day or >5 lbs in one week, fever or chills, nausea and/or vomiting, constipation, diarrhea, and redness, swelling or drainage from incision(s). Didn't feel like he needed cardiac rehab. No questions or concerns.      Last Dental Visit:  8/2024   Any dental pain/concerns: No    NYHA Classification: I   Class I (Mild): No limitation of physical activity. Ordinary physical activity does not cause undue fatigue, palpitation, or dyspnea.   Class II (Mild): Slight limitation of physical activity. Comfortable at rest, but ordinary physical activity results in fatigue, palpitation, or dyspnea.   Class III (Moderate): Marked limitation of physical activity. Comfortable at rest, but less than ordinary activity causes fatigue, palpitation, or dyspnea   Class IV

## 2025-05-30 ENCOUNTER — OFFICE VISIT (OUTPATIENT)
Age: 75
End: 2025-05-30
Payer: MEDICARE

## 2025-05-30 VITALS
RESPIRATION RATE: 17 BRPM | HEIGHT: 66 IN | HEART RATE: 67 BPM | TEMPERATURE: 98.1 F | DIASTOLIC BLOOD PRESSURE: 75 MMHG | SYSTOLIC BLOOD PRESSURE: 135 MMHG | OXYGEN SATURATION: 97 % | BODY MASS INDEX: 26.29 KG/M2 | WEIGHT: 163.6 LBS

## 2025-05-30 DIAGNOSIS — E53.8 B12 DEFICIENCY: ICD-10-CM

## 2025-05-30 DIAGNOSIS — E61.1 IRON DEFICIENCY: ICD-10-CM

## 2025-05-30 DIAGNOSIS — N18.32 CHRONIC KIDNEY DISEASE, STAGE 3B (HCC): ICD-10-CM

## 2025-05-30 DIAGNOSIS — E11.21 TYPE 2 DIABETES MELLITUS WITH DIABETIC NEPHROPATHY, WITHOUT LONG-TERM CURRENT USE OF INSULIN (HCC): Primary | ICD-10-CM

## 2025-05-30 DIAGNOSIS — E55.9 VITAMIN D DEFICIENCY: ICD-10-CM

## 2025-05-30 DIAGNOSIS — I10 PRIMARY HYPERTENSION: ICD-10-CM

## 2025-05-30 LAB — HBA1C MFR BLD: 5.9 %

## 2025-05-30 PROCEDURE — 1123F ACP DISCUSS/DSCN MKR DOCD: CPT | Performed by: FAMILY MEDICINE

## 2025-05-30 PROCEDURE — 99214 OFFICE O/P EST MOD 30 MIN: CPT | Performed by: FAMILY MEDICINE

## 2025-05-30 PROCEDURE — PBSHW AMB POC HEMOGLOBIN A1C: Performed by: FAMILY MEDICINE

## 2025-05-30 PROCEDURE — 83036 HEMOGLOBIN GLYCOSYLATED A1C: CPT | Performed by: FAMILY MEDICINE

## 2025-05-30 PROCEDURE — 3044F HG A1C LEVEL LT 7.0%: CPT | Performed by: FAMILY MEDICINE

## 2025-05-30 PROCEDURE — G8427 DOCREV CUR MEDS BY ELIG CLIN: HCPCS | Performed by: FAMILY MEDICINE

## 2025-05-30 PROCEDURE — 3075F SYST BP GE 130 - 139MM HG: CPT | Performed by: FAMILY MEDICINE

## 2025-05-30 PROCEDURE — 1126F AMNT PAIN NOTED NONE PRSNT: CPT | Performed by: FAMILY MEDICINE

## 2025-05-30 PROCEDURE — G8419 CALC BMI OUT NRM PARAM NOF/U: HCPCS | Performed by: FAMILY MEDICINE

## 2025-05-30 PROCEDURE — 1159F MED LIST DOCD IN RCRD: CPT | Performed by: FAMILY MEDICINE

## 2025-05-30 PROCEDURE — 1036F TOBACCO NON-USER: CPT | Performed by: FAMILY MEDICINE

## 2025-05-30 PROCEDURE — 3078F DIAST BP <80 MM HG: CPT | Performed by: FAMILY MEDICINE

## 2025-05-30 RX ORDER — LISINOPRIL 5 MG/1
5 TABLET ORAL DAILY
Qty: 90 TABLET | Refills: 1 | Status: SHIPPED | OUTPATIENT
Start: 2025-05-30

## 2025-05-30 ASSESSMENT — PATIENT HEALTH QUESTIONNAIRE - PHQ9
2. FEELING DOWN, DEPRESSED OR HOPELESS: NOT AT ALL
SUM OF ALL RESPONSES TO PHQ QUESTIONS 1-9: 0
SUM OF ALL RESPONSES TO PHQ QUESTIONS 1-9: 0
1. LITTLE INTEREST OR PLEASURE IN DOING THINGS: NOT AT ALL
SUM OF ALL RESPONSES TO PHQ QUESTIONS 1-9: 0
SUM OF ALL RESPONSES TO PHQ QUESTIONS 1-9: 0

## 2025-05-30 NOTE — PROGRESS NOTES
HPI  Maycol Naqvi Jr. is a 75 y.o. male who presents to follow-up on chronic medical issues.    ..     Diabetes is diet controlled     Remote history of prostate cancer 12 years ago.  Prostatectomy.  Sees urologist once a year.     [facility]     Checks his blood pressure at home.  Monitor is through his nephrologist    PMHx:  Past Medical History:   Diagnosis Date    Aortic valve stenosis     At risk for sleep apnea 01/08/2025    stop bang score 4    Atherosclerotic vascular disease 05/2018    CXR Finding - Aorta    CKD stage G3b/A1, GFR 30-44 and albumin creatinine ratio <30 mg/g (HCC)     nephrologist is Dr. Stevie Bustos    Dyslipidemia     History of kidney stones 01/2012    CT Finding    History of pericarditis 05/23/2018    idiopathic    Hypertension     Idiopathic chronic gout     Melanoma (HCC)     forehead    Prostate cancer (HCC)     Skin cancer     back of neck    Type II diabetes mellitus with nephropathy (HCC) 09/18/2019    diet controlled       Meds:   Current Outpatient Medications   Medication Sig Dispense Refill    lisinopril (PRINIVIL;ZESTRIL) 5 MG tablet Take 1 tablet by mouth daily 90 tablet 1    vitamin E 400 UNIT capsule Take 1 capsule by mouth daily      allopurinol (ZYLOPRIM) 100 MG tablet Take 2 tablets by mouth daily 180 tablet 3    fenofibrate (TRIGLIDE) 160 MG tablet Take 1 tablet by mouth daily 90 tablet 3    aspirin 81 MG EC tablet Take 1 tablet by mouth daily      ascorbic acid (VITAMIN C) 500 MG tablet Take 1 tablet by mouth Daily with lunch      vitamin D (CHOLECALCIFEROL) 25 MCG (1000 UT) TABS tablet Take 0.5 tablets by mouth 2 times daily      cyanocobalamin 100 MCG tablet Take 1 tablet by mouth daily      Potassium Citrate ER (UROCIT-K) 15 MEQ (1620 MG) TBCR extended release tablet Take 1 tablet by mouth Daily with lunch       No current facility-administered medications for this visit.       Allergies:   No Known Allergies    Smoker:  Social History

## 2025-05-30 NOTE — PROGRESS NOTES
Health Maintenance Due   Topic Date Due    DTaP/Tdap/Td vaccine (1 - Tdap) Never done    Prostate Specific Antigen (PSA) Screening or Monitoring  Never done    Shingles vaccine (1 of 2) Never done    Respiratory Syncytial Virus (RSV) Pregnant or age 60 yrs+ (1 - 1-dose 75+ series) Never done    Diabetic Alb to Cr ratio (uACR) test  07/29/2023    COVID-19 Vaccine (3 - 2024-25 season) 09/01/2024

## 2025-05-30 NOTE — PATIENT INSTRUCTIONS
High blood pressure  Try stopping amlodipine for a week to make sure you feel better  Then increase your lisinopril to 5 mg daily.    Goal blood pressure less than 140/90    If your blood pressure is still high for a week you may increase your lisinopril to 10 mg.  Do not go higher than lisinopril 10 mg until you have followed up with your nephrologist

## 2025-05-31 LAB
COMMENT:: NORMAL
SPECIMEN HOLD: NORMAL

## 2025-06-01 LAB
25(OH)D3 SERPL-MCNC: 39.9 NG/ML (ref 30–100)
ALBUMIN SERPL-MCNC: 4.1 G/DL (ref 3.5–5)
ALBUMIN/GLOB SERPL: 1.2 (ref 1.1–2.2)
ALP SERPL-CCNC: 42 U/L (ref 45–117)
ALT SERPL-CCNC: 28 U/L (ref 12–78)
ANION GAP SERPL CALC-SCNC: 6 MMOL/L (ref 2–12)
AST SERPL-CCNC: 25 U/L (ref 15–37)
BASOPHILS # BLD: 0.05 K/UL (ref 0–0.1)
BASOPHILS NFR BLD: 0.9 % (ref 0–1)
BILIRUB SERPL-MCNC: 0.7 MG/DL (ref 0.2–1)
BUN SERPL-MCNC: 55 MG/DL (ref 6–20)
BUN/CREAT SERPL: 24 (ref 12–20)
CALCIUM SERPL-MCNC: 9.5 MG/DL (ref 8.5–10.1)
CHLORIDE SERPL-SCNC: 108 MMOL/L (ref 97–108)
CHOLEST SERPL-MCNC: 153 MG/DL
CO2 SERPL-SCNC: 23 MMOL/L (ref 21–32)
CREAT SERPL-MCNC: 2.28 MG/DL (ref 0.7–1.3)
CREAT UR-MCNC: 91.7 MG/DL
DIFFERENTIAL METHOD BLD: ABNORMAL
EOSINOPHIL # BLD: 0.39 K/UL (ref 0–0.4)
EOSINOPHIL NFR BLD: 6.7 % (ref 0–7)
ERYTHROCYTE [DISTWIDTH] IN BLOOD BY AUTOMATED COUNT: 14 % (ref 11.5–14.5)
FERRITIN SERPL-MCNC: 293 NG/ML (ref 26–388)
FOLATE SERPL-MCNC: 16.7 NG/ML (ref 5–21)
GLOBULIN SER CALC-MCNC: 3.5 G/DL (ref 2–4)
GLUCOSE SERPL-MCNC: 107 MG/DL (ref 65–100)
HCT VFR BLD AUTO: 41.9 % (ref 36.6–50.3)
HDLC SERPL-MCNC: 40 MG/DL
HDLC SERPL: 3.8 (ref 0–5)
HGB BLD-MCNC: 13.3 G/DL (ref 12.1–17)
IMM GRANULOCYTES # BLD AUTO: 0.01 K/UL (ref 0–0.04)
IMM GRANULOCYTES NFR BLD AUTO: 0.2 % (ref 0–0.5)
IRON SATN MFR SERPL: 21 % (ref 20–50)
IRON SERPL-MCNC: 87 UG/DL (ref 35–150)
LDLC SERPL CALC-MCNC: 91.6 MG/DL (ref 0–100)
LYMPHOCYTES # BLD: 1.65 K/UL (ref 0.8–3.5)
LYMPHOCYTES NFR BLD: 28.2 % (ref 12–49)
MCH RBC QN AUTO: 33.2 PG (ref 26–34)
MCHC RBC AUTO-ENTMCNC: 31.7 G/DL (ref 30–36.5)
MCV RBC AUTO: 104.5 FL (ref 80–99)
MICROALBUMIN UR-MCNC: 0.55 MG/DL
MICROALBUMIN/CREAT UR-RTO: 6 MG/G (ref 0–30)
MONOCYTES # BLD: 0.56 K/UL (ref 0–1)
MONOCYTES NFR BLD: 9.6 % (ref 5–13)
NEUTS SEG # BLD: 3.19 K/UL (ref 1.8–8)
NEUTS SEG NFR BLD: 54.4 % (ref 32–75)
NRBC # BLD: 0 K/UL (ref 0–0.01)
NRBC BLD-RTO: 0 PER 100 WBC
PLATELET # BLD AUTO: 162 K/UL (ref 150–400)
PMV BLD AUTO: 12.2 FL (ref 8.9–12.9)
POTASSIUM SERPL-SCNC: 4.9 MMOL/L (ref 3.5–5.1)
PROT SERPL-MCNC: 7.6 G/DL (ref 6.4–8.2)
RBC # BLD AUTO: 4.01 M/UL (ref 4.1–5.7)
SODIUM SERPL-SCNC: 137 MMOL/L (ref 136–145)
TIBC SERPL-MCNC: 407 UG/DL (ref 250–450)
TRIGL SERPL-MCNC: 107 MG/DL
VIT B12 SERPL-MCNC: 573 PG/ML (ref 193–986)
VLDLC SERPL CALC-MCNC: 21.4 MG/DL
WBC # BLD AUTO: 5.9 K/UL (ref 4.1–11.1)

## 2025-06-05 ENCOUNTER — RESULTS FOLLOW-UP (OUTPATIENT)
Age: 75
End: 2025-06-05

## (undated) DEVICE — BENTSON GUIDEWIRE ANGIO L150CM OD0.035IN STR FIX PTFE SLD SUPP

## (undated) DEVICE — TOWEL,OR,DSP,ST,BLUE,STD,2/PK,40PK/CS: Brand: MEDLINE

## (undated) DEVICE — DRAPE,ANGIO,BRACH,STERILE,38X44: Brand: MEDLINE

## (undated) DEVICE — ELECTRODE PT RET AD L9FT HI MOIST COND ADH HYDRGEL CORDED

## (undated) DEVICE — KIT ACCS INTRO 4FR L10CM NDL 21GA L7CM GWIRE L40CM

## (undated) DEVICE — PACK,UNIVERSAL,NO GOWNS: Brand: MEDLINE

## (undated) DEVICE — GLIDESHEATH SLENDER ACCESS KIT: Brand: GLIDESHEATH SLENDER

## (undated) DEVICE — BOWL MED L 32OZ PLAS W/ MOLD GRAD EZ OPN PEEL PCH

## (undated) DEVICE — 3M™ IOBAN™ 2 ANTIMICROBIAL INCISE DRAPE 6650EZ: Brand: IOBAN™ 2

## (undated) DEVICE — PROVE COVER: Brand: UNBRANDED

## (undated) DEVICE — HEART CATH-MRMC: Brand: MEDLINE INDUSTRIES, INC.

## (undated) DEVICE — ROSEN CURVED WIRE GUIDE: Brand: ROSEN

## (undated) DEVICE — CATHETER COR DIAG PIGTAILS PIG 145 CRV 5FR 110CM 6 SIDE H

## (undated) DEVICE — DRESSING HEMOSTATIC SFT INTVENT W/O SLT DBL WRP QUIKCLOT LF

## (undated) DEVICE — SPLINT WR VELC FOAM NEUT POS DISP FOR RAD ART ACC SFT STRP

## (undated) DEVICE — SYRINGE ANGIO 10 CC BRL STD PRNT POLYCARB LT BLU MEDALLION

## (undated) DEVICE — GOWN,SIRUS,FABRNF,XL,20/CS: Brand: MEDLINE

## (undated) DEVICE — GUIDEWIRE VASCULAR L145CM 0.035IN J TIP L3MM PTFE FIX COR NAMIC

## (undated) DEVICE — SYRINGE KIT,PACKAGED,,150FT,MK 7(ANGIO-ARTERION, 150ML SYR KIT W/QFT,MC)(60729385): Brand: MEDRAD® MARK 7 ARTERION DISPOSABLE SYRINGE 150 ML WITH QUICK FILL TUBE

## (undated) DEVICE — SC 3W HP RA OFF NB - PG: Brand: NAMIC

## (undated) DEVICE — CATHETER DIAG 5FR L100CM LUMN ID0.047IN JR4 CRV 0 SIDE H

## (undated) DEVICE — PERCLOSE™ PROSTYLE™ SUTURE-MEDIATED CLOSURE AND REPAIR SYSTEM: Brand: PERCLOSE™ PROSTYLE™

## (undated) DEVICE — CATHETER PACE 5FR L110CM 6FR INTRO D10CM 1MM SPACE POLYUR

## (undated) DEVICE — PINNACLE INTRODUCER SHEATH: Brand: PINNACLE

## (undated) DEVICE — 3M™ TEGADERM™ TRANSPARENT FILM DRESSING FRAME STYLE, 1626W, 4 IN X 4-3/4 IN (10 CM X 12 CM), 50/CT 4CT/CASE: Brand: 3M™ TEGADERM™

## (undated) DEVICE — TRUE™ FLOW VALVULOPLASTY PERFUSION CATHETER, 18 MM X 3.5 CM, 110 CM CATHETER: Brand: TRUE FLOW

## (undated) DEVICE — TR BAND RADIAL ARTERY COMPRESSION DEVICE: Brand: TR BAND

## (undated) DEVICE — TOWEL SURG W17XL27IN STD BLU COT NONFENESTRATED PREWASHED

## (undated) DEVICE — GUIDEWIRE VASC L260CM DIA0.035IN BRECKER FOR COREVLV

## (undated) DEVICE — TUBING PRSS MON L6IN PVC M FEM CONN

## (undated) DEVICE — GUIDEWIRE VASC L260CM 0.035IN J TIP L3MM PTFE FIX COR NAMIC